# Patient Record
Sex: FEMALE | Race: WHITE | HISPANIC OR LATINO | Employment: STUDENT | URBAN - METROPOLITAN AREA
[De-identification: names, ages, dates, MRNs, and addresses within clinical notes are randomized per-mention and may not be internally consistent; named-entity substitution may affect disease eponyms.]

---

## 2017-01-03 ENCOUNTER — OFFICE VISIT (OUTPATIENT)
Dept: OBSTETRICS AND GYNECOLOGY | Facility: CLINIC | Age: 27
End: 2017-01-03
Payer: MEDICAID

## 2017-01-03 VITALS
BODY MASS INDEX: 20.98 KG/M2 | SYSTOLIC BLOOD PRESSURE: 118 MMHG | HEIGHT: 62 IN | DIASTOLIC BLOOD PRESSURE: 80 MMHG | WEIGHT: 114 LBS

## 2017-01-03 DIAGNOSIS — Z01.419 ENCOUNTER FOR ANNUAL ROUTINE GYNECOLOGICAL EXAMINATION: Primary | ICD-10-CM

## 2017-01-03 DIAGNOSIS — Z30.44 ENCOUNTER FOR SURVEILLANCE OF VAGINAL RING HORMONAL CONTRACEPTIVE DEVICE: ICD-10-CM

## 2017-01-03 PROCEDURE — 99202 OFFICE O/P NEW SF 15 MIN: CPT | Mod: PBBFAC | Performed by: OBSTETRICS & GYNECOLOGY

## 2017-01-03 PROCEDURE — 99999 PR PBB SHADOW E&M-NEW PATIENT-LVL II: CPT | Mod: PBBFAC,,, | Performed by: OBSTETRICS & GYNECOLOGY

## 2017-01-03 PROCEDURE — 99385 PREV VISIT NEW AGE 18-39: CPT | Mod: S$PBB,,, | Performed by: OBSTETRICS & GYNECOLOGY

## 2017-01-03 RX ORDER — LISINOPRIL AND HYDROCHLOROTHIAZIDE 20; 25 MG/1; MG/1
1 TABLET ORAL DAILY
COMMUNITY
End: 2021-12-02

## 2017-01-03 RX ORDER — ETONOGESTREL AND ETHINYL ESTRADIOL VAGINAL RING .015; .12 MG/D; MG/D
1 RING VAGINAL
Qty: 1 EACH | Refills: 11 | Status: SHIPPED | OUTPATIENT
Start: 2017-01-03 | End: 2018-01-25 | Stop reason: SDUPTHER

## 2017-01-03 NOTE — MR AVS SNAPSHOT
"    [facility] - OB/ GYN  [address], [address]  [place] [address]  Phone: [phone]                  Cherrie Mayeritalia   1/3/2017 3:40 PM   Office Visit    Description:  Female : 1990   Provider:  Sabrina Engle MD   Department:  [facility] - OB/ GYN           Reason for Visit     Annual Exam     Urinary Frequency                To Do List           Goals (5 Years of Data)     None      [facility] On Call     [facility] On Call Nurse Care Line -  Assistance  Registered nurses in the [facility] On Call Center provide clinical advisement, health education, appointment booking, and other advisory services.  Call for this free service at [phone].             Medications           Message regarding Medications     Verify the changes and/or additions to your medication regime listed below are the same as discussed with your clinician today.  If any of these changes or additions are incorrect, please notify your healthcare provider.             Verify that the below list of medications is an accurate representation of the medications you are currently taking.  If none reported, the list may be blank. If incorrect, please contact your healthcare provider. Carry this list with you in case of emergency.           Current Medications     buprenorphine-naloxone 2-0.5 mg (SUBOXONE) 2-0.5 mg Subl Place 2 mg under the tongue 2 (two) times daily.    ETONOGESTREL/ETHINYL ESTRADIOL (NUVARING VAGL) use as directed    ALPRAZOLAM (XANAX XR ORAL) Take by mouth.    atomoxetine (STRATTERA) 60 MG capsule Take 60 mg by mouth once daily.    docusate sodium (COLACE) 100 MG capsule Take 1 capsule (100 mg total) by mouth 2 (two) times daily.    lisinopril-hydrochlorothiazide (PRINZIDE,ZESTORETIC) 20-25 mg Tab Take 1 tablet by mouth once daily.           Clinical Reference Information           Vital Signs - Last Recorded  Most recent update: 1/3/2017  4:08 PM by Andra Ivey MA    BP Ht Wt LMP BMI    118/80 5' 2" (1.575 m) " 51.7 kg (114 lb) 12/14/2016 (Approximate) 20.85 kg/m2      Blood Pressure          Most Recent Value    BP  118/80      Allergies as of 1/3/2017     No Known Allergies      Immunizations Administered on Date of Encounter - 1/3/2017     None      Smoking Cessation     If you would like to quit smoking:   You may be eligible for free services if you are a [place] resident and started smoking cigarettes before September 1, 1988.  Call the [employer]ess[employer] ([employer]) toll free at ([phone] or [phone].   Call 6-762-QUIT-NOW if you do not meet the above criteria.

## 2017-01-03 NOTE — PROGRESS NOTES
Subjective:       Patient ID: Cherrie Wells is a 26 y.o. female.    Chief Complaint:  Annual Exam and Urinary Frequency      History of Present Illness  HPI  Cherrie Wells is a 26 y.o. female here for annual exam.    She describes her periods as regular, normal flow, lasting 3 days.   denies break through bleeding.   denies vaginal itching or irritation.  denies vaginal discharge.  She is sexually active. She has 1 partner.  She uses NuvaRing vaginal inserts for contraception.   History of abnormal pap: No  Last Pap: approximate date ~1-1.5 years ago and was normal  Last MMG: not indicated   Last Colonoscopy:  not indicated   Pt had gardisil shot.   Pt also has recurrent UTI. Taking macrobid after intercourse and is helping significantly.     GYN & OB History  Patient's last menstrual period was 12/14/2016 (approximate).   Date of Last Pap: No result found    OB History   No data available       Review of Systems  Review of Systems   Constitutional: Negative for chills, fatigue and fever.   Respiratory: Negative for shortness of breath.    Cardiovascular: Negative for chest pain.   Gastrointestinal: Negative for abdominal pain, constipation, diarrhea, nausea and vomiting.   Genitourinary: Negative for dyspareunia, dysuria, frequency, menorrhagia, menstrual problem, pelvic pain, vaginal discharge, vaginal pain, postcoital bleeding and vaginal odor.   Breast: Negative for breast mass, breast pain, nipple discharge and skin changes          Objective:    Physical Exam:   Constitutional: She is oriented to person, place, and time. She appears well-developed and well-nourished. No distress.    HENT:   Head: Normocephalic and atraumatic.     Neck: Normal range of motion. No thyromegaly present.    Cardiovascular: Normal rate and normal heart sounds.  Exam reveals no gallop and no friction rub.    No murmur heard.   Pulmonary/Chest: Effort normal and breath sounds normal. No respiratory distress. Right breast  exhibits no inverted nipple, no mass, no nipple discharge, no skin change, no tenderness, presence, no bleeding and no swelling. Left breast exhibits no inverted nipple, no mass, no nipple discharge, no skin change, no tenderness, presence, no bleeding and no swelling. Breasts are symmetrical.        Abdominal: Soft. Normal appearance and bowel sounds are normal. She exhibits no distension. There is no hepatosplenomegaly. There is no tenderness. There is no rigidity, no rebound and no guarding.     Genitourinary: There is no rash, tenderness, lesion or injury on the right labia. There is no rash, tenderness, lesion or injury on the left labia. Uterus is not deviated, not enlarged, not fixed, not tender, not hosting fibroids and not experiencing uterine prolapse. Cervix is normal. No absent adnexa (present). Right adnexum displays no mass, no tenderness and no fullness. Left adnexum displays no mass, no tenderness and no fullness. No erythema, tenderness or bleeding in the vagina. No signs of injury around the vagina. No vaginal discharge found. Cervix exhibits no motion tenderness, no discharge and no friability.   Genitourinary Comments: Urethral meatus normal        Uterus Size: 8 cm      Lymphadenopathy:     She has no cervical adenopathy.     She has no axillary adenopathy.    Neurological: She is alert and oriented to person, place, and time.     Psychiatric: She has a normal mood and affect.          Assessment:        1. Encounter for annual routine gynecological examination    2. Encounter for surveillance of vaginal ring hormonal contraceptive device              Plan:      1. Encounter for annual routine gynecological examination  - Pap up to date. Discussed ASCCP guidelines for screening every 3 years unless abnormal screening.   - MMG not indicated   - Colonoscopy not indicated      2. Encounter for surveillance of vaginal ring hormonal contraceptive device  - Patient was counseled today on contraceptive  options--Pills, Depo-Provera, IUD, Nexplanon, & Nuva Ring. We discussed the advantages and disadvantages of each. We discussed the continued use of condoms to prevent STDs. The patient elected to use NuvaRing. The session lasted approximately 25 minutes, greater than 50% was counseling. All her questions were answered.   - etonogestrel-ethinyl estradiol (NUVARING) 0.12-0.015 mg/24 hr vaginal ring; Place 1 each vaginally every 28 days.  Dispense: 1 each; Refill: 11       Return in about 1 year (around 1/3/2018).

## 2017-12-06 ENCOUNTER — HOSPITAL ENCOUNTER (EMERGENCY)
Facility: OTHER | Age: 27
Discharge: HOME | End: 2017-12-06
Attending: EMERGENCY MEDICINE
Payer: MEDICAID

## 2017-12-06 VITALS
DIASTOLIC BLOOD PRESSURE: 86 MMHG | TEMPERATURE: 99 F | WEIGHT: 115 LBS | RESPIRATION RATE: 18 BRPM | SYSTOLIC BLOOD PRESSURE: 121 MMHG | BODY MASS INDEX: 21.16 KG/M2 | HEIGHT: 62 IN | OXYGEN SATURATION: 100 % | HEART RATE: 80 BPM

## 2017-12-06 DIAGNOSIS — R00.0 TACHYCARDIA: ICD-10-CM

## 2017-12-06 DIAGNOSIS — R55 NEAR SYNCOPE: Primary | ICD-10-CM

## 2017-12-06 LAB
ALBUMIN SERPL BCP-MCNC: 3.5 G/DL
ALP SERPL-CCNC: 57 U/L
ALT SERPL W/O P-5'-P-CCNC: 11 U/L
AMPHET+METHAMPHET UR QL: NORMAL
ANION GAP SERPL CALC-SCNC: 10 MMOL/L
AST SERPL-CCNC: 13 U/L
BACTERIA #/AREA URNS HPF: ABNORMAL /HPF
BARBITURATES UR QL SCN>200 NG/ML: NEGATIVE
BASOPHILS # BLD AUTO: 0.01 K/UL
BASOPHILS NFR BLD: 0.2 %
BENZODIAZ UR QL SCN>200 NG/ML: NEGATIVE
BILIRUB SERPL-MCNC: 0.5 MG/DL
BILIRUB UR QL STRIP: NEGATIVE
BUN SERPL-MCNC: 8 MG/DL
BZE UR QL SCN: NEGATIVE
CALCIUM SERPL-MCNC: 8.8 MG/DL
CANNABINOIDS UR QL SCN: NEGATIVE
CHLORIDE SERPL-SCNC: 104 MMOL/L
CLARITY UR: CLEAR
CO2 SERPL-SCNC: 23 MMOL/L
COLOR UR: YELLOW
CREAT SERPL-MCNC: 0.8 MG/DL
CREAT UR-MCNC: 286.9 MG/DL
DIFFERENTIAL METHOD: ABNORMAL
EOSINOPHIL # BLD AUTO: 0 K/UL
EOSINOPHIL NFR BLD: 0.8 %
ERYTHROCYTE [DISTWIDTH] IN BLOOD BY AUTOMATED COUNT: 12.9 %
EST. GFR  (AFRICAN AMERICAN): >60 ML/MIN/1.73 M^2
EST. GFR  (NON AFRICAN AMERICAN): >60 ML/MIN/1.73 M^2
GLUCOSE SERPL-MCNC: 158 MG/DL
GLUCOSE UR QL STRIP: NEGATIVE
HCT VFR BLD AUTO: 36.1 %
HGB BLD-MCNC: 12.2 G/DL
HGB UR QL STRIP: ABNORMAL
KETONES UR QL STRIP: NEGATIVE
LEUKOCYTE ESTERASE UR QL STRIP: NEGATIVE
LYMPHOCYTES # BLD AUTO: 1.8 K/UL
LYMPHOCYTES NFR BLD: 34.4 %
MAGNESIUM SERPL-MCNC: 1.9 MG/DL
MCH RBC QN AUTO: 29 PG
MCHC RBC AUTO-ENTMCNC: 33.8 G/DL
MCV RBC AUTO: 86 FL
METHADONE UR QL SCN>300 NG/ML: NEGATIVE
MICROSCOPIC COMMENT: ABNORMAL
MONOCYTES # BLD AUTO: 0.2 K/UL
MONOCYTES NFR BLD: 3.1 %
NEUTROPHILS # BLD AUTO: 3.1 K/UL
NEUTROPHILS NFR BLD: 61.3 %
NITRITE UR QL STRIP: NEGATIVE
OPIATES UR QL SCN: NEGATIVE
PCP UR QL SCN>25 NG/ML: NEGATIVE
PH UR STRIP: 6 [PH] (ref 5–8)
PLATELET # BLD AUTO: 305 K/UL
PMV BLD AUTO: 9.1 FL
POTASSIUM SERPL-SCNC: 3.4 MMOL/L
PROT SERPL-MCNC: 6.8 G/DL
PROT UR QL STRIP: NEGATIVE
RBC # BLD AUTO: 4.21 M/UL
RBC #/AREA URNS HPF: 5 /HPF (ref 0–4)
SODIUM SERPL-SCNC: 137 MMOL/L
SP GR UR STRIP: >=1.03 (ref 1–1.03)
SQUAMOUS #/AREA URNS HPF: 9 /HPF
TOXICOLOGY INFORMATION: NORMAL
TSH SERPL DL<=0.005 MIU/L-ACNC: 1.01 UIU/ML
URN SPEC COLLECT METH UR: ABNORMAL
UROBILINOGEN UR STRIP-ACNC: NEGATIVE EU/DL
WBC # BLD AUTO: 5.09 K/UL
WBC #/AREA URNS HPF: 3 /HPF (ref 0–5)

## 2017-12-06 PROCEDURE — 81000 URINALYSIS NONAUTO W/SCOPE: CPT

## 2017-12-06 PROCEDURE — 25000003 PHARM REV CODE 250: Performed by: EMERGENCY MEDICINE

## 2017-12-06 PROCEDURE — 93010 ELECTROCARDIOGRAM REPORT: CPT | Mod: ,,, | Performed by: INTERNAL MEDICINE

## 2017-12-06 PROCEDURE — 99284 EMERGENCY DEPT VISIT MOD MDM: CPT | Mod: 25

## 2017-12-06 PROCEDURE — 80053 COMPREHEN METABOLIC PANEL: CPT

## 2017-12-06 PROCEDURE — 96360 HYDRATION IV INFUSION INIT: CPT

## 2017-12-06 PROCEDURE — 85025 COMPLETE CBC W/AUTO DIFF WBC: CPT

## 2017-12-06 PROCEDURE — 93005 ELECTROCARDIOGRAM TRACING: CPT

## 2017-12-06 PROCEDURE — 83735 ASSAY OF MAGNESIUM: CPT

## 2017-12-06 PROCEDURE — 80307 DRUG TEST PRSMV CHEM ANLYZR: CPT

## 2017-12-06 PROCEDURE — 84443 ASSAY THYROID STIM HORMONE: CPT

## 2017-12-06 RX ORDER — ACETAMINOPHEN 500 MG
1000 TABLET ORAL
Status: COMPLETED | OUTPATIENT
Start: 2017-12-06 | End: 2017-12-06

## 2017-12-06 RX ADMIN — ACETAMINOPHEN 1000 MG: 500 TABLET ORAL at 05:12

## 2017-12-06 RX ADMIN — SODIUM CHLORIDE 1000 ML: 0.9 INJECTION, SOLUTION INTRAVENOUS at 04:12

## 2017-12-06 NOTE — ED PROVIDER NOTES
"Encounter Date: 12/6/2017    SCRIBE #1 NOTE: I, Jenny Andrade, am scribing for, and in the presence of, Dr. Zhang.       History     Chief Complaint   Patient presents with    Fall     near syncope w/ fall today x 1 hour ago. reports darkening of vision that has resolved but currently has "spots in vision" and trouble focusing while reading     Time seen by provider: 3:44 PM    This is a 27 y.o. female with history of HTN, anxiety, and substance abuse who presents to the ED s/p fall that occurred approximately 1 hour ago.  Soon after she awoke this afternoon, the patient states that she began experiencing diaphoresis, abdominal cramping, near-syncope, and dizziness.  She became concerned for her BP, and while walking to the kitchen to get her HTN medication, she states that her vision began to fade to black.  She claims that this episode lasted for approximately 15 seconds and caused her to fall to the ground with no head trauma.  As she stood up, the patient states that her vision began to clear, but it still remained "fuzzy" peripherally.  On arrival to the ED, she states that her vision has improved somewhat, but she does not yet feel at baseline.  She also endorses palpitations, but she denies appetite changes, fever, chills, nasal congestion, rhinorrhea, sore throat, cough, dysuria, and changes in urinary frequency or urgency.  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  She also admits that she took an unspecified dosage of Adderall, which she is not normally prescribed, at approximately 6:00 PM last night to aid in her studying.  However, she claims that she has taken Adderall under these circumstances before and has had no prior reactions like this.  The patient mentions no other identifying, alleviating, or exacerbating factors.  She admits that she is non-compliant with her HTN medication regimen " and takes it p.r.n. rather than q.d.        The history is provided by the patient.     Review of patient's allergies indicates:  No Known Allergies  Past Medical History:   Diagnosis Date    Hypertension     Substance abuse      History reviewed. No pertinent surgical history.  Family History   Problem Relation Age of Onset    Breast cancer Paternal Grandmother     Diabetes Father     Hypertension Father      Social History   Substance Use Topics    Smoking status: Current Every Day Smoker     Packs/day: 1.00     Types: Cigarettes    Smokeless tobacco: Not on file    Alcohol use Yes      Comment: social     Review of Systems   Constitutional: Positive for diaphoresis. Negative for chills and fever.   HENT: Negative for congestion, facial swelling, rhinorrhea and sore throat.    Eyes: Positive for visual disturbance.   Respiratory: Negative for shortness of breath.    Cardiovascular: Positive for palpitations. Negative for chest pain.   Gastrointestinal: Positive for abdominal pain (cramping). Negative for nausea and vomiting.   Endocrine: Negative for polyuria.   Genitourinary: Negative for difficulty urinating, dysuria, frequency and urgency.   Musculoskeletal: Negative for myalgias.   Skin: Negative for rash.   Neurological: Positive for dizziness. Negative for headaches.        Positive for near-syncope.         Physical Exam     Initial Vitals [12/06/17 1518]   BP Pulse Resp Temp SpO2   (!) 174/98 (!) 145 16 98.5 °F (36.9 °C) 100 %      MAP       123.33         Physical Exam    Nursing note and vitals reviewed.  Constitutional: She appears well-developed and well-nourished. She is not diaphoretic. No distress.   HENT:   Head: Normocephalic and atraumatic.   Right Ear: External ear normal.   Left Ear: External ear normal.   Mouth/Throat: Mucous membranes are dry.   Eyes: EOM are normal. Right eye exhibits no discharge. Left eye exhibits no discharge.   Neck: Normal range of motion.   Cardiovascular:  Regular rhythm and normal heart sounds. Tachycardia present.  Exam reveals no gallop and no friction rub.    No murmur heard.  Pulmonary/Chest: Breath sounds normal. No respiratory distress. She has no wheezes. She has no rhonchi. She has no rales.   Abdominal: Soft. There is no tenderness. There is no rebound and no guarding.   Musculoskeletal: Normal range of motion. She exhibits no edema or tenderness.   Neurological: She is alert and oriented to person, place, and time.   Skin: Skin is warm and dry. No rash and no abscess noted. No erythema. No pallor.   Psychiatric: She has a normal mood and affect. Her behavior is normal. Judgment and thought content normal.         ED Course   Procedures  Labs Reviewed   URINALYSIS - Abnormal; Notable for the following:        Result Value    Specific Gravity, UA >=1.030 (*)     Occult Blood UA 1+ (*)     All other components within normal limits   URINALYSIS MICROSCOPIC - Abnormal; Notable for the following:     RBC, UA 5 (*)     Bacteria, UA Moderate (*)     All other components within normal limits   CBC W/ AUTO DIFFERENTIAL - Abnormal; Notable for the following:     Hematocrit 36.1 (*)     MPV 9.1 (*)     Mono # 0.2 (*)     Mono% 3.1 (*)     All other components within normal limits   COMPREHENSIVE METABOLIC PANEL - Abnormal; Notable for the following:     Potassium 3.4 (*)     Glucose 158 (*)     All other components within normal limits   DRUG SCREEN PANEL, URINE EMERGENCY   TSH   MAGNESIUM   POCT URINE PREGNANCY     Imaging Results    None         EKG Readings: (Independently Interpreted)   Initial Reading: No STEMI.   Sinus tachycardia. Rate of 135. No acute ischemia or arrhythmia.            Medical Decision Making:   History:   Old Medical Records: I decided to obtain old medical records.  Clinical Tests:   Lab Tests: Ordered and Reviewed  Medical Tests: Ordered and Reviewed  ED Management:      27 y.o. female presents after near syncopal episode.  Episode occured 1  "hour PTA with 15 seconds of vision "going dark" and no head trauma or LOC. No current vision complaints or unilateral sx. She is significantly tachycardic on arrival, but admits to taking Adderall last night to help study for stressful exam today.  Also likely dehydrated and component of anxiety as well.  No sign of arrhythmia on EKG and no sign of infectious cause.  Labs with no acute findings, including normal TSH.  After fluids and reassurance, repeat heart rate in the 80's and patient asymptomatic.  Orthostatics now negative, and she feels comfortable with discharge.  No concerning cardiac or neuro-etiology fo near-syncope.  Likely vasovagal cause related to dehydration and stress/ Adderall.                  Scribe Attestation:   Scribe #1: I performed the above scribed service and the documentation accurately describes the services I performed. I attest to the accuracy of the note.    Attending Attestation:           Physician Attestation for Scribe:  Physician Attestation Statement for Scribe #1: I, Dr. Zhang, reviewed documentation, as scribed by Jenny Andrade in my presence, and it is both accurate and complete.                 ED Course      Clinical Impression:     1. Near syncope    2. Tachycardia                                 Kameron Zhang MD  12/06/17 1479    "

## 2017-12-06 NOTE — ED TRIAGE NOTES
"Pt reports to ED c/o syncopal episode with diaphoresis, "darkened" vision this morning that lasted a few minutes. Pt reports currently exam week and admits taking adderal last night approx 1800. PMH of HTN. Pt reports vision remains "fuzzy" throughout day. Friend at bedside was with pt after episode and denies aphasia, slurred speech, extremity weakness, chest pain, SOB.   "

## 2017-12-07 NOTE — ED NOTES
"Rounding completed on pt. Pt reporting "feeling much better", denies any heart palpitations or "racing heart". Bed in lowest, locked position, side rails up x 2.   "

## 2018-01-25 DIAGNOSIS — Z30.44 ENCOUNTER FOR SURVEILLANCE OF VAGINAL RING HORMONAL CONTRACEPTIVE DEVICE: ICD-10-CM

## 2018-01-26 RX ORDER — ETONOGESTREL AND ETHINYL ESTRADIOL .12; .015 MG/D; MG/D
INSERT, EXTENDED RELEASE VAGINAL
Qty: 1 EACH | Refills: 0 | Status: SHIPPED | OUTPATIENT
Start: 2018-01-26 | End: 2020-06-04

## 2018-04-04 DIAGNOSIS — Z30.44 ENCOUNTER FOR SURVEILLANCE OF VAGINAL RING HORMONAL CONTRACEPTIVE DEVICE: ICD-10-CM

## 2018-04-04 RX ORDER — ETONOGESTREL AND ETHINYL ESTRADIOL .12; .015 MG/D; MG/D
INSERT, EXTENDED RELEASE VAGINAL
Refills: 0 | OUTPATIENT
Start: 2018-04-04

## 2020-06-04 ENCOUNTER — OFFICE VISIT (OUTPATIENT)
Dept: OBSTETRICS AND GYNECOLOGY | Facility: CLINIC | Age: 30
End: 2020-06-04
Attending: STUDENT IN AN ORGANIZED HEALTH CARE EDUCATION/TRAINING PROGRAM
Payer: COMMERCIAL

## 2020-06-04 VITALS
SYSTOLIC BLOOD PRESSURE: 148 MMHG | HEIGHT: 62 IN | DIASTOLIC BLOOD PRESSURE: 106 MMHG | WEIGHT: 126.63 LBS | BODY MASS INDEX: 23.3 KG/M2

## 2020-06-04 DIAGNOSIS — Z12.4 SCREENING FOR CERVICAL CANCER: ICD-10-CM

## 2020-06-04 DIAGNOSIS — Z30.011 ENCOUNTER FOR BCP INITIAL PRESCRIPTION: ICD-10-CM

## 2020-06-04 DIAGNOSIS — I10 HYPERTENSION, UNSPECIFIED TYPE: ICD-10-CM

## 2020-06-04 DIAGNOSIS — Z01.419 WELL WOMAN EXAM: ICD-10-CM

## 2020-06-04 DIAGNOSIS — Z11.51 SCREENING FOR HPV (HUMAN PAPILLOMAVIRUS): Primary | ICD-10-CM

## 2020-06-04 DIAGNOSIS — F11.11 HISTORY OF OPIOID ABUSE: ICD-10-CM

## 2020-06-04 PROCEDURE — 3080F DIAST BP >= 90 MM HG: CPT | Mod: CPTII,S$GLB,, | Performed by: STUDENT IN AN ORGANIZED HEALTH CARE EDUCATION/TRAINING PROGRAM

## 2020-06-04 PROCEDURE — 3080F PR MOST RECENT DIASTOLIC BLOOD PRESSURE >= 90 MM HG: ICD-10-PCS | Mod: CPTII,S$GLB,, | Performed by: STUDENT IN AN ORGANIZED HEALTH CARE EDUCATION/TRAINING PROGRAM

## 2020-06-04 PROCEDURE — 3077F SYST BP >= 140 MM HG: CPT | Mod: CPTII,S$GLB,, | Performed by: STUDENT IN AN ORGANIZED HEALTH CARE EDUCATION/TRAINING PROGRAM

## 2020-06-04 PROCEDURE — 99999 PR PBB SHADOW E&M-EST. PATIENT-LVL III: CPT | Mod: PBBFAC,,, | Performed by: STUDENT IN AN ORGANIZED HEALTH CARE EDUCATION/TRAINING PROGRAM

## 2020-06-04 PROCEDURE — 87624 HPV HI-RISK TYP POOLED RSLT: CPT

## 2020-06-04 PROCEDURE — 88175 CYTOPATH C/V AUTO FLUID REDO: CPT

## 2020-06-04 PROCEDURE — 99385 PREV VISIT NEW AGE 18-39: CPT | Mod: S$GLB,,, | Performed by: STUDENT IN AN ORGANIZED HEALTH CARE EDUCATION/TRAINING PROGRAM

## 2020-06-04 PROCEDURE — 99999 PR PBB SHADOW E&M-EST. PATIENT-LVL III: ICD-10-PCS | Mod: PBBFAC,,, | Performed by: STUDENT IN AN ORGANIZED HEALTH CARE EDUCATION/TRAINING PROGRAM

## 2020-06-04 PROCEDURE — 99385 PR PREVENTIVE VISIT,NEW,18-39: ICD-10-PCS | Mod: S$GLB,,, | Performed by: STUDENT IN AN ORGANIZED HEALTH CARE EDUCATION/TRAINING PROGRAM

## 2020-06-04 PROCEDURE — 3077F PR MOST RECENT SYSTOLIC BLOOD PRESSURE >= 140 MM HG: ICD-10-PCS | Mod: CPTII,S$GLB,, | Performed by: STUDENT IN AN ORGANIZED HEALTH CARE EDUCATION/TRAINING PROGRAM

## 2020-06-04 RX ORDER — HYDROXYZINE HYDROCHLORIDE 25 MG/1
TABLET, FILM COATED ORAL
COMMUNITY
Start: 2020-05-19 | End: 2021-12-02

## 2020-06-04 RX ORDER — ASPIRIN 325 MG
TABLET, DELAYED RELEASE (ENTERIC COATED) ORAL
COMMUNITY
Start: 2020-05-19 | End: 2021-12-02

## 2020-06-04 NOTE — PROGRESS NOTES
Chief Complaint: Well Woman Exam     HPI:      Cherrie Wells is a 30 y.o. G0who presents today for well woman exam.  LMP: Patient's last menstrual period was 05/12/2020 (exact date).  No issues, problems, or complaints. Specifically, patient denies abnormal vaginal bleeding, discharge, pelvic pain, urinary problems, or changes in appetite. Ms. Wells is currently sexually active with a single male partner. She is currently using condoms for contraception. She declines STD screening today.    Previous Pap: No result found    GYN Hx:  Menarche 11.  Reports cycles occur every 28 days with menses lasting 5-7 days.  Denies history of abnormal pap smears or STIs.  Gardasil:  No.    Has a history of oxycodone abuse several years ago - has been on suboxone since that time.  Doing well with it.      Also with HTN on lisinopril hctz.  Has a PCP.    Is interested in pregnancy in the next year or 2.     OB History    None      Obstetric Comments   Menarche age [age]           Past Medical History:   Diagnosis Date    Hypertension     Substance abuse      Past Surgical History:   Procedure Laterality Date    ANKLE SURGERY Left 2003    TONSILLECTOMY       Social History     Socioeconomic History    Marital status: Single     Spouse name: Not on file    Number of children: Not on file    Years of education: Not on file    Highest education level: Not on file   Occupational History    Not on file   Social Needs    Financial resource strain: Not on file    Food insecurity:     Worry: Not on file     Inability: Not on file    Transportation needs:     Medical: Not on file     Non-medical: Not on file   Tobacco Use    Smoking status: Current Every Day Smoker     Types: Vaping with nicotine, Vaping w/o nicotine    Smokeless tobacco: Never Used   Substance and Sexual Activity    Alcohol use: Yes     Comment: social    Drug use: No    Sexual activity: Yes     Partners: Male   Lifestyle    Physical activity:     Days  "per week: Not on file     Minutes per session: Not on file    Stress: Not on file   Relationships    Social connections:     Talks on phone: Not on file     Gets together: Not on file     Attends Nondenominational service: Not on file     Active member of club or organization: Not on file     Attends meetings of clubs or organizations: Not on file     Relationship status: Not on file   Other Topics Concern    Not on file   Social History Narrative    Not on file     Family History   Problem Relation Age of Onset    Breast cancer Paternal Grandmother     Diabetes Father     Hypertension Father     Colon cancer Neg Hx     Ovarian cancer Neg Hx        Current Outpatient Medications:     buprenorphine-naloxone 2-0.5 mg (SUBOXONE) 2-0.5 mg Subl, Place 2 mg under the tongue 2 (two) times daily., Disp: , Rfl:     cholecalciferol, vitamin D3, 1,250 mcg (50,000 unit) capsule, TK ONE C PO  WEEKLY, Disp: , Rfl:     hydrOXYzine HCL (ATARAX) 25 MG tablet, , Disp: , Rfl:     lisinopril-hydrochlorothiazide (PRINZIDE,ZESTORETIC) 20-25 mg Tab, Take 1 tablet by mouth once daily., Disp: , Rfl:     ROS:     GENERAL: Denies unintentional weight gain or weight loss. Feeling well overall.   SKIN: Denies rash or lesions.   HEENT: Denies headaches, or vision changes.   CARDIOVASCULAR: Denies palpitations or chest pain.   RESPIRATORY: Denies shortness of breath or dyspnea on exertion.  BREASTS: Denies pain, lumps, or nipple discharge.   ABDOMEN: Denies abdominal pain, constipation, diarrhea, nausea, vomiting, change in appetite.  URINARY: Denies frequency, dysuria, hematuria.  NEUROLOGIC: Denies syncope or weakness.   PSYCHIATRIC: Denies depression, anxiety or mood swings.    Physical Exam:      PHYSICAL EXAM:  BP (!) 148/106   Ht 5' 2" (1.575 m)   Wt 57.5 kg (126 lb 10.5 oz)   LMP 05/12/2020 (Exact Date)   BMI 23.17 kg/m²   Body mass index is 23.17 kg/m².     APPEARANCE: Well nourished, well developed, in no acute distress.  PSYCH: " Appropriate mood and affect.  SKIN: No acne or hirsutism.  NECK: Neck symmetric without masses or thyromegaly.  NODES: No inguinal, axillary, or supraclavicular lymph node enlargement.  CHEST: Normal respiratory effort.    CARDIOVASCULAR:  Regular rate and rhythm.  BREASTS: Symmetrical, no skin changes or visible lesions.  No palpable masses or nipple discharge bilaterally.  ABDOMEN: Soft.  No tenderness or masses.    PELVIC: Normal external genitalia without lesions.  Normal hair distribution.  Adequate perineal body, normal urethral meatus.  Vagina moist and well rugated without lesions or discharge.  Cervix pink, without lesions, discharge or tenderness.  No significant cystocele or rectocele.  Bimanual exam shows uterus to be normal size, regular, mobile and nontender.  Adnexa without masses or tenderness.    EXTREMITIES: No edema.  No tenderness to palpation.    Labs:  upt neg    Assessment/Plan:     30 y.o. No obstetric history on file.    Screening for HPV (human papillomavirus)  -     HPV High Risk Genotypes, PCR    Screening for cervical cancer  -     Liquid-Based Pap Smear, Screening    Encounter for BCP initial prescription  -     POCT urine pregnancy    Well woman exam          Counselin.  Annual exam performed today without difficulty.  Patient was counseled today on current ASCCP pap guidelines, the recommendation for yearly pelvic exams, healthy diet and exercise routines, breast self awareness.  Pap smear collected.  All questions answered.    2.  Contraception:  Declines.  3.  Preconception:   Patient was counseled today on proper weight gain during pregnancy based on the [place] [employer] [facility]'s recommendations. Healthy diet emphasized.   Recommended prenatal vitamins with folic acid starting at least 3 months prior to conception.  Safety of exercise discussed with patient, and continued active lifestyle encouraged.   Zika virus precautions for both patient and her spouse reviewed, and  patient was advised to let us know if she has any flu like sx in the 6 months prior to conception.  Avoidance of tobacco and alcohol when trying to conceive were discussed.   Optimal timing of intercourse reviewed.  Discussed carrier screening and patient will call if interested.    Reviewed medical history and immunization history - has had chicken pox and vaccinations.    Discussed that she should come off of ACEI or ARB prior to conception - she will discuss with PCP.  Reviewed risks of medication in pregnancy.  Also discussed risks of uncontrolled HTN.  She will call me after talking with PCP.    Also discussed preconception counseling with MFM regarding suboxone use.  Will place order.    4.  Follow up with PCP for other health maintenance.  5.  RTC in 1 year or sooner if needed.    Use of the [facility] Patient Portal discussed and encouraged during today's visit.

## 2020-06-10 LAB
FINAL PATHOLOGIC DIAGNOSIS: NORMAL
Lab: NORMAL

## 2020-06-12 ENCOUNTER — PATIENT MESSAGE (OUTPATIENT)
Dept: OBSTETRICS AND GYNECOLOGY | Facility: CLINIC | Age: 30
End: 2020-06-12

## 2020-06-12 LAB
HPV HR 12 DNA SPEC QL NAA+PROBE: NEGATIVE
HPV16 AG SPEC QL: NEGATIVE
HPV18 DNA SPEC QL NAA+PROBE: NEGATIVE

## 2020-06-16 NOTE — TELEPHONE ENCOUNTER
Called patient to inform of normal pap and recommendations. No answer. Left a message informing patient of results.

## 2020-11-06 ENCOUNTER — OFFICE VISIT (OUTPATIENT)
Dept: URGENT CARE | Facility: CLINIC | Age: 30
End: 2020-11-06
Payer: COMMERCIAL

## 2020-11-06 VITALS
DIASTOLIC BLOOD PRESSURE: 118 MMHG | BODY MASS INDEX: 23.19 KG/M2 | HEIGHT: 62 IN | TEMPERATURE: 98 F | OXYGEN SATURATION: 98 % | SYSTOLIC BLOOD PRESSURE: 168 MMHG | RESPIRATION RATE: 16 BRPM | WEIGHT: 126 LBS | HEART RATE: 99 BPM

## 2020-11-06 DIAGNOSIS — Z20.822 EXPOSURE TO COVID-19 VIRUS: Primary | ICD-10-CM

## 2020-11-06 LAB
CTP QC/QA: YES
SARS-COV-2 RDRP RESP QL NAA+PROBE: NEGATIVE

## 2020-11-06 PROCEDURE — 99203 OFFICE O/P NEW LOW 30 MIN: CPT | Mod: S$GLB,,, | Performed by: FAMILY MEDICINE

## 2020-11-06 PROCEDURE — U0002: ICD-10-PCS | Mod: QW,S$GLB,, | Performed by: FAMILY MEDICINE

## 2020-11-06 PROCEDURE — 99203 PR OFFICE/OUTPT VISIT, NEW, LEVL III, 30-44 MIN: ICD-10-PCS | Mod: S$GLB,,, | Performed by: FAMILY MEDICINE

## 2020-11-06 PROCEDURE — U0002 COVID-19 LAB TEST NON-CDC: HCPCS | Mod: QW,S$GLB,, | Performed by: FAMILY MEDICINE

## 2020-11-06 NOTE — LETTER
[address] ? [place], [address] ? Phone [phone] ? Fax [phone]           Return to Work/School    Patient: Cherrie Wells  YOB: 1990   Date: 11/06/2020      To Whom It May Concern:     Cherrie Wells was in contact with/seen in my office on 11/06/2020. COVID-19 is present in our communities across the state. Not all patients are eligible or appropriate to be tested. In this situation, your employee meets the following criteria:     Cherrie Wells has met the criteria for COVID-19 testing and has a NEGATIVE result. The employee can return to work once they have quarantined two weeks from last contact with covid positive person and no new symptoms have started.     If you have any questions or concerns, or if I can be of further assistance, please do not hesitate to contact me.     Sincerely,      Jose Barrientos NP

## 2020-11-06 NOTE — PROGRESS NOTES
"Subjective:       Patient ID: Cherrie Wells is a 30 y.o. female.    Vitals:  height is 5' 2" (1.575 m) and weight is 57.2 kg (126 lb). Her tympanic temperature is 97.5 °F (36.4 °C). Her blood pressure is 168/118 (abnormal) and her pulse is 99. Her respiration is 16 and oxygen saturation is 98%.     Chief Complaint: COVID-19 Concerns and Sinus Problem    Patient reports exposure to covid closely less than 6 feet over 15 minutes. Patient reports runny nose that started today. No fever, anosmia, cp, sob, cough.    Sinus Problem  This is a new problem. The current episode started today. The problem is unchanged. There has been no fever. She is experiencing no pain. Associated symptoms include headaches (yesterday). Pertinent negatives include no chills, congestion, coughing, diaphoresis, ear pain, shortness of breath, sinus pressure or sore throat. Past treatments include nothing.       Constitution: Negative for chills, sweating, fatigue and fever.   HENT: Negative for ear pain, congestion, sinus pain, sinus pressure, sore throat and voice change.         Runny nose   Neck: Negative for painful lymph nodes.   Eyes: Negative for eye redness.   Respiratory: Negative for chest tightness, cough, sputum production, bloody sputum, COPD, shortness of breath, stridor, wheezing and asthma.    Gastrointestinal: Negative for nausea and vomiting.   Musculoskeletal: Negative for muscle ache.   Skin: Negative for rash.   Allergic/Immunologic: Negative for seasonal allergies and asthma.   Neurological: Positive for headaches (yesterday).   Hematologic/Lymphatic: Negative for swollen lymph nodes.       Objective:      Physical Exam   Constitutional: She is oriented to person, place, and time. She appears well-developed. She is cooperative.  Non-toxic appearance. She does not appear ill. No distress.   HENT:   Head: Normocephalic and atraumatic.   Ears:   Right Ear: Hearing, tympanic membrane, external ear and ear canal normal. "   Left Ear: Hearing, tympanic membrane, external ear and ear canal normal.   Nose: Right sinus exhibits no maxillary sinus tenderness and no frontal sinus tenderness. Left sinus exhibits no maxillary sinus tenderness and no frontal sinus tenderness.   Pts Mask was not removed to decrease any transmission of viruses, pt  does not have a muffled voice or difficulty talking/swallowing        Comments: Pts Mask was not removed to decrease any transmission of viruses, pt  does not have a muffled voice or difficulty talking/swallowing    Eyes: Conjunctivae and lids are normal. No scleral icterus.   Neck: Trachea normal, full passive range of motion without pain and phonation normal. Neck supple. No neck rigidity. No edema and no erythema present.   Cardiovascular: Normal rate, regular rhythm, normal heart sounds and normal pulses.   Pulmonary/Chest: Effort normal and breath sounds normal. No accessory muscle usage. No tachypnea. No respiratory distress. She has no decreased breath sounds. She has no wheezes. She has no rhonchi. She has no rales.   NAD able to speak in clear complete sentences without difficulty      Comments: NAD able to speak in clear complete sentences without difficulty      Abdominal: Normal appearance.   Musculoskeletal: Normal range of motion.         General: No deformity.   Neurological: She is alert and oriented to person, place, and time. She exhibits normal muscle tone. Coordination normal.   Skin: Skin is warm, dry, intact, not diaphoretic and not pale. Psychiatric: Her speech is normal and behavior is normal. Judgment and thought content normal.   Nursing note and vitals reviewed.          Results for orders placed or performed in visit on 11/06/20   POCT COVID-19 Rapid Screening   Result Value Ref Range    POC Rapid COVID Negative Negative     Acceptable Yes        Assessment:       1. Exposure to COVID-19 virus        Plan:         Exposure to COVID-19 virus  -     POCT  COVID-19 Rapid Screening    2 week quarantine  Discussed sx treatment and f/u precautions  Did not take bp meds today, monitor bp after meds    Patient Instructions   PLEASE READ YOUR DISCHARGE INSTRUCTIONS ENTIRELY AS IT CONTAINS IMPORTANT INFORMATION.    CDC Testing and Quarantine Guidelines for Exposure:    A close exposure is defined as anyone who had a masked or an unmasked exposure to a known COVID -19 positive person, at less than 6 ft for more than 15 minutes. If your exposure meets this definition, then you are required to quarantine for 14 days per the CDC. They now recommend that a test can be performed if you are asymptomatic (someone who does not have any symptoms), and a test should be done if you develop symptoms after an exposure as described above.       If you meet the definition of a close exposure, it does not matter whether or not you are asymptomatic or symptomatic - A NEGATIVE TEST DOES NOT GET YOU OUT OF 14 DAYS OF QUARANTINE!      Please note that if you are asymptomatic and wait more than 4 days to test after an exposure, you risk lengthening your quarantine. This is because if you test positive as an asymptomatic, your isolation is 10 days from the date of the positive test, not the date of exposure. So for example, if you test positive as an asymptomatic on day 7 from exposure, you have now extended your 14 day quarantine to a 17 day isolation.       If your exposure does not meet the above definition, you may return to your normal activities including social distancing, wearing masks, and frequent handwashing.        Please return or see your primary care doctor if you develop new or worsening symptoms.     You must understand that you have received an Urgent Care treatment only and that you may be released before all of your medical problems are known or treated.      Guidelines for General Prevention of COVID-19    o Take steps to protect yourself from COVID-19. Perform hand hygiene  frequently. Wash your hands often with soap and water for at least 20 seconds of use and alcohol-based hand , covering all surfaces of your hands and rubbing them together until they feel dry.  o Avoid touching your eyes, nose, and mouth with unwashed hands.  o Avoid close contact with people and stay home if youre sick, except to get medical care.   o Cover coughs and sneezes with a tissue, or use the inside of your elbow. Immediately wash your hands or use hand .     For more information, see CDC link below:    https://www.cdc.gov/coronavirus/2019-ncov/hcp/guidance-prevent-spread.html#precautions

## 2021-04-26 ENCOUNTER — PATIENT MESSAGE (OUTPATIENT)
Dept: RESEARCH | Facility: HOSPITAL | Age: 31
End: 2021-04-26

## 2021-12-02 ENCOUNTER — OFFICE VISIT (OUTPATIENT)
Dept: OBSTETRICS AND GYNECOLOGY | Facility: CLINIC | Age: 31
End: 2021-12-02

## 2021-12-02 ENCOUNTER — TELEPHONE (OUTPATIENT)
Dept: OBSTETRICS AND GYNECOLOGY | Facility: CLINIC | Age: 31
End: 2021-12-02

## 2021-12-02 ENCOUNTER — PROCEDURE VISIT (OUTPATIENT)
Dept: OBSTETRICS AND GYNECOLOGY | Facility: CLINIC | Age: 31
End: 2021-12-02
Attending: STUDENT IN AN ORGANIZED HEALTH CARE EDUCATION/TRAINING PROGRAM

## 2021-12-02 VITALS
DIASTOLIC BLOOD PRESSURE: 68 MMHG | WEIGHT: 127.13 LBS | SYSTOLIC BLOOD PRESSURE: 130 MMHG | HEIGHT: 62 IN | BODY MASS INDEX: 23.4 KG/M2

## 2021-12-02 DIAGNOSIS — Z34.90 PREGNANCY, UNSPECIFIED GESTATIONAL AGE: Primary | ICD-10-CM

## 2021-12-02 DIAGNOSIS — Z34.90 PREGNANCY, UNSPECIFIED GESTATIONAL AGE: ICD-10-CM

## 2021-12-02 DIAGNOSIS — Z86.79 HISTORY OF ESSENTIAL HYPERTENSION: ICD-10-CM

## 2021-12-02 DIAGNOSIS — Z32.01 POSITIVE PREGNANCY TEST: ICD-10-CM

## 2021-12-02 DIAGNOSIS — F11.11 HISTORY OF OPIOID ABUSE: ICD-10-CM

## 2021-12-02 LAB
B-HCG UR QL: POSITIVE
CTP QC/QA: YES

## 2021-12-02 PROCEDURE — 81025 POCT URINE PREGNANCY: ICD-10-PCS | Mod: ,,, | Performed by: NURSE PRACTITIONER

## 2021-12-02 PROCEDURE — 76801 US OB/GYN EXTENDED PROCEDURE (VIEWPOINT): ICD-10-PCS | Mod: 26,S$PBB,, | Performed by: OBSTETRICS & GYNECOLOGY

## 2021-12-02 PROCEDURE — 99213 PR OFFICE/OUTPT VISIT, EST, LEVL III, 20-29 MIN: ICD-10-PCS | Mod: ,,, | Performed by: NURSE PRACTITIONER

## 2021-12-02 PROCEDURE — 81025 URINE PREGNANCY TEST: CPT | Mod: ,,, | Performed by: NURSE PRACTITIONER

## 2021-12-02 PROCEDURE — 87491 CHLMYD TRACH DNA AMP PROBE: CPT | Performed by: NURSE PRACTITIONER

## 2021-12-02 PROCEDURE — 76801 OB US < 14 WKS SINGLE FETUS: CPT | Mod: PBBFAC,PN | Performed by: OBSTETRICS & GYNECOLOGY

## 2021-12-02 PROCEDURE — 76817 US OB/GYN EXTENDED PROCEDURE (VIEWPOINT): ICD-10-PCS | Mod: 26,S$PBB,, | Performed by: OBSTETRICS & GYNECOLOGY

## 2021-12-02 PROCEDURE — 99213 OFFICE O/P EST LOW 20 MIN: CPT | Mod: ,,, | Performed by: NURSE PRACTITIONER

## 2021-12-02 PROCEDURE — 87591 N.GONORRHOEAE DNA AMP PROB: CPT | Performed by: NURSE PRACTITIONER

## 2021-12-02 PROCEDURE — 87086 URINE CULTURE/COLONY COUNT: CPT | Performed by: NURSE PRACTITIONER

## 2021-12-02 PROCEDURE — 76817 TRANSVAGINAL US OBSTETRIC: CPT | Mod: 26,S$PBB,, | Performed by: OBSTETRICS & GYNECOLOGY

## 2021-12-02 RX ORDER — ESCITALOPRAM OXALATE 20 MG/1
20 TABLET ORAL DAILY
COMMUNITY

## 2021-12-03 ENCOUNTER — TELEPHONE (OUTPATIENT)
Dept: OBSTETRICS AND GYNECOLOGY | Facility: CLINIC | Age: 31
End: 2021-12-03

## 2021-12-04 LAB — BACTERIA UR CULT: NO GROWTH

## 2021-12-06 LAB
C TRACH DNA SPEC QL NAA+PROBE: NOT DETECTED
N GONORRHOEA DNA SPEC QL NAA+PROBE: NOT DETECTED

## 2021-12-15 ENCOUNTER — LAB VISIT (OUTPATIENT)
Dept: LAB | Facility: HOSPITAL | Age: 31
End: 2021-12-15
Attending: STUDENT IN AN ORGANIZED HEALTH CARE EDUCATION/TRAINING PROGRAM

## 2021-12-15 DIAGNOSIS — Z34.90 PREGNANCY, UNSPECIFIED GESTATIONAL AGE: ICD-10-CM

## 2021-12-15 LAB
ABO + RH BLD: NORMAL
BASOPHILS # BLD AUTO: 0.03 K/UL (ref 0–0.2)
BASOPHILS NFR BLD: 0.4 % (ref 0–1.9)
BLD GP AB SCN CELLS X3 SERPL QL: NORMAL
DIFFERENTIAL METHOD: ABNORMAL
EOSINOPHIL # BLD AUTO: 0.1 K/UL (ref 0–0.5)
EOSINOPHIL NFR BLD: 1.5 % (ref 0–8)
ERYTHROCYTE [DISTWIDTH] IN BLOOD BY AUTOMATED COUNT: 11.9 % (ref 11.5–14.5)
GLUCOSE SERPL-MCNC: 94 MG/DL (ref 70–110)
HCT VFR BLD AUTO: 36.7 % (ref 37–48.5)
HGB BLD-MCNC: 12.1 G/DL (ref 12–16)
IMM GRANULOCYTES # BLD AUTO: 0.02 K/UL (ref 0–0.04)
IMM GRANULOCYTES NFR BLD AUTO: 0.3 % (ref 0–0.5)
LYMPHOCYTES # BLD AUTO: 1.9 K/UL (ref 1–4.8)
LYMPHOCYTES NFR BLD: 27.8 % (ref 18–48)
MCH RBC QN AUTO: 29.9 PG (ref 27–31)
MCHC RBC AUTO-ENTMCNC: 33 G/DL (ref 32–36)
MCV RBC AUTO: 91 FL (ref 82–98)
MONOCYTES # BLD AUTO: 0.3 K/UL (ref 0.3–1)
MONOCYTES NFR BLD: 4.4 % (ref 4–15)
NEUTROPHILS # BLD AUTO: 4.5 K/UL (ref 1.8–7.7)
NEUTROPHILS NFR BLD: 65.6 % (ref 38–73)
NRBC BLD-RTO: 0 /100 WBC
PLATELET # BLD AUTO: 384 K/UL (ref 150–450)
PMV BLD AUTO: 10.5 FL (ref 9.2–12.9)
RBC # BLD AUTO: 4.05 M/UL (ref 4–5.4)
TSH SERPL DL<=0.005 MIU/L-ACNC: 0.59 UIU/ML (ref 0.4–4)
WBC # BLD AUTO: 6.87 K/UL (ref 3.9–12.7)

## 2021-12-15 PROCEDURE — 82947 ASSAY GLUCOSE BLOOD QUANT: CPT | Performed by: STUDENT IN AN ORGANIZED HEALTH CARE EDUCATION/TRAINING PROGRAM

## 2021-12-15 PROCEDURE — 87340 HEPATITIS B SURFACE AG IA: CPT | Performed by: STUDENT IN AN ORGANIZED HEALTH CARE EDUCATION/TRAINING PROGRAM

## 2021-12-15 PROCEDURE — 87389 HIV-1 AG W/HIV-1&-2 AB AG IA: CPT | Performed by: STUDENT IN AN ORGANIZED HEALTH CARE EDUCATION/TRAINING PROGRAM

## 2021-12-15 PROCEDURE — 86762 RUBELLA ANTIBODY: CPT | Performed by: STUDENT IN AN ORGANIZED HEALTH CARE EDUCATION/TRAINING PROGRAM

## 2021-12-15 PROCEDURE — 84443 ASSAY THYROID STIM HORMONE: CPT | Performed by: STUDENT IN AN ORGANIZED HEALTH CARE EDUCATION/TRAINING PROGRAM

## 2021-12-15 PROCEDURE — 85025 COMPLETE CBC W/AUTO DIFF WBC: CPT | Performed by: STUDENT IN AN ORGANIZED HEALTH CARE EDUCATION/TRAINING PROGRAM

## 2021-12-15 PROCEDURE — 86900 BLOOD TYPING SEROLOGIC ABO: CPT | Performed by: STUDENT IN AN ORGANIZED HEALTH CARE EDUCATION/TRAINING PROGRAM

## 2021-12-15 PROCEDURE — 86592 SYPHILIS TEST NON-TREP QUAL: CPT | Performed by: STUDENT IN AN ORGANIZED HEALTH CARE EDUCATION/TRAINING PROGRAM

## 2021-12-16 ENCOUNTER — TELEPHONE (OUTPATIENT)
Dept: OBSTETRICS AND GYNECOLOGY | Facility: CLINIC | Age: 31
End: 2021-12-16

## 2021-12-16 LAB
HBV SURFACE AG SERPL QL IA: NEGATIVE
HIV 1+2 AB+HIV1 P24 AG SERPL QL IA: NEGATIVE
RPR SER QL: NORMAL
RUBV IGG SER-ACNC: 45.6 IU/ML
RUBV IGG SER-IMP: REACTIVE

## 2021-12-21 ENCOUNTER — TELEPHONE (OUTPATIENT)
Dept: OBSTETRICS AND GYNECOLOGY | Facility: CLINIC | Age: 31
End: 2021-12-21

## 2022-01-04 ENCOUNTER — LAB VISIT (OUTPATIENT)
Dept: LAB | Facility: OTHER | Age: 32
End: 2022-01-04
Attending: STUDENT IN AN ORGANIZED HEALTH CARE EDUCATION/TRAINING PROGRAM
Payer: COMMERCIAL

## 2022-01-04 ENCOUNTER — INITIAL PRENATAL (OUTPATIENT)
Dept: OBSTETRICS AND GYNECOLOGY | Facility: CLINIC | Age: 32
End: 2022-01-04
Attending: STUDENT IN AN ORGANIZED HEALTH CARE EDUCATION/TRAINING PROGRAM
Payer: COMMERCIAL

## 2022-01-04 VITALS
WEIGHT: 144.94 LBS | BODY MASS INDEX: 26.51 KG/M2 | SYSTOLIC BLOOD PRESSURE: 148 MMHG | DIASTOLIC BLOOD PRESSURE: 108 MMHG

## 2022-01-04 DIAGNOSIS — R31.9 HEMATURIA, UNSPECIFIED TYPE: ICD-10-CM

## 2022-01-04 DIAGNOSIS — Z3A.13 13 WEEKS GESTATION OF PREGNANCY: ICD-10-CM

## 2022-01-04 DIAGNOSIS — Z3A.13 13 WEEKS GESTATION OF PREGNANCY: Primary | ICD-10-CM

## 2022-01-04 LAB
ABO + RH BLD: NORMAL
ALBUMIN SERPL BCP-MCNC: 3.6 G/DL (ref 3.5–5.2)
ALP SERPL-CCNC: 68 U/L (ref 55–135)
ALT SERPL W/O P-5'-P-CCNC: 11 U/L (ref 10–44)
ANION GAP SERPL CALC-SCNC: 12 MMOL/L (ref 8–16)
AST SERPL-CCNC: 11 U/L (ref 10–40)
BASOPHILS # BLD AUTO: 0.03 K/UL (ref 0–0.2)
BASOPHILS NFR BLD: 0.3 % (ref 0–1.9)
BILIRUB SERPL-MCNC: 0.3 MG/DL (ref 0.1–1)
BLD GP AB SCN CELLS X3 SERPL QL: NORMAL
BUN SERPL-MCNC: 8 MG/DL (ref 6–20)
CALCIUM SERPL-MCNC: 9 MG/DL (ref 8.7–10.5)
CHLORIDE SERPL-SCNC: 102 MMOL/L (ref 95–110)
CO2 SERPL-SCNC: 25 MMOL/L (ref 23–29)
CREAT SERPL-MCNC: 0.6 MG/DL (ref 0.5–1.4)
DIFFERENTIAL METHOD: ABNORMAL
EOSINOPHIL # BLD AUTO: 0.1 K/UL (ref 0–0.5)
EOSINOPHIL NFR BLD: 0.7 % (ref 0–8)
ERYTHROCYTE [DISTWIDTH] IN BLOOD BY AUTOMATED COUNT: 12.3 % (ref 11.5–14.5)
EST. GFR  (AFRICAN AMERICAN): >60 ML/MIN/1.73 M^2
EST. GFR  (NON AFRICAN AMERICAN): >60 ML/MIN/1.73 M^2
GLUCOSE SERPL-MCNC: 102 MG/DL (ref 70–110)
HCT VFR BLD AUTO: 35 % (ref 37–48.5)
HGB BLD-MCNC: 11.8 G/DL (ref 12–16)
IMM GRANULOCYTES # BLD AUTO: 0.04 K/UL (ref 0–0.04)
IMM GRANULOCYTES NFR BLD AUTO: 0.4 % (ref 0–0.5)
LYMPHOCYTES # BLD AUTO: 1.9 K/UL (ref 1–4.8)
LYMPHOCYTES NFR BLD: 20.9 % (ref 18–48)
MCH RBC QN AUTO: 30.3 PG (ref 27–31)
MCHC RBC AUTO-ENTMCNC: 33.7 G/DL (ref 32–36)
MCV RBC AUTO: 90 FL (ref 82–98)
MONOCYTES # BLD AUTO: 0.5 K/UL (ref 0.3–1)
MONOCYTES NFR BLD: 5.1 % (ref 4–15)
NEUTROPHILS # BLD AUTO: 6.6 K/UL (ref 1.8–7.7)
NEUTROPHILS NFR BLD: 72.6 % (ref 38–73)
NRBC BLD-RTO: 0 /100 WBC
PLATELET # BLD AUTO: 340 K/UL (ref 150–450)
PMV BLD AUTO: 9.5 FL (ref 9.2–12.9)
POTASSIUM SERPL-SCNC: 3.7 MMOL/L (ref 3.5–5.1)
PROT SERPL-MCNC: 6.5 G/DL (ref 6–8.4)
RBC # BLD AUTO: 3.89 M/UL (ref 4–5.4)
SODIUM SERPL-SCNC: 139 MMOL/L (ref 136–145)
WBC # BLD AUTO: 9.13 K/UL (ref 3.9–12.7)

## 2022-01-04 PROCEDURE — 80053 COMPREHEN METABOLIC PANEL: CPT | Performed by: STUDENT IN AN ORGANIZED HEALTH CARE EDUCATION/TRAINING PROGRAM

## 2022-01-04 PROCEDURE — 87389 HIV-1 AG W/HIV-1&-2 AB AG IA: CPT | Performed by: STUDENT IN AN ORGANIZED HEALTH CARE EDUCATION/TRAINING PROGRAM

## 2022-01-04 PROCEDURE — 0500F INITIAL PRENATAL CARE VISIT: CPT | Mod: CPTII,S$GLB,, | Performed by: STUDENT IN AN ORGANIZED HEALTH CARE EDUCATION/TRAINING PROGRAM

## 2022-01-04 PROCEDURE — 87086 URINE CULTURE/COLONY COUNT: CPT | Performed by: STUDENT IN AN ORGANIZED HEALTH CARE EDUCATION/TRAINING PROGRAM

## 2022-01-04 PROCEDURE — 87088 URINE BACTERIA CULTURE: CPT | Performed by: STUDENT IN AN ORGANIZED HEALTH CARE EDUCATION/TRAINING PROGRAM

## 2022-01-04 PROCEDURE — 87077 CULTURE AEROBIC IDENTIFY: CPT | Mod: 59 | Performed by: STUDENT IN AN ORGANIZED HEALTH CARE EDUCATION/TRAINING PROGRAM

## 2022-01-04 PROCEDURE — 99999 PR PBB SHADOW E&M-EST. PATIENT-LVL II: ICD-10-PCS | Mod: PBBFAC,,, | Performed by: STUDENT IN AN ORGANIZED HEALTH CARE EDUCATION/TRAINING PROGRAM

## 2022-01-04 PROCEDURE — 86900 BLOOD TYPING SEROLOGIC ABO: CPT | Performed by: STUDENT IN AN ORGANIZED HEALTH CARE EDUCATION/TRAINING PROGRAM

## 2022-01-04 PROCEDURE — 86850 RBC ANTIBODY SCREEN: CPT | Performed by: STUDENT IN AN ORGANIZED HEALTH CARE EDUCATION/TRAINING PROGRAM

## 2022-01-04 PROCEDURE — 87340 HEPATITIS B SURFACE AG IA: CPT | Performed by: STUDENT IN AN ORGANIZED HEALTH CARE EDUCATION/TRAINING PROGRAM

## 2022-01-04 PROCEDURE — 86592 SYPHILIS TEST NON-TREP QUAL: CPT | Performed by: STUDENT IN AN ORGANIZED HEALTH CARE EDUCATION/TRAINING PROGRAM

## 2022-01-04 PROCEDURE — 86762 RUBELLA ANTIBODY: CPT | Performed by: STUDENT IN AN ORGANIZED HEALTH CARE EDUCATION/TRAINING PROGRAM

## 2022-01-04 PROCEDURE — 87186 SC STD MICRODIL/AGAR DIL: CPT | Performed by: STUDENT IN AN ORGANIZED HEALTH CARE EDUCATION/TRAINING PROGRAM

## 2022-01-04 PROCEDURE — 85025 COMPLETE CBC W/AUTO DIFF WBC: CPT | Performed by: STUDENT IN AN ORGANIZED HEALTH CARE EDUCATION/TRAINING PROGRAM

## 2022-01-04 PROCEDURE — 99999 PR PBB SHADOW E&M-EST. PATIENT-LVL II: CPT | Mod: PBBFAC,,, | Performed by: STUDENT IN AN ORGANIZED HEALTH CARE EDUCATION/TRAINING PROGRAM

## 2022-01-04 PROCEDURE — 0500F PR INITIAL PRENATAL CARE VISIT: ICD-10-PCS | Mod: CPTII,S$GLB,, | Performed by: STUDENT IN AN ORGANIZED HEALTH CARE EDUCATION/TRAINING PROGRAM

## 2022-01-05 LAB
HBV SURFACE AG SERPL QL IA: NEGATIVE
HIV 1+2 AB+HIV1 P24 AG SERPL QL IA: NEGATIVE
RPR SER QL: NORMAL
RUBV IGG SER-ACNC: 44.7 IU/ML
RUBV IGG SER-IMP: REACTIVE

## 2022-01-08 ENCOUNTER — PATIENT MESSAGE (OUTPATIENT)
Dept: OBSTETRICS AND GYNECOLOGY | Facility: CLINIC | Age: 32
End: 2022-01-08
Payer: COMMERCIAL

## 2022-01-08 LAB
BACTERIA UR CULT: ABNORMAL
BACTERIA UR CULT: ABNORMAL

## 2022-01-10 ENCOUNTER — PATIENT MESSAGE (OUTPATIENT)
Dept: OBSTETRICS AND GYNECOLOGY | Facility: CLINIC | Age: 32
End: 2022-01-10
Payer: COMMERCIAL

## 2022-01-10 RX ORDER — AMOXICILLIN AND CLAVULANATE POTASSIUM 875; 125 MG/1; MG/1
1 TABLET, FILM COATED ORAL EVERY 12 HOURS
Qty: 14 TABLET | Refills: 0 | Status: SHIPPED | OUTPATIENT
Start: 2022-01-10 | End: 2022-01-17

## 2022-01-11 NOTE — PROGRESS NOTES
Doing well.  Weaned off of suboxone and feeling well.  R/B/A reviewed and all questions answered.  Also has history of CHTN - currently diet controlled.  Interested in genetics.  Wants to do Mat 21 - will call about pricing first.  All questions answered.  Will do BP check at home.  RTC in 4 weeks or sooner if needed.

## 2022-01-24 ENCOUNTER — LAB VISIT (OUTPATIENT)
Dept: LAB | Facility: HOSPITAL | Age: 32
End: 2022-01-24
Attending: STUDENT IN AN ORGANIZED HEALTH CARE EDUCATION/TRAINING PROGRAM
Payer: COMMERCIAL

## 2022-01-24 ENCOUNTER — TELEPHONE (OUTPATIENT)
Dept: OBSTETRICS AND GYNECOLOGY | Facility: CLINIC | Age: 32
End: 2022-01-24
Payer: COMMERCIAL

## 2022-01-24 DIAGNOSIS — R35.0 URINARY FREQUENCY: Primary | ICD-10-CM

## 2022-01-24 DIAGNOSIS — R35.0 URINARY FREQUENCY: ICD-10-CM

## 2022-01-24 LAB
BACTERIA #/AREA URNS AUTO: ABNORMAL /HPF
BILIRUB UR QL STRIP: NEGATIVE
CLARITY UR REFRACT.AUTO: ABNORMAL
COLOR UR AUTO: YELLOW
GLUCOSE UR QL STRIP: NEGATIVE
HGB UR QL STRIP: NEGATIVE
KETONES UR QL STRIP: NEGATIVE
LEUKOCYTE ESTERASE UR QL STRIP: NEGATIVE
MICROSCOPIC COMMENT: ABNORMAL
NITRITE UR QL STRIP: NEGATIVE
PH UR STRIP: 7 [PH] (ref 5–8)
PROT UR QL STRIP: NEGATIVE
SP GR UR STRIP: 1.01 (ref 1–1.03)
URN SPEC COLLECT METH UR: ABNORMAL
WBC #/AREA URNS AUTO: 4 /HPF (ref 0–5)
WBC CLUMPS UR QL AUTO: ABNORMAL
YEAST UR QL AUTO: ABNORMAL

## 2022-01-24 PROCEDURE — 87086 URINE CULTURE/COLONY COUNT: CPT | Performed by: STUDENT IN AN ORGANIZED HEALTH CARE EDUCATION/TRAINING PROGRAM

## 2022-01-24 PROCEDURE — 81001 URINALYSIS AUTO W/SCOPE: CPT | Performed by: STUDENT IN AN ORGANIZED HEALTH CARE EDUCATION/TRAINING PROGRAM

## 2022-01-24 NOTE — TELEPHONE ENCOUNTER
Let's see what the urine culture shows.  Thank you!   Abormal VS: Temp > 100F or < 96.8F; SBP < 90 mmHG; HR > 120bpm; Resp > 24/min

## 2022-01-24 NOTE — TELEPHONE ENCOUNTER
"16 4/7 week OB was treated for an asymptomic UTI, finished the rx but worried it hasn't cleared however not sure if its just that she is anxious now that she knows she had a UTI.  Before she was dx she said she was urinating "out of control" but thought it was pregnancy related.  Still having frequency and maybe even a little pelvic pain.  Dropping off UA and culture this morning.     Do you want her to take another round of abx or wait to see what urine shows?   "

## 2022-01-25 ENCOUNTER — PATIENT MESSAGE (OUTPATIENT)
Dept: ADMINISTRATIVE | Facility: OTHER | Age: 32
End: 2022-01-25
Payer: COMMERCIAL

## 2022-01-25 ENCOUNTER — TELEPHONE (OUTPATIENT)
Dept: OBSTETRICS AND GYNECOLOGY | Facility: CLINIC | Age: 32
End: 2022-01-25
Payer: COMMERCIAL

## 2022-01-25 LAB — BACTERIA UR CULT: NO GROWTH

## 2022-01-25 NOTE — TELEPHONE ENCOUNTER
I would wait for the culture. The colony count on last culture was less than 100K. No reason to over treat especially since she's pregnant.

## 2022-01-25 NOTE — TELEPHONE ENCOUNTER
Pt states she is having intermittent pain right above pubic area/bone and urinary frequency.  Denies vaginal complaints.  Advised per Dr. Murillo.  Informed her the baby could be pressing on her bladder causing the feeling of frequency. Discussed diet and hydration while waiting for the urine culture to result.  She will call tomorrow for prelim results of culture.

## 2022-01-25 NOTE — TELEPHONE ENCOUNTER
"16 5/7 week OB Pt saw UA results on portal and wants abx.  The UA just showed "cloudy" appearance but the microscopic showed "many" WBC clumps, bacteria and yeast.  The culture won't result until tomorrow, Dr. Amaro wanted to wait on results before sending over abx.      What are your thoughts?  Does this look like an infection since the UA didn't show leukocytes or nitrates?  Should I tell the pt we should wait until the culture comes back?    "

## 2022-01-26 ENCOUNTER — PATIENT MESSAGE (OUTPATIENT)
Dept: OBSTETRICS AND GYNECOLOGY | Facility: CLINIC | Age: 32
End: 2022-01-26
Payer: COMMERCIAL

## 2022-01-26 RX ORDER — TERCONAZOLE 8 MG/G
1 CREAM VAGINAL NIGHTLY
Qty: 20 G | Refills: 1 | Status: SHIPPED | OUTPATIENT
Start: 2022-01-26 | End: 2022-02-07

## 2022-02-07 ENCOUNTER — ROUTINE PRENATAL (OUTPATIENT)
Dept: OBSTETRICS AND GYNECOLOGY | Facility: CLINIC | Age: 32
End: 2022-02-07
Attending: STUDENT IN AN ORGANIZED HEALTH CARE EDUCATION/TRAINING PROGRAM
Payer: COMMERCIAL

## 2022-02-07 VITALS — DIASTOLIC BLOOD PRESSURE: 98 MMHG | SYSTOLIC BLOOD PRESSURE: 144 MMHG | BODY MASS INDEX: 27.62 KG/M2 | WEIGHT: 151 LBS

## 2022-02-07 DIAGNOSIS — Z3A.13 13 WEEKS GESTATION OF PREGNANCY: ICD-10-CM

## 2022-02-07 DIAGNOSIS — Z86.79 HISTORY OF ESSENTIAL HYPERTENSION: Primary | ICD-10-CM

## 2022-02-07 PROCEDURE — 99999 PR PBB SHADOW E&M-EST. PATIENT-LVL III: CPT | Mod: PBBFAC,,, | Performed by: STUDENT IN AN ORGANIZED HEALTH CARE EDUCATION/TRAINING PROGRAM

## 2022-02-07 PROCEDURE — 99999 PR PBB SHADOW E&M-EST. PATIENT-LVL III: ICD-10-PCS | Mod: PBBFAC,,, | Performed by: STUDENT IN AN ORGANIZED HEALTH CARE EDUCATION/TRAINING PROGRAM

## 2022-02-07 PROCEDURE — 0502F SUBSEQUENT PRENATAL CARE: CPT | Mod: CPTII,S$GLB,, | Performed by: STUDENT IN AN ORGANIZED HEALTH CARE EDUCATION/TRAINING PROGRAM

## 2022-02-07 PROCEDURE — 0502F PR SUBSEQUENT PRENATAL CARE: ICD-10-PCS | Mod: CPTII,S$GLB,, | Performed by: STUDENT IN AN ORGANIZED HEALTH CARE EDUCATION/TRAINING PROGRAM

## 2022-02-11 ENCOUNTER — PATIENT MESSAGE (OUTPATIENT)
Dept: MATERNAL FETAL MEDICINE | Facility: CLINIC | Age: 32
End: 2022-02-11
Payer: COMMERCIAL

## 2022-02-14 ENCOUNTER — PROCEDURE VISIT (OUTPATIENT)
Dept: MATERNAL FETAL MEDICINE | Facility: CLINIC | Age: 32
End: 2022-02-14
Attending: STUDENT IN AN ORGANIZED HEALTH CARE EDUCATION/TRAINING PROGRAM
Payer: COMMERCIAL

## 2022-02-14 DIAGNOSIS — Z3A.13 13 WEEKS GESTATION OF PREGNANCY: ICD-10-CM

## 2022-02-14 DIAGNOSIS — Z36.89 ENCOUNTER FOR ULTRASOUND TO ASSESS FETAL GROWTH: Primary | ICD-10-CM

## 2022-02-14 PROCEDURE — 76805 OB US >/= 14 WKS SNGL FETUS: CPT | Mod: Q6,S$GLB,, | Performed by: OBSTETRICS & GYNECOLOGY

## 2022-02-14 PROCEDURE — 76805 US MFM PROCEDURE (VIEWPOINT): ICD-10-PCS | Mod: Q6,S$GLB,, | Performed by: OBSTETRICS & GYNECOLOGY

## 2022-03-04 ENCOUNTER — PATIENT MESSAGE (OUTPATIENT)
Dept: MATERNAL FETAL MEDICINE | Facility: CLINIC | Age: 32
End: 2022-03-04
Payer: COMMERCIAL

## 2022-03-09 ENCOUNTER — ROUTINE PRENATAL (OUTPATIENT)
Dept: OBSTETRICS AND GYNECOLOGY | Facility: CLINIC | Age: 32
End: 2022-03-09
Attending: STUDENT IN AN ORGANIZED HEALTH CARE EDUCATION/TRAINING PROGRAM
Payer: COMMERCIAL

## 2022-03-09 ENCOUNTER — LAB VISIT (OUTPATIENT)
Dept: LAB | Facility: OTHER | Age: 32
End: 2022-03-09
Attending: STUDENT IN AN ORGANIZED HEALTH CARE EDUCATION/TRAINING PROGRAM
Payer: COMMERCIAL

## 2022-03-09 DIAGNOSIS — F11.11 HISTORY OF OPIOID ABUSE: ICD-10-CM

## 2022-03-09 DIAGNOSIS — Z3A.22 22 WEEKS GESTATION OF PREGNANCY: ICD-10-CM

## 2022-03-09 DIAGNOSIS — I10 CHRONIC HYPERTENSION: ICD-10-CM

## 2022-03-09 DIAGNOSIS — Z3A.22 22 WEEKS GESTATION OF PREGNANCY: Primary | ICD-10-CM

## 2022-03-09 LAB
ALBUMIN SERPL BCP-MCNC: 3.1 G/DL (ref 3.5–5.2)
ALP SERPL-CCNC: 72 U/L (ref 55–135)
ALT SERPL W/O P-5'-P-CCNC: 13 U/L (ref 10–44)
ANION GAP SERPL CALC-SCNC: 10 MMOL/L (ref 8–16)
AST SERPL-CCNC: 14 U/L (ref 10–40)
BASOPHILS # BLD AUTO: 0.03 K/UL (ref 0–0.2)
BASOPHILS NFR BLD: 0.3 % (ref 0–1.9)
BILIRUB SERPL-MCNC: 0.2 MG/DL (ref 0.1–1)
BUN SERPL-MCNC: 7 MG/DL (ref 6–20)
CALCIUM SERPL-MCNC: 9.6 MG/DL (ref 8.7–10.5)
CHLORIDE SERPL-SCNC: 104 MMOL/L (ref 95–110)
CO2 SERPL-SCNC: 23 MMOL/L (ref 23–29)
CREAT SERPL-MCNC: 0.6 MG/DL (ref 0.5–1.4)
DIFFERENTIAL METHOD: ABNORMAL
EOSINOPHIL # BLD AUTO: 0.1 K/UL (ref 0–0.5)
EOSINOPHIL NFR BLD: 0.8 % (ref 0–8)
ERYTHROCYTE [DISTWIDTH] IN BLOOD BY AUTOMATED COUNT: 12.6 % (ref 11.5–14.5)
EST. GFR  (AFRICAN AMERICAN): >60 ML/MIN/1.73 M^2
EST. GFR  (NON AFRICAN AMERICAN): >60 ML/MIN/1.73 M^2
GLUCOSE SERPL-MCNC: 103 MG/DL (ref 70–110)
HCT VFR BLD AUTO: 33.4 % (ref 37–48.5)
HGB BLD-MCNC: 11.3 G/DL (ref 12–16)
IMM GRANULOCYTES # BLD AUTO: 0.07 K/UL (ref 0–0.04)
IMM GRANULOCYTES NFR BLD AUTO: 0.6 % (ref 0–0.5)
LYMPHOCYTES # BLD AUTO: 2.3 K/UL (ref 1–4.8)
LYMPHOCYTES NFR BLD: 19.9 % (ref 18–48)
MCH RBC QN AUTO: 30.8 PG (ref 27–31)
MCHC RBC AUTO-ENTMCNC: 33.8 G/DL (ref 32–36)
MCV RBC AUTO: 91 FL (ref 82–98)
MONOCYTES # BLD AUTO: 0.7 K/UL (ref 0.3–1)
MONOCYTES NFR BLD: 5.7 % (ref 4–15)
NEUTROPHILS # BLD AUTO: 8.3 K/UL (ref 1.8–7.7)
NEUTROPHILS NFR BLD: 72.7 % (ref 38–73)
NRBC BLD-RTO: 0 /100 WBC
PLATELET # BLD AUTO: 319 K/UL (ref 150–450)
PMV BLD AUTO: 9.9 FL (ref 9.2–12.9)
POTASSIUM SERPL-SCNC: 3.7 MMOL/L (ref 3.5–5.1)
PROT 24H UR-MRATE: NORMAL MG/SPEC (ref 0–100)
PROT SERPL-MCNC: 6.5 G/DL (ref 6–8.4)
PROT UR-MCNC: <7 MG/DL (ref 0–15)
RBC # BLD AUTO: 3.67 M/UL (ref 4–5.4)
SODIUM SERPL-SCNC: 137 MMOL/L (ref 136–145)
URINE COLLECTION DURATION: 24 HR
URINE VOLUME: 1750 ML
WBC # BLD AUTO: 11.41 K/UL (ref 3.9–12.7)

## 2022-03-09 PROCEDURE — 84156 ASSAY OF PROTEIN URINE: CPT | Performed by: STUDENT IN AN ORGANIZED HEALTH CARE EDUCATION/TRAINING PROGRAM

## 2022-03-09 PROCEDURE — 80053 COMPREHEN METABOLIC PANEL: CPT | Performed by: STUDENT IN AN ORGANIZED HEALTH CARE EDUCATION/TRAINING PROGRAM

## 2022-03-09 PROCEDURE — 99999 PR PBB SHADOW E&M-EST. PATIENT-LVL III: CPT | Mod: PBBFAC,,, | Performed by: STUDENT IN AN ORGANIZED HEALTH CARE EDUCATION/TRAINING PROGRAM

## 2022-03-09 PROCEDURE — 85025 COMPLETE CBC W/AUTO DIFF WBC: CPT | Performed by: STUDENT IN AN ORGANIZED HEALTH CARE EDUCATION/TRAINING PROGRAM

## 2022-03-09 PROCEDURE — 99999 PR PBB SHADOW E&M-EST. PATIENT-LVL III: ICD-10-PCS | Mod: PBBFAC,,, | Performed by: STUDENT IN AN ORGANIZED HEALTH CARE EDUCATION/TRAINING PROGRAM

## 2022-03-09 PROCEDURE — 0502F SUBSEQUENT PRENATAL CARE: CPT | Mod: CPTII,S$GLB,, | Performed by: STUDENT IN AN ORGANIZED HEALTH CARE EDUCATION/TRAINING PROGRAM

## 2022-03-09 PROCEDURE — 0502F PR SUBSEQUENT PRENATAL CARE: ICD-10-PCS | Mod: CPTII,S$GLB,, | Performed by: STUDENT IN AN ORGANIZED HEALTH CARE EDUCATION/TRAINING PROGRAM

## 2022-03-09 PROCEDURE — 36415 COLL VENOUS BLD VENIPUNCTURE: CPT | Performed by: STUDENT IN AN ORGANIZED HEALTH CARE EDUCATION/TRAINING PROGRAM

## 2022-03-09 RX ORDER — NAPROXEN SODIUM 220 MG/1
81 TABLET, FILM COATED ORAL DAILY
COMMUNITY

## 2022-03-09 NOTE — PROGRESS NOTES
Doing well.  No complaints.  Reports fetal movement.  Denies contractions, leakage of fluid, vaginal bleeding.  Labs and urine.  Will check BP at home this week and send levels.  Discussed medications if persistently elevated.  R/B/A reviewed and all questions answered.  MFM consultation.  ED precautions reviewed.  All questions answered.  RTC in 2 weeks or sooner if needed.

## 2022-03-10 ENCOUNTER — TELEPHONE (OUTPATIENT)
Dept: MATERNAL FETAL MEDICINE | Facility: CLINIC | Age: 32
End: 2022-03-10
Payer: COMMERCIAL

## 2022-03-10 ENCOUNTER — PATIENT MESSAGE (OUTPATIENT)
Dept: OBSTETRICS AND GYNECOLOGY | Facility: CLINIC | Age: 32
End: 2022-03-10
Payer: COMMERCIAL

## 2022-03-10 DIAGNOSIS — Z36.89 ENCOUNTER FOR ULTRASOUND TO CHECK FETAL GROWTH: Primary | ICD-10-CM

## 2022-03-10 DIAGNOSIS — O16.9 ELEVATED BLOOD PRESSURE AFFECTING PREGNANCY, ANTEPARTUM: ICD-10-CM

## 2022-03-10 NOTE — TELEPHONE ENCOUNTER
Phone call to patient to schedule MFM consult. Offered patient an appointment on 3/18/22 but patient will be out of town. Patient requested to be seen on 4/4/22.     Appointment scheduled for 1pm.    Pt verbalized understanding of information.

## 2022-03-14 VITALS
BODY MASS INDEX: 30.04 KG/M2 | DIASTOLIC BLOOD PRESSURE: 92 MMHG | WEIGHT: 164.25 LBS | SYSTOLIC BLOOD PRESSURE: 150 MMHG

## 2022-03-20 ENCOUNTER — PATIENT MESSAGE (OUTPATIENT)
Dept: OBSTETRICS AND GYNECOLOGY | Facility: CLINIC | Age: 32
End: 2022-03-20
Payer: COMMERCIAL

## 2022-03-21 RX ORDER — NIFEDIPINE 30 MG/1
30 TABLET, EXTENDED RELEASE ORAL DAILY
Qty: 30 TABLET | Refills: 2 | Status: SHIPPED | OUTPATIENT
Start: 2022-03-21 | End: 2022-05-10

## 2022-03-21 NOTE — TELEPHONE ENCOUNTER
Spoke with patient and all questions answered.  R/B/A reviewed.  Will start procardia.  M consult.

## 2022-04-01 ENCOUNTER — PATIENT MESSAGE (OUTPATIENT)
Dept: MATERNAL FETAL MEDICINE | Facility: CLINIC | Age: 32
End: 2022-04-01
Payer: COMMERCIAL

## 2022-04-04 ENCOUNTER — PROCEDURE VISIT (OUTPATIENT)
Dept: MATERNAL FETAL MEDICINE | Facility: CLINIC | Age: 32
End: 2022-04-04
Attending: OBSTETRICS & GYNECOLOGY
Payer: COMMERCIAL

## 2022-04-04 ENCOUNTER — ROUTINE PRENATAL (OUTPATIENT)
Dept: OBSTETRICS AND GYNECOLOGY | Facility: CLINIC | Age: 32
End: 2022-04-04
Attending: STUDENT IN AN ORGANIZED HEALTH CARE EDUCATION/TRAINING PROGRAM
Payer: COMMERCIAL

## 2022-04-04 VITALS
HEIGHT: 62 IN | WEIGHT: 166.88 LBS | DIASTOLIC BLOOD PRESSURE: 86 MMHG | BODY MASS INDEX: 30.71 KG/M2 | SYSTOLIC BLOOD PRESSURE: 132 MMHG

## 2022-04-04 VITALS
SYSTOLIC BLOOD PRESSURE: 128 MMHG | DIASTOLIC BLOOD PRESSURE: 90 MMHG | WEIGHT: 166.69 LBS | BODY MASS INDEX: 30.48 KG/M2

## 2022-04-04 DIAGNOSIS — O16.9 ELEVATED BLOOD PRESSURE AFFECTING PREGNANCY, ANTEPARTUM: ICD-10-CM

## 2022-04-04 DIAGNOSIS — Z36.89 ENCOUNTER FOR ULTRASOUND TO CHECK FETAL GROWTH: ICD-10-CM

## 2022-04-04 DIAGNOSIS — O16.2 HYPERTENSION AFFECTING PREGNANCY IN SECOND TRIMESTER: ICD-10-CM

## 2022-04-04 DIAGNOSIS — O35.EXX0 PYELECTASIS OF FETUS ON PRENATAL ULTRASOUND: ICD-10-CM

## 2022-04-04 DIAGNOSIS — Z3A.26 26 WEEKS GESTATION OF PREGNANCY: Primary | ICD-10-CM

## 2022-04-04 PROCEDURE — 1159F PR MEDICATION LIST DOCUMENTED IN MEDICAL RECORD: ICD-10-PCS | Mod: CPTII,S$GLB,, | Performed by: OBSTETRICS & GYNECOLOGY

## 2022-04-04 PROCEDURE — 3079F DIAST BP 80-89 MM HG: CPT | Mod: CPTII,S$GLB,, | Performed by: OBSTETRICS & GYNECOLOGY

## 2022-04-04 PROCEDURE — 3075F SYST BP GE 130 - 139MM HG: CPT | Mod: CPTII,S$GLB,, | Performed by: OBSTETRICS & GYNECOLOGY

## 2022-04-04 PROCEDURE — 3008F BODY MASS INDEX DOCD: CPT | Mod: CPTII,S$GLB,, | Performed by: OBSTETRICS & GYNECOLOGY

## 2022-04-04 PROCEDURE — 3075F PR MOST RECENT SYSTOLIC BLOOD PRESS GE 130-139MM HG: ICD-10-PCS | Mod: CPTII,S$GLB,, | Performed by: OBSTETRICS & GYNECOLOGY

## 2022-04-04 PROCEDURE — 99999 PR PBB SHADOW E&M-EST. PATIENT-LVL III: CPT | Mod: PBBFAC,,, | Performed by: STUDENT IN AN ORGANIZED HEALTH CARE EDUCATION/TRAINING PROGRAM

## 2022-04-04 PROCEDURE — 99214 OFFICE O/P EST MOD 30 MIN: CPT | Mod: 25,S$GLB,, | Performed by: OBSTETRICS & GYNECOLOGY

## 2022-04-04 PROCEDURE — 76816 PR  US,PREGNANT UTERUS,F/U,TRANSABD APP: ICD-10-PCS | Mod: S$GLB,,, | Performed by: OBSTETRICS & GYNECOLOGY

## 2022-04-04 PROCEDURE — 76816 OB US FOLLOW-UP PER FETUS: CPT | Mod: S$GLB,,, | Performed by: OBSTETRICS & GYNECOLOGY

## 2022-04-04 PROCEDURE — 3008F PR BODY MASS INDEX (BMI) DOCUMENTED: ICD-10-PCS | Mod: CPTII,S$GLB,, | Performed by: OBSTETRICS & GYNECOLOGY

## 2022-04-04 PROCEDURE — 99999 PR PBB SHADOW E&M-EST. PATIENT-LVL III: ICD-10-PCS | Mod: PBBFAC,,, | Performed by: STUDENT IN AN ORGANIZED HEALTH CARE EDUCATION/TRAINING PROGRAM

## 2022-04-04 PROCEDURE — 0502F PR SUBSEQUENT PRENATAL CARE: ICD-10-PCS | Mod: CPTII,S$GLB,, | Performed by: STUDENT IN AN ORGANIZED HEALTH CARE EDUCATION/TRAINING PROGRAM

## 2022-04-04 PROCEDURE — 99999 PR PBB SHADOW E&M-EST. PATIENT-LVL III: ICD-10-PCS | Mod: PBBFAC,,, | Performed by: OBSTETRICS & GYNECOLOGY

## 2022-04-04 PROCEDURE — 99999 PR PBB SHADOW E&M-EST. PATIENT-LVL III: CPT | Mod: PBBFAC,,, | Performed by: OBSTETRICS & GYNECOLOGY

## 2022-04-04 PROCEDURE — 3079F PR MOST RECENT DIASTOLIC BLOOD PRESSURE 80-89 MM HG: ICD-10-PCS | Mod: CPTII,S$GLB,, | Performed by: OBSTETRICS & GYNECOLOGY

## 2022-04-04 PROCEDURE — 1159F MED LIST DOCD IN RCRD: CPT | Mod: CPTII,S$GLB,, | Performed by: OBSTETRICS & GYNECOLOGY

## 2022-04-04 PROCEDURE — 0502F SUBSEQUENT PRENATAL CARE: CPT | Mod: CPTII,S$GLB,, | Performed by: STUDENT IN AN ORGANIZED HEALTH CARE EDUCATION/TRAINING PROGRAM

## 2022-04-04 PROCEDURE — 99214 PR OFFICE/OUTPT VISIT, EST, LEVL IV, 30-39 MIN: ICD-10-PCS | Mod: 25,S$GLB,, | Performed by: OBSTETRICS & GYNECOLOGY

## 2022-04-04 NOTE — PROGRESS NOTES
Chief complaint: CHTN    Provider requesting consultation: Dr. Estrella    31 y.o. at 26w4d EGA.    PMH:  Past Medical History:   Diagnosis Date    Hypertension     Substance abuse        PObHx:  OB History    Para Term  AB Living   1             SAB IAB Ectopic Multiple Live Births                  # Outcome Date GA Lbr Live/2nd Weight Sex Delivery Anes PTL Lv   1 Current               Obstetric Comments   Menarche age 11/13       PSH:  Past Surgical History:   Procedure Laterality Date    ANKLE SURGERY Left     TONSILLECTOMY         Family history:family history includes Breast cancer in her paternal grandmother; Diabetes in her father; Hypertension in her father.    Social history: reports that she has quit smoking. She has never used smokeless tobacco. She reports previous alcohol use. She reports that she does not use drugs.    A detailed fetal anatomical ultrasound was completed today.  See details in imaging section of EPIC.    Chronic Hypertension  The patient is referred for a 5 year history of CHTN treated prior to pregnancy with Lisinopril.   I counseled the patient on maternal/fetal risks associated with CHTN during pregnancy. Risks include but not limited to fetal growth restriction, miscarriage, abruption, maternal end organ disease (renal failure, MI, and stroke),  delivery, development of superimposed preeclampsia, and eclampsia. She was counseled on the recommendations for blood pressure control, serial ultrasound for fetal growth assessment and  testing, and timing of delivery. I also counseled her on the recommendation for aspirin 81 mg daily which may decrease her risk of developing superimposed preeclampsia.     Recommendations (Please refer to [facility] guidelines):  -Initiate aspirin 81 mg daily at 12-16 weeks gestation for preeclampsia risk reduction  -Continue current medications: Procardia XL 30 mg daily  -Baseline evaluation with primary OB:     24-hour urine protein or baseline P/C ratio, CMP, and CBC.   Maternal EKG   Maternal ophthalmic evaluation   Maternal echocardiogram if HTN has been long-standing or EKG is abnormal  -Serial fetal growth ultrasounds every 4-6 weeks, beginning at 26-28 weeks.   -Continued close observation of patient's blood pressures. Avoid hypotension as this has been associated with uteroplacental insufficiency.  -If BP < 160/105 and no evidence of end organ damage, no medication treatment needed   -If BP is persistently ?160/105, antihypertensive medication is recommended with goal BP of 120-160/.     -Weekly antepartum testing at 32 weeks (NST+AFV); twice weekly testing if control is poor, multiple comorbidities are present, or requires several medications for control   -Delivery timing:  No medications, no comorbid conditions: 39 0/7 - 39 6/7 weeks gestation  No medications, comorbid conditions: 38 0/7 - 38 6/7 weeks gestation  Controlled on single agent, no comorbid conditions: 38 0/7 - 38 6/7 weeks gestation  Controlled on single agent, comorbid conditions: 37 0/7 - 38 6/7 weeks gestation  Uncontrolled or requiring ? 2 medications: 36 0/7 - 37 6/7 weeks gestation    Comorbid conditions include BMI >= 40, diabetes, and complex medical condition associated with placental dysfunction (ie lupus or other vascular disease)  Delivery may be recommended earlier pending results of fetal growth ultrasounds, AFV assessment, or antepartum testing results.      In addition, on today's ultrasound bilateral pyelectasis was identified.  I discussed with the patient today that historically 3% of normal fetuses have the finding of renal pelviectasis.  I also counseled the patient rarely renal pelviectasis is associated with chromosomal abnormalities such as Down syndrome.  However since no other findings of fetal aneuploidy were seen on ultrasound, and her NIPD was negative, no further genetic testing is indicated.  I also  discussed with the patient that rarely renal pelviectasis could progress to renal obstruction that would necessitate  evaluation and possible intervention.    After today's assessment I would recommend repeat ultrasound assessment at 32-34 gestation for interval fetal growth and reinspection of fetal kidneys.  With your permission we have taken the liberty today to schedule this follow up appointment.    The patient was given an opportunity to ask questions about management and the diease process.  She expressed an understanding of and agreement to the above impression and plan. All questions were answered to her satisfaction.  She was given contact information to the [facility] clinic to address further concerns.      I spent a total of 30 minutes on the day of the visit. This includes face to face time and non-face to face time preparing to see the patient (eg, review of tests), Obtaining and/or reviewing separately obtained history, Documenting clinical information in the electronic or other health record, Independently interpreting results and communicating results to the patient/family/caregiver, or Care coordination.

## 2022-04-05 ENCOUNTER — TELEPHONE (OUTPATIENT)
Dept: OBSTETRICS AND GYNECOLOGY | Facility: CLINIC | Age: 32
End: 2022-04-05
Payer: COMMERCIAL

## 2022-04-05 DIAGNOSIS — Z3A.26 26 WEEKS GESTATION OF PREGNANCY: Primary | ICD-10-CM

## 2022-04-05 DIAGNOSIS — Z34.90 PREGNANCY, UNSPECIFIED GESTATIONAL AGE: ICD-10-CM

## 2022-04-05 NOTE — TELEPHONE ENCOUNTER
----- Message from Adair Alexander MA sent at 4/4/2022  4:41 PM CDT -----  Please set this patient up for twice weekly testing (BPP and NST) in Prenatal Testing starting at 32 weeks. She has CHTN on meds.

## 2022-04-05 NOTE — PROGRESS NOTES
Doing well.  No complaints.  Reports fetal movement.  Denies contractions, leakage of fluid, vaginal bleeding.  Doing well on procardia 30 XL daily.  Has MFM consult today.  Needs glucose and CBC.  All questions answered.  RTC in 2 weeks or sooner if needed.

## 2022-04-14 ENCOUNTER — PATIENT MESSAGE (OUTPATIENT)
Dept: ADMINISTRATIVE | Facility: OTHER | Age: 32
End: 2022-04-14
Payer: COMMERCIAL

## 2022-04-20 ENCOUNTER — PATIENT MESSAGE (OUTPATIENT)
Dept: OBSTETRICS AND GYNECOLOGY | Facility: CLINIC | Age: 32
End: 2022-04-20
Payer: COMMERCIAL

## 2022-04-21 ENCOUNTER — ANESTHESIA EVENT (OUTPATIENT)
Dept: OBSTETRICS AND GYNECOLOGY | Facility: OTHER | Age: 32
End: 2022-04-21

## 2022-04-21 ENCOUNTER — ANESTHESIA (OUTPATIENT)
Dept: OBSTETRICS AND GYNECOLOGY | Facility: OTHER | Age: 32
End: 2022-04-21

## 2022-04-21 ENCOUNTER — ROUTINE PRENATAL (OUTPATIENT)
Dept: OBSTETRICS AND GYNECOLOGY | Facility: CLINIC | Age: 32
End: 2022-04-21
Attending: STUDENT IN AN ORGANIZED HEALTH CARE EDUCATION/TRAINING PROGRAM
Payer: COMMERCIAL

## 2022-04-21 ENCOUNTER — HOSPITAL ENCOUNTER (OUTPATIENT)
Facility: OTHER | Age: 32
Discharge: HOME | End: 2022-04-22
Attending: STUDENT IN AN ORGANIZED HEALTH CARE EDUCATION/TRAINING PROGRAM | Admitting: OBSTETRICS & GYNECOLOGY
Payer: COMMERCIAL

## 2022-04-21 ENCOUNTER — TELEPHONE (OUTPATIENT)
Dept: OBSTETRICS AND GYNECOLOGY | Facility: CLINIC | Age: 32
End: 2022-04-21
Payer: COMMERCIAL

## 2022-04-21 VITALS — WEIGHT: 174.5 LBS | SYSTOLIC BLOOD PRESSURE: 150 MMHG | BODY MASS INDEX: 31.92 KG/M2 | DIASTOLIC BLOOD PRESSURE: 86 MMHG

## 2022-04-21 DIAGNOSIS — R82.90 ABNORMAL URINE FINDINGS: Primary | ICD-10-CM

## 2022-04-21 DIAGNOSIS — I10 CHRONIC HYPERTENSION: Primary | ICD-10-CM

## 2022-04-21 DIAGNOSIS — Z3A.29 29 WEEKS GESTATION OF PREGNANCY: ICD-10-CM

## 2022-04-21 DIAGNOSIS — O10.919 CHRONIC HYPERTENSION AFFECTING PREGNANCY: ICD-10-CM

## 2022-04-21 LAB
ALBUMIN SERPL BCP-MCNC: 3 G/DL (ref 3.5–5.2)
ALP SERPL-CCNC: 111 U/L (ref 55–135)
ALT SERPL W/O P-5'-P-CCNC: 14 U/L (ref 10–44)
ANION GAP SERPL CALC-SCNC: 14 MMOL/L (ref 8–16)
AST SERPL-CCNC: 19 U/L (ref 10–40)
BACTERIA #/AREA URNS HPF: ABNORMAL /HPF
BASOPHILS # BLD AUTO: 0.03 K/UL (ref 0–0.2)
BASOPHILS NFR BLD: 0.3 % (ref 0–1.9)
BILIRUB SERPL-MCNC: 0.3 MG/DL (ref 0.1–1)
BILIRUB SERPL-MCNC: ABNORMAL MG/DL
BILIRUB UR QL STRIP: NEGATIVE
BLOOD URINE, POC: 1
BUN SERPL-MCNC: 9 MG/DL (ref 6–20)
CALCIUM SERPL-MCNC: 9.3 MG/DL (ref 8.7–10.5)
CHLORIDE SERPL-SCNC: 105 MMOL/L (ref 95–110)
CLARITY UR: ABNORMAL
CLARITY, POC UA: CLEAR
CO2 SERPL-SCNC: 18 MMOL/L (ref 23–29)
COLOR UR: YELLOW
COLOR, POC UA: YELLOW
CREAT SERPL-MCNC: 0.5 MG/DL (ref 0.5–1.4)
CREAT UR-MCNC: 139.5 MG/DL (ref 15–325)
CREAT UR-MCNC: 152 MG/DL (ref 15–325)
DIFFERENTIAL METHOD: ABNORMAL
EOSINOPHIL # BLD AUTO: 0.1 K/UL (ref 0–0.5)
EOSINOPHIL NFR BLD: 0.7 % (ref 0–8)
ERYTHROCYTE [DISTWIDTH] IN BLOOD BY AUTOMATED COUNT: 13.1 % (ref 11.5–14.5)
EST. GFR  (AFRICAN AMERICAN): >60 ML/MIN/1.73 M^2
EST. GFR  (NON AFRICAN AMERICAN): >60 ML/MIN/1.73 M^2
GLUCOSE SERPL-MCNC: 120 MG/DL (ref 70–110)
GLUCOSE UR QL STRIP: ABNORMAL
GLUCOSE UR QL STRIP: NEGATIVE
HCT VFR BLD AUTO: 35.5 % (ref 37–48.5)
HGB BLD-MCNC: 11.9 G/DL (ref 12–16)
HGB UR QL STRIP: ABNORMAL
HIV 1+2 AB+HIV1 P24 AG SERPL QL IA: NEGATIVE
IMM GRANULOCYTES # BLD AUTO: 0.12 K/UL (ref 0–0.04)
IMM GRANULOCYTES NFR BLD AUTO: 1 % (ref 0–0.5)
KETONES UR QL STRIP: ABNORMAL
KETONES UR QL STRIP: ABNORMAL
LEUKOCYTE ESTERASE UR QL STRIP: ABNORMAL
LEUKOCYTE ESTERASE URINE, POC: ABNORMAL
LYMPHOCYTES # BLD AUTO: 1.8 K/UL (ref 1–4.8)
LYMPHOCYTES NFR BLD: 15.6 % (ref 18–48)
MCH RBC QN AUTO: 31.2 PG (ref 27–31)
MCHC RBC AUTO-ENTMCNC: 33.5 G/DL (ref 32–36)
MCV RBC AUTO: 93 FL (ref 82–98)
MICROSCOPIC COMMENT: ABNORMAL
MONOCYTES # BLD AUTO: 0.6 K/UL (ref 0.3–1)
MONOCYTES NFR BLD: 5 % (ref 4–15)
NEUTROPHILS # BLD AUTO: 9.1 K/UL (ref 1.8–7.7)
NEUTROPHILS NFR BLD: 77.4 % (ref 38–73)
NITRITE UR QL STRIP: NEGATIVE
NITRITE, POC UA: ABNORMAL
NRBC BLD-RTO: 0 /100 WBC
PH UR STRIP: 6 [PH] (ref 5–8)
PH, POC UA: 5
PLATELET # BLD AUTO: 273 K/UL (ref 150–450)
PMV BLD AUTO: 10.2 FL (ref 9.2–12.9)
POTASSIUM SERPL-SCNC: 4 MMOL/L (ref 3.5–5.1)
PROT SERPL-MCNC: 6.6 G/DL (ref 6–8.4)
PROT UR QL STRIP: NEGATIVE
PROT UR-MCNC: 20 MG/DL (ref 0–15)
PROT UR-MCNC: 26 MG/DL (ref 0–15)
PROT/CREAT UR: 0.14 MG/G{CREAT} (ref 0–0.2)
PROT/CREAT UR: 0.17 MG/G{CREAT} (ref 0–0.2)
PROTEIN, POC: ABNORMAL
RBC # BLD AUTO: 3.81 M/UL (ref 4–5.4)
RBC #/AREA URNS HPF: 3 /HPF (ref 0–4)
SODIUM SERPL-SCNC: 137 MMOL/L (ref 136–145)
SP GR UR STRIP: <=1.005 (ref 1–1.03)
SPECIFIC GRAVITY, POC UA: 1015
SQUAMOUS #/AREA URNS HPF: 3 /HPF
URN SPEC COLLECT METH UR: ABNORMAL
UROBILINOGEN UR STRIP-ACNC: NEGATIVE EU/DL
UROBILINOGEN, POC UA: ABNORMAL
WBC # BLD AUTO: 11.7 K/UL (ref 3.9–12.7)
WBC #/AREA URNS HPF: 7 /HPF (ref 0–5)

## 2022-04-21 PROCEDURE — 0502F SUBSEQUENT PRENATAL CARE: CPT | Mod: CPTII,S$GLB,, | Performed by: STUDENT IN AN ORGANIZED HEALTH CARE EDUCATION/TRAINING PROGRAM

## 2022-04-21 PROCEDURE — 84156 ASSAY OF PROTEIN URINE: CPT | Performed by: STUDENT IN AN ORGANIZED HEALTH CARE EDUCATION/TRAINING PROGRAM

## 2022-04-21 PROCEDURE — 80053 COMPREHEN METABOLIC PANEL: CPT | Performed by: STUDENT IN AN ORGANIZED HEALTH CARE EDUCATION/TRAINING PROGRAM

## 2022-04-21 PROCEDURE — G0378 HOSPITAL OBSERVATION PER HR: HCPCS

## 2022-04-21 PROCEDURE — 11000001 HC ACUTE MED/SURG PRIVATE ROOM

## 2022-04-21 PROCEDURE — 99999 PR PBB SHADOW E&M-EST. PATIENT-LVL I: CPT | Mod: PBBFAC,,, | Performed by: STUDENT IN AN ORGANIZED HEALTH CARE EDUCATION/TRAINING PROGRAM

## 2022-04-21 PROCEDURE — 87389 HIV-1 AG W/HIV-1&-2 AB AG IA: CPT | Performed by: STUDENT IN AN ORGANIZED HEALTH CARE EDUCATION/TRAINING PROGRAM

## 2022-04-21 PROCEDURE — 81000 URINALYSIS NONAUTO W/SCOPE: CPT | Performed by: OBSTETRICS & GYNECOLOGY

## 2022-04-21 PROCEDURE — 87086 URINE CULTURE/COLONY COUNT: CPT | Mod: 59 | Performed by: OBSTETRICS & GYNECOLOGY

## 2022-04-21 PROCEDURE — 99999 PR PBB SHADOW E&M-EST. PATIENT-LVL I: ICD-10-PCS | Mod: PBBFAC,,, | Performed by: STUDENT IN AN ORGANIZED HEALTH CARE EDUCATION/TRAINING PROGRAM

## 2022-04-21 PROCEDURE — 87086 URINE CULTURE/COLONY COUNT: CPT | Performed by: STUDENT IN AN ORGANIZED HEALTH CARE EDUCATION/TRAINING PROGRAM

## 2022-04-21 PROCEDURE — 82570 ASSAY OF URINE CREATININE: CPT | Mod: 91 | Performed by: STUDENT IN AN ORGANIZED HEALTH CARE EDUCATION/TRAINING PROGRAM

## 2022-04-21 PROCEDURE — 86592 SYPHILIS TEST NON-TREP QUAL: CPT | Performed by: STUDENT IN AN ORGANIZED HEALTH CARE EDUCATION/TRAINING PROGRAM

## 2022-04-21 PROCEDURE — 59025 FETAL NON-STRESS TEST: CPT

## 2022-04-21 PROCEDURE — 0502F PR SUBSEQUENT PRENATAL CARE: ICD-10-PCS | Mod: CPTII,S$GLB,, | Performed by: STUDENT IN AN ORGANIZED HEALTH CARE EDUCATION/TRAINING PROGRAM

## 2022-04-21 PROCEDURE — 81002 URINALYSIS NONAUTO W/O SCOPE: CPT | Mod: S$GLB,,, | Performed by: STUDENT IN AN ORGANIZED HEALTH CARE EDUCATION/TRAINING PROGRAM

## 2022-04-21 PROCEDURE — 85025 COMPLETE CBC W/AUTO DIFF WBC: CPT | Performed by: STUDENT IN AN ORGANIZED HEALTH CARE EDUCATION/TRAINING PROGRAM

## 2022-04-21 PROCEDURE — 25000003 PHARM REV CODE 250: Performed by: STUDENT IN AN ORGANIZED HEALTH CARE EDUCATION/TRAINING PROGRAM

## 2022-04-21 PROCEDURE — 99285 EMERGENCY DEPT VISIT HI MDM: CPT | Mod: 25

## 2022-04-21 PROCEDURE — 81002 POCT URINE DIPSTICK WITHOUT MICROSCOPE: ICD-10-PCS | Mod: S$GLB,,, | Performed by: STUDENT IN AN ORGANIZED HEALTH CARE EDUCATION/TRAINING PROGRAM

## 2022-04-21 RX ORDER — PRENATAL WITH FERROUS FUM AND FOLIC ACID 3080; 920; 120; 400; 22; 1.84; 3; 20; 10; 1; 12; 200; 27; 25; 2 [IU]/1; [IU]/1; MG/1; [IU]/1; MG/1; MG/1; MG/1; MG/1; MG/1; MG/1; UG/1; MG/1; MG/1; MG/1; MG/1
1 TABLET ORAL DAILY
Status: DISCONTINUED | OUTPATIENT
Start: 2022-04-22 | End: 2022-04-22 | Stop reason: HOSPADM

## 2022-04-21 RX ORDER — NIFEDIPINE 30 MG/1
60 TABLET, EXTENDED RELEASE ORAL ONCE
Status: COMPLETED | OUTPATIENT
Start: 2022-04-21 | End: 2022-04-21

## 2022-04-21 RX ORDER — ACETAMINOPHEN 325 MG/1
650 TABLET ORAL EVERY 6 HOURS PRN
Status: DISCONTINUED | OUTPATIENT
Start: 2022-04-21 | End: 2022-04-22 | Stop reason: HOSPADM

## 2022-04-21 RX ORDER — SODIUM CHLORIDE 0.9 % (FLUSH) 0.9 %
10 SYRINGE (ML) INJECTION
Status: DISCONTINUED | OUTPATIENT
Start: 2022-04-21 | End: 2022-04-22 | Stop reason: HOSPADM

## 2022-04-21 RX ORDER — NIFEDIPINE 10 MG/1
10 CAPSULE ORAL ONCE
Status: COMPLETED | OUTPATIENT
Start: 2022-04-21 | End: 2022-04-21

## 2022-04-21 RX ADMIN — NIFEDIPINE 10 MG: 10 CAPSULE ORAL at 02:04

## 2022-04-21 RX ADMIN — NIFEDIPINE 60 MG: 30 TABLET, FILM COATED, EXTENDED RELEASE ORAL at 11:04

## 2022-04-21 NOTE — ANESTHESIA PREPROCEDURE EVALUATION
[facility]  Anesthesia Pre-Operative Evaluation         Patient Name: Cherrie Wells  YOB: 1990  MRN: [id number]    2022      Cherrie Wells is a 31 y.o. female  @ 29w0d who presents for monitoring of blood pressures. Pertinent medical history for this pregnancy include chronic hypertension    Patient denies bleeding disorders, spine surgery, and asthma     OB History    Para Term  AB Living   1             SAB IAB Ectopic Multiple Live Births                  # Outcome Date GA Lbr Live/2nd Weight Sex Delivery Anes PTL Lv   1 Current               Obstetric Comments   Menarche age [age]       Review of patient's allergies indicates:  No Known Allergies    Wt Readings from Last 1 Encounters:   22 1522 79.2 kg (174 lb 9.7 oz)       BP Readings from Last 3 Encounters:   22 133/74   22 (!) 150/86   22 132/86       Patient Active Problem List   Diagnosis    Well woman exam    Hypertension affecting pregnancy in second trimester    Pyelectasis of fetus on prenatal ultrasound       Past Surgical History:   Procedure Laterality Date    ANKLE SURGERY Left 2003    TONSILLECTOMY         Social History     Socioeconomic History    Marital status: Single   Tobacco Use    Smoking status: Former Smoker    Smokeless tobacco: Never Used   Substance and Sexual Activity    Alcohol use: Not Currently     Comment: social    Drug use: No    Sexual activity: Yes     Partners: Male     Birth control/protection: None         Chemistry        Component Value Date/Time     2022 1145    K 4.0 2022 1145     2022 1145    CO2 18 (L) 2022 1145    BUN 9 2022 1145    CREATININE 0.5 2022 1145     (H) 2022 1145        Component Value Date/Time    CALCIUM 9.3 2022 1145    ALKPHOS 111 2022 1145    AST 19 2022 1145    ALT 14 2022  1145    BILITOT 0.3 04/21/2022 1145    ESTGFRAFRICA >60 04/21/2022 1145    EGFRNONAA >60 04/21/2022 1145            Lab Results   Component Value Date    WBC 11.70 04/21/2022    HGB 11.9 (L) 04/21/2022    HCT 35.5 (L) 04/21/2022    MCV 93 04/21/2022     04/21/2022       No results for input(s): PT, INR, PROTIME, APTT in the last 72 hours.            Pre-op Assessment    I have reviewed the Patient Summary Reports.     I have reviewed the Nursing Notes.    I have reviewed the Medications.     Review of Systems  Anesthesia Hx:  No problems with previous Anesthesia  History of prior surgery of interest to airway management or planning: Denies Family Hx of Anesthesia complications.   Denies Personal Hx of Anesthesia complications.   EENT/Dental:EENT/Dental Normal   Cardiovascular:   Hypertension    Pulmonary:  Pulmonary Normal    Renal/:  Renal/ Normal     Hepatic/GI:  Hepatic/GI Normal    Musculoskeletal:  Musculoskeletal Normal    Neurological:  Neurology Normal    Endocrine:  Endocrine Normal        Physical Exam  General: Well nourished, Cooperative, Alert and Oriented    Airway:  Mallampati: II   Mouth Opening: Normal  TM Distance: Normal  Tongue: Normal  Neck ROM: Normal ROM    Dental:  Intact        Anesthesia Plan  Type of Anesthesia, risks & benefits discussed:    Anesthesia Type: Epidural  Intra-op Monitoring Plan: Standard ASA Monitors  Post Op Pain Control Plan: multimodal analgesia and IV/PO Opioids PRN  Informed Consent: Informed consent signed with the Patient and all parties understand the risks and agree with anesthesia plan.  All questions answered.   ASA Score: 2  Day of Surgery Review of History & Physical: H&P Update referred to the surgeon/provider.    Ready For Surgery From Anesthesia Perspective.     .

## 2022-04-21 NOTE — H&P
HISTORY AND PHYSICAL                                                OBSTETRICS          Subjective:       Cherrie Wells is a 31 y.o.  female with IUP at 29w0d weeks gestation who presents to L&D for monitoring of blood pressures. Pertinent medical history for this pregnancy include chronic hypertension.  Patient denies contractions, denies vaginal bleeding, denies LOF.   Fetal Movement: normal.     PMHx:   Past Medical History:   Diagnosis Date    Hypertension     Substance abuse        PSHx:   Past Surgical History:   Procedure Laterality Date    ANKLE SURGERY Left 2003    TONSILLECTOMY         All: Review of patient's allergies indicates:  No Known Allergies    Meds: (Not in a hospital admission)      SH:   Social History     Socioeconomic History    Marital status: Single   Tobacco Use    Smoking status: Former Smoker    Smokeless tobacco: Never Used   Substance and Sexual Activity    Alcohol use: Not Currently     Comment: social    Drug use: No    Sexual activity: Yes     Partners: Male     Birth control/protection: None       FH:   Family History   Problem Relation Age of Onset    Breast cancer Paternal Grandmother     Diabetes Father     Hypertension Father     Colon cancer Neg Hx     Ovarian cancer Neg Hx        OBHx:   OB History    Para Term  AB Living   1 0 0 0 0 0   SAB IAB Ectopic Multiple Live Births   0 0 0 0 0      # Outcome Date GA Lbr Live/2nd Weight Sex Delivery Anes PTL Lv   1 Current               Obstetric Comments   Menarche age [age]       Objective:       /74   Pulse (!) 116   Temp 98.7 °F (37.1 °C)   Resp 18   LMP 09/15/2021   SpO2 96%     Vitals:    22 1445 22 1500 22 1512 22 1522   BP: (!) 147/98 (!) 147/100 132/78 133/74   Pulse: 105 106 (!) 116    Resp:       Temp:       SpO2: 96%          General:   alert and cooperative   Lungs:   clear to auscultation bilaterally   Heart:   regular rate and rhythm, S1, S2 normal,  no murmur, click, rub or gallop   Abdomen:  soft, non-tender; bowel sounds normal; no masses,  no organomegaly   Extremities negative edema, negative erythema   FHT: 130's Cat 1 (reassuring)                 TOCO: Irregular contractions       Cervix:     Dilation: Closed    Effacement: Long    Station:  -3    Consistency: soft    Position: anterior        Assessment:       29w0d weeks gestation.  Chronic hypertension            Plan:      Risks, benefits, alternatives and possible complications have been discussed in detail with the patient.   - Consents signed and to chart  - observation for blood pressures  Increased procardia in triage and was given IR procardia   Discussed optimizing blood pressure in house and follow up with Bennie jon

## 2022-04-21 NOTE — ED PROVIDER NOTES
Encounter Date: 2022       History     Chief Complaint   Patient presents with    Hypertension     HPI     Cherrie Wells is a 31 y.o. F at 29w0d sent to [facility] today from clinic by primary OB due to having elevated BP of 150/86, and complaints of HA, lightheadedness and swelling over the past few days. She also reports feeling hot but no fever or chills. She reported home measured pressures of:   : 138-160/110-112  : 142/103  : 160/110  She is currently not having HA or swelling.    This IUP is complicated by cHTN, diagnosed for the past 5 years. She was previously on lisinopril but transitioned to procardia 30mg XL for pregnancy. She also takes aspirin 81.  Primary OB discussed increasing procardia from 30 to 60 with patient.  Patient also received 10 mg IR in triage blood no 133/74  Patient denies contractions, denies vaginal bleeding, denies LOF.   Fetal Movement: normal.       Review of patient's allergies indicates:  No Known Allergies  Past Medical History:   Diagnosis Date    Hypertension     Substance abuse      Past Surgical History:   Procedure Laterality Date    ANKLE SURGERY Left 2003    TONSILLECTOMY       Family History   Problem Relation Age of Onset    Breast cancer Paternal Grandmother     Diabetes Father     Hypertension Father     Colon cancer Neg Hx     Ovarian cancer Neg Hx      Social History     Tobacco Use    Smoking status: Former Smoker    Smokeless tobacco: Never Used   Substance Use Topics    Alcohol use: Not Currently     Comment: social    Drug use: No     Review of Systems   Constitutional: Negative for fever.        Feeling hot   HENT: Negative for sore throat.    Eyes: Negative for visual disturbance.   Respiratory: Negative for shortness of breath.    Cardiovascular: Negative for chest pain.   Gastrointestinal: Negative for nausea.   Genitourinary: Negative for dysuria and vaginal bleeding.   Musculoskeletal: Negative for back pain.   Skin:  Negative for rash.   Neurological: Negative for dizziness, weakness, light-headedness and headaches.   Hematological: Does not bruise/bleed easily.       Physical Exam     Initial Vitals [04/21/22 1125]   BP Pulse Resp Temp SpO2   (!) 163/108 107 18 98.7 °F (37.1 °C) 99 %      MAP       --         Physical Exam    Nursing note and vitals reviewed.  Constitutional: Vital signs are normal. She appears well-developed and well-nourished. Breathing: Normal.   HENT:   Head: Normocephalic and atraumatic.   Eyes: EOM are normal.   Neck:   Normal range of motion.  Cardiovascular: Normal rate, intact distal pulses and normal pulses.   Pulmonary/Chest: No respiratory distress.   Normal work of breathing   Abdominal: Abdomen is soft. She exhibits no distension. There is no abdominal tenderness.   Musculoskeletal:         General: No edema. Normal range of motion.      Cervical back: Normal range of motion.     Neurological: She is alert and oriented to person, place, and time. She has normal strength.   Skin: Skin is warm and dry.   Psychiatric: Her behavior is normal.         ED Course   Obtain Fetal nonstress test (NST)    Date/Time: 4/21/2022 11:32 AM  Performed by: Zully Cantrell MD  Authorized by: Zully Cantrell MD     Nonstress Test:     Variability:  6-25 BPM    Decelerations:  None    Accelerations:  15 bpm    Baseline:  150    Contractions:  Not present  Biophysical Profile:     Nonstress Test Interpretation: appropriate for gestational age      Overall Impression:  Reassuring  Post-procedure:     Patient tolerance:  Patient tolerated the procedure well with no immediate complications      Labs Reviewed   COMPREHENSIVE METABOLIC PANEL - Abnormal; Notable for the following components:       Result Value    CO2 18 (*)     Glucose 120 (*)     Albumin 3.0 (*)     All other components within normal limits    Narrative:     Release to patient->Immediate   CBC W/ AUTO DIFFERENTIAL - Abnormal; Notable for the  following components:    RBC 3.81 (*)     Hemoglobin 11.9 (*)     Hematocrit 35.5 (*)     MCH 31.2 (*)     Immature Granulocytes 1.0 (*)     Gran # (ANC) 9.1 (*)     Immature Grans (Abs) 0.12 (*)     Gran % 77.4 (*)     Lymph % 15.6 (*)     All other components within normal limits    Narrative:     Release to patient->Immediate   PROTEIN / CREATININE RATIO, URINE - Abnormal; Notable for the following components:    Protein, Urine Random 20 (*)     All other components within normal limits    Narrative:     Specimen Source->Urine   HIV 1 / 2 ANTIBODY    Narrative:     Release to patient->Immediate   RPR          Imaging Results    None          Medications   NIFEdipine 24 hr tablet 60 mg (60 mg Oral Given 4/21/22 1144)                       Clinical Impression:   Final diagnoses:  [Z3A.29] 29 weeks gestation of pregnancy  [I10] Chronic hypertension (Primary)          ED Disposition Condition    Discharge Stable        ED Prescriptions     None        Follow-up Information    None     31 year old chronic hypertension with exacerbation  Mild to severe blood pressure ranges at home procardia increased to 60 mg in triage   Continued with elevated blood pressures additional 10 mg IR given  No clinical symptoms or labs changes  Will monitor blood pressure overnight  Cervix closed -not feeling contractions will send urine culture   Consents signed   Bid monitoring       Shara Rivera MD  04/21/22 [id number]

## 2022-04-21 NOTE — PROGRESS NOTES
Elevated BP in the evenings - several severe ranges.  Dizziness and headache yesterday.  Swelling today.  Reports fetal movement.  Denies contractions, leakage of fluid, vaginal bleeding.  Will send over to [facility] for further evaluation and management.  All questions answered.

## 2022-04-22 VITALS
OXYGEN SATURATION: 100 % | BODY MASS INDEX: 32.14 KG/M2 | HEART RATE: 98 BPM | SYSTOLIC BLOOD PRESSURE: 140 MMHG | DIASTOLIC BLOOD PRESSURE: 94 MMHG | TEMPERATURE: 99 F | WEIGHT: 174.63 LBS | RESPIRATION RATE: 18 BRPM | HEIGHT: 62 IN

## 2022-04-22 PROBLEM — I10 CHRONIC HYPERTENSION: Status: ACTIVE | Noted: 2022-04-22

## 2022-04-22 LAB
BACTERIA UR CULT: NO GROWTH
BACTERIA UR CULT: NORMAL
RPR SER QL: NORMAL

## 2022-04-22 PROCEDURE — 99217 PR OBSERVATION CARE DISCHARGE: CPT | Mod: ,,, | Performed by: OBSTETRICS & GYNECOLOGY

## 2022-04-22 PROCEDURE — 99217 PR OBSERVATION CARE DISCHARGE: ICD-10-PCS | Mod: ,,, | Performed by: OBSTETRICS & GYNECOLOGY

## 2022-04-22 PROCEDURE — G0378 HOSPITAL OBSERVATION PER HR: HCPCS

## 2022-04-22 PROCEDURE — 25000003 PHARM REV CODE 250: Performed by: OBSTETRICS & GYNECOLOGY

## 2022-04-22 RX ORDER — NIFEDIPINE 30 MG/1
90 TABLET, EXTENDED RELEASE ORAL DAILY
Status: DISCONTINUED | OUTPATIENT
Start: 2022-04-22 | End: 2022-04-22 | Stop reason: HOSPADM

## 2022-04-22 RX ORDER — NIFEDIPINE 90 MG/1
90 TABLET, EXTENDED RELEASE ORAL DAILY
Qty: 30 TABLET | Refills: 0 | Status: SHIPPED | OUTPATIENT
Start: 2022-04-23 | End: 2022-05-23 | Stop reason: SDUPTHER

## 2022-04-22 RX ADMIN — NIFEDIPINE 90 MG: 30 TABLET, FILM COATED, EXTENDED RELEASE ORAL at 08:04

## 2022-04-22 RX ADMIN — PRENATAL VIT W/ FE FUMARATE-FA TAB 27-0.8 MG 1 TABLET: 27-0.8 TAB at 08:04

## 2022-04-22 NOTE — PROGRESS NOTES
"Subjective:       Cherrie Wells is a 31 y.o.  at 29w1d with chronic hypertension exacerbation.  Procardia 30 XL started during the pregnancy.  Increased to 90 XL today secondary to increased Bps.  Patient is feeling better.  No HA, vision changes, CP, SOB, palpitations.  No dizziness.  Reports FM.   No contractions, LOF, VB.    The patient's allergies, and past medical and surgical histories were reviewed.    Review of Systems  Pertinent items are noted in HPI.      Objective:      /78 (BP Location: Left arm, Patient Position: Lying)   Pulse 102   Temp 98 °F (36.7 °C) (Oral)   Resp 18   Ht 5' 2" (1.575 m)   Wt 79.2 kg (174 lb 9.7 oz)   LMP 09/15/2021   SpO2 97%   BMI 31.94 kg/m²     APPEARANCE: Well nourished, well developed, in no acute distress.  PSYCH: Appropriate mood and affect.  NECK:  Supple, no thyromegaly.  ABD:  Gravid, nontender      Assessment:   32 yo  at 29w1d with CHTN       Plan:     Medication increased overnight.  PreE labs stable.  Now on procardia 90 XL daily.  Will continue to monitor closely.  Plan for DC this AM if Bps better controlled.  BP check in 1 week with me.  ED precautions reviewed.    "

## 2022-04-22 NOTE — PLAN OF CARE
VSS. No acute changes overnight. Pt denies headache, visual changes, epigastric pain, and SOB. Reports + fetal movement. Denies ctxn, LOF, and vaginal bleeding. Medication regimen updated to 90 mg Procardia daily, to begin at 0900. Plan of care reviewed with pt, questions answered. Call light In reach, safety maintained.

## 2022-04-22 NOTE — DISCHARGE SUMMARY
[facility]  Obstetrics  Discharge Summary      Patient Name: Cherrie Wells  MRN: [id number]  Admission Date: 2022  Hospital Length of Stay: 1 days  Discharge Date and Time:  2022 8:48 AM  Attending Physician: Shara Rivera MD   Discharging Provider: Saloni Voss MD   Primary Care Provider: Son MALLIKA MD [place]      Hospital Course:   32yo  presented to OB ED at 29w1d for BP monitoring.  She has a history of cHTN, currently on Procardia 60mg XL.  She was admitted to observation and BP monitored overnight.  Procardia increased to 90mg XL.  She remained asymptomatics, and preeclampsia labs stable.  By HD 2, her BP was better controlled on Procardia 90mg XL, and she was in stable condition for discharge home.  Strict return precautions given.  Close f/u with primary OB for BP check.        Final Active Diagnoses:    Diagnosis Date Noted POA    PRINCIPAL PROBLEM:  Chronic hypertension [I10] 2022 Yes      Problems Resolved During this Admission:        Significant Diagnostic Studies: Labs:   CMP   Recent Labs   Lab 22  1145      K 4.0      CO2 18*   *   BUN 9   CREATININE 0.5   CALCIUM 9.3   PROT 6.6   ALBUMIN 3.0*   BILITOT 0.3   ALKPHOS 111   AST 19   ALT 14   ANIONGAP 14   ESTGFRAFRICA >60   EGFRNONAA >60    and CBC   Recent Labs   Lab 22  1145   WBC 11.70   HGB 11.9*   HCT 35.5*            Immunizations     None          This patient has no babies on file.  Pending Diagnostic Studies:     Procedure Component Value Units Date/Time    RPR [[id number]] Collected: 22 1145    Order Status: Sent Lab Status: In process Updated: 22 1152    Specimen: Blood           Discharged Condition: stable    Disposition: Home or Self Care    Follow Up:   Follow-up Information     Orquidea Amaro MD Follow up in 1 week(s).    Specialty: Obstetrics and Gynecology  Why: BP check  Contact information:  [address]  [address]  [place]  [place] [address]  [phone]                       Patient Instructions:      Diet Adult Regular     Medications:  Current Discharge Medication List      START taking these medications    Details   !! NIFEdipine (PROCARDIA-XL) 90 MG (OSM) 24 hr tablet Take 1 tablet (90 mg total) by mouth once daily.  Qty: 30 tablet, Refills: 0    Comments: .       !! - Potential duplicate medications found. Please discuss with provider.      CONTINUE these medications which have NOT CHANGED    Details   aspirin 81 MG Chew Take 81 mg by mouth once daily.      EScitalopram oxalate (LEXAPRO) 20 MG tablet Take 20 mg by mouth once daily.      !! NIFEdipine (PROCARDIA-XL) 30 MG (OSM) 24 hr tablet Take 1 tablet (30 mg total) by mouth once daily.  Qty: 30 tablet, Refills: 2    Comments: .      prenatal 25/iron fum/folic/dha (PRENATAL-1 ORAL) Take by mouth.       !! - Potential duplicate medications found. Please discuss with provider.          Saloni Voss MD  Obstetrics  [facility])

## 2022-05-02 ENCOUNTER — ROUTINE PRENATAL (OUTPATIENT)
Dept: OBSTETRICS AND GYNECOLOGY | Facility: CLINIC | Age: 32
End: 2022-05-02
Attending: STUDENT IN AN ORGANIZED HEALTH CARE EDUCATION/TRAINING PROGRAM
Payer: COMMERCIAL

## 2022-05-02 VITALS
BODY MASS INDEX: 33.06 KG/M2 | SYSTOLIC BLOOD PRESSURE: 156 MMHG | DIASTOLIC BLOOD PRESSURE: 98 MMHG | WEIGHT: 180.75 LBS

## 2022-05-02 DIAGNOSIS — O10.919 CHRONIC HYPERTENSION AFFECTING PREGNANCY: Primary | ICD-10-CM

## 2022-05-02 PROCEDURE — 99999 PR PBB SHADOW E&M-EST. PATIENT-LVL III: CPT | Mod: PBBFAC,,, | Performed by: STUDENT IN AN ORGANIZED HEALTH CARE EDUCATION/TRAINING PROGRAM

## 2022-05-02 PROCEDURE — 0502F PR SUBSEQUENT PRENATAL CARE: ICD-10-PCS | Mod: CPTII,S$GLB,, | Performed by: STUDENT IN AN ORGANIZED HEALTH CARE EDUCATION/TRAINING PROGRAM

## 2022-05-02 PROCEDURE — 0502F SUBSEQUENT PRENATAL CARE: CPT | Mod: CPTII,S$GLB,, | Performed by: STUDENT IN AN ORGANIZED HEALTH CARE EDUCATION/TRAINING PROGRAM

## 2022-05-02 PROCEDURE — 84156 ASSAY OF PROTEIN URINE: CPT | Performed by: STUDENT IN AN ORGANIZED HEALTH CARE EDUCATION/TRAINING PROGRAM

## 2022-05-02 PROCEDURE — 99999 PR PBB SHADOW E&M-EST. PATIENT-LVL III: ICD-10-PCS | Mod: PBBFAC,,, | Performed by: STUDENT IN AN ORGANIZED HEALTH CARE EDUCATION/TRAINING PROGRAM

## 2022-05-02 RX ORDER — LABETALOL 200 MG/1
200 TABLET, FILM COATED ORAL 2 TIMES DAILY
Qty: 60 TABLET | Refills: 11 | Status: ON HOLD | OUTPATIENT
Start: 2022-05-02 | End: 2022-06-08 | Stop reason: HOSPADM

## 2022-05-02 NOTE — PROGRESS NOTES
Doing well.  No complaints.  Reports fetal movement.  Denies contractions, leakage of fluid, vaginal bleeding.  Home Bps reviewed ranging from mild to severe range.  Taking procardia 90 daily.  Asymptomatic today.  Discussed with MFM and appreciate recommendations.  Will add Labetalol 200 mg BID.  Labs and 24 hour urine protein ordered.  Will follow up results.  ED precautions reviewed.  RTC this week for BP check or sooner if needed.

## 2022-05-03 ENCOUNTER — LAB VISIT (OUTPATIENT)
Dept: LAB | Facility: HOSPITAL | Age: 32
End: 2022-05-03
Attending: STUDENT IN AN ORGANIZED HEALTH CARE EDUCATION/TRAINING PROGRAM
Payer: COMMERCIAL

## 2022-05-03 ENCOUNTER — PATIENT MESSAGE (OUTPATIENT)
Dept: OBSTETRICS AND GYNECOLOGY | Facility: CLINIC | Age: 32
End: 2022-05-03
Payer: COMMERCIAL

## 2022-05-03 DIAGNOSIS — Z3A.26 26 WEEKS GESTATION OF PREGNANCY: ICD-10-CM

## 2022-05-03 LAB
ALBUMIN SERPL BCP-MCNC: 2.7 G/DL (ref 3.5–5.2)
ALP SERPL-CCNC: 123 U/L (ref 55–135)
ALT SERPL W/O P-5'-P-CCNC: 7 U/L (ref 10–44)
ANION GAP SERPL CALC-SCNC: 11 MMOL/L (ref 8–16)
AST SERPL-CCNC: 12 U/L (ref 10–40)
BASOPHILS # BLD AUTO: 0.02 K/UL (ref 0–0.2)
BASOPHILS NFR BLD: 0.2 % (ref 0–1.9)
BILIRUB SERPL-MCNC: 0.3 MG/DL (ref 0.1–1)
BUN SERPL-MCNC: 6 MG/DL (ref 6–20)
CALCIUM SERPL-MCNC: 9 MG/DL (ref 8.7–10.5)
CHLORIDE SERPL-SCNC: 106 MMOL/L (ref 95–110)
CO2 SERPL-SCNC: 20 MMOL/L (ref 23–29)
CREAT SERPL-MCNC: 0.6 MG/DL (ref 0.5–1.4)
CREAT UR-MCNC: 169 MG/DL (ref 15–325)
DIFFERENTIAL METHOD: ABNORMAL
EOSINOPHIL # BLD AUTO: 0.1 K/UL (ref 0–0.5)
EOSINOPHIL NFR BLD: 0.9 % (ref 0–8)
ERYTHROCYTE [DISTWIDTH] IN BLOOD BY AUTOMATED COUNT: 13 % (ref 11.5–14.5)
EST. GFR  (AFRICAN AMERICAN): >60 ML/MIN/1.73 M^2
EST. GFR  (NON AFRICAN AMERICAN): >60 ML/MIN/1.73 M^2
GLUCOSE SERPL-MCNC: 203 MG/DL (ref 70–140)
GLUCOSE SERPL-MCNC: 204 MG/DL (ref 70–110)
HCT VFR BLD AUTO: 34.5 % (ref 37–48.5)
HGB BLD-MCNC: 11.2 G/DL (ref 12–16)
IMM GRANULOCYTES # BLD AUTO: 0.1 K/UL (ref 0–0.04)
IMM GRANULOCYTES NFR BLD AUTO: 1 % (ref 0–0.5)
LYMPHOCYTES # BLD AUTO: 1.9 K/UL (ref 1–4.8)
LYMPHOCYTES NFR BLD: 18.1 % (ref 18–48)
MCH RBC QN AUTO: 30.4 PG (ref 27–31)
MCHC RBC AUTO-ENTMCNC: 32.5 G/DL (ref 32–36)
MCV RBC AUTO: 94 FL (ref 82–98)
MONOCYTES # BLD AUTO: 0.4 K/UL (ref 0.3–1)
MONOCYTES NFR BLD: 4.3 % (ref 4–15)
NEUTROPHILS # BLD AUTO: 7.8 K/UL (ref 1.8–7.7)
NEUTROPHILS NFR BLD: 75.5 % (ref 38–73)
NRBC BLD-RTO: 0 /100 WBC
PLATELET # BLD AUTO: 232 K/UL (ref 150–450)
PMV BLD AUTO: 10.6 FL (ref 9.2–12.9)
POTASSIUM SERPL-SCNC: 3.5 MMOL/L (ref 3.5–5.1)
PROT SERPL-MCNC: 6.2 G/DL (ref 6–8.4)
PROT UR-MCNC: 36 MG/DL (ref 0–15)
PROT/CREAT UR: 0.21 MG/G{CREAT} (ref 0–0.2)
RBC # BLD AUTO: 3.68 M/UL (ref 4–5.4)
SODIUM SERPL-SCNC: 137 MMOL/L (ref 136–145)
WBC # BLD AUTO: 10.31 K/UL (ref 3.9–12.7)

## 2022-05-03 PROCEDURE — 80053 COMPREHEN METABOLIC PANEL: CPT | Performed by: STUDENT IN AN ORGANIZED HEALTH CARE EDUCATION/TRAINING PROGRAM

## 2022-05-03 PROCEDURE — 82950 GLUCOSE TEST: CPT | Performed by: STUDENT IN AN ORGANIZED HEALTH CARE EDUCATION/TRAINING PROGRAM

## 2022-05-03 PROCEDURE — 85025 COMPLETE CBC W/AUTO DIFF WBC: CPT | Performed by: STUDENT IN AN ORGANIZED HEALTH CARE EDUCATION/TRAINING PROGRAM

## 2022-05-04 ENCOUNTER — PATIENT MESSAGE (OUTPATIENT)
Dept: OBSTETRICS AND GYNECOLOGY | Facility: CLINIC | Age: 32
End: 2022-05-04
Payer: COMMERCIAL

## 2022-05-05 ENCOUNTER — CLINICAL SUPPORT (OUTPATIENT)
Dept: OBSTETRICS AND GYNECOLOGY | Facility: CLINIC | Age: 32
End: 2022-05-05
Attending: STUDENT IN AN ORGANIZED HEALTH CARE EDUCATION/TRAINING PROGRAM
Payer: COMMERCIAL

## 2022-05-05 ENCOUNTER — TELEPHONE (OUTPATIENT)
Dept: OBSTETRICS AND GYNECOLOGY | Facility: CLINIC | Age: 32
End: 2022-05-05
Payer: COMMERCIAL

## 2022-05-05 VITALS
BODY MASS INDEX: 32.98 KG/M2 | DIASTOLIC BLOOD PRESSURE: 100 MMHG | WEIGHT: 180.31 LBS | SYSTOLIC BLOOD PRESSURE: 138 MMHG

## 2022-05-05 DIAGNOSIS — O24.419 GESTATIONAL DIABETES MELLITUS (GDM) IN THIRD TRIMESTER, GESTATIONAL DIABETES METHOD OF CONTROL UNSPECIFIED: Primary | ICD-10-CM

## 2022-05-05 DIAGNOSIS — O24.419 GESTATIONAL DIABETES MELLITUS (GDM) IN THIRD TRIMESTER, GESTATIONAL DIABETES METHOD OF CONTROL UNSPECIFIED: ICD-10-CM

## 2022-05-05 DIAGNOSIS — Z23 NEED FOR DIPHTHERIA-TETANUS-PERTUSSIS (TDAP) VACCINE: Primary | ICD-10-CM

## 2022-05-05 DIAGNOSIS — O10.919 CHRONIC HYPERTENSION AFFECTING PREGNANCY: Primary | ICD-10-CM

## 2022-05-05 PROCEDURE — 99999 PR PBB SHADOW E&M-EST. PATIENT-LVL III: CPT | Mod: PBBFAC,,, | Performed by: STUDENT IN AN ORGANIZED HEALTH CARE EDUCATION/TRAINING PROGRAM

## 2022-05-05 PROCEDURE — 99999 PR PBB SHADOW E&M-EST. PATIENT-LVL II: CPT | Mod: PBBFAC,,,

## 2022-05-05 PROCEDURE — 99999 PR PBB SHADOW E&M-EST. PATIENT-LVL III: ICD-10-PCS | Mod: PBBFAC,,, | Performed by: STUDENT IN AN ORGANIZED HEALTH CARE EDUCATION/TRAINING PROGRAM

## 2022-05-05 PROCEDURE — 90715 TDAP VACCINE 7 YRS/> IM: CPT | Mod: S$GLB,,, | Performed by: STUDENT IN AN ORGANIZED HEALTH CARE EDUCATION/TRAINING PROGRAM

## 2022-05-05 PROCEDURE — 90471 TDAP VACCINE GREATER THAN OR EQUAL TO 7YO IM: ICD-10-PCS | Mod: S$GLB,,, | Performed by: STUDENT IN AN ORGANIZED HEALTH CARE EDUCATION/TRAINING PROGRAM

## 2022-05-05 PROCEDURE — 99999 PR PBB SHADOW E&M-EST. PATIENT-LVL II: ICD-10-PCS | Mod: PBBFAC,,,

## 2022-05-05 PROCEDURE — 0502F PR SUBSEQUENT PRENATAL CARE: ICD-10-PCS | Mod: CPTII,S$GLB,, | Performed by: STUDENT IN AN ORGANIZED HEALTH CARE EDUCATION/TRAINING PROGRAM

## 2022-05-05 PROCEDURE — 90715 TDAP VACCINE GREATER THAN OR EQUAL TO 7YO IM: ICD-10-PCS | Mod: S$GLB,,, | Performed by: STUDENT IN AN ORGANIZED HEALTH CARE EDUCATION/TRAINING PROGRAM

## 2022-05-05 PROCEDURE — 0502F SUBSEQUENT PRENATAL CARE: CPT | Mod: CPTII,S$GLB,, | Performed by: STUDENT IN AN ORGANIZED HEALTH CARE EDUCATION/TRAINING PROGRAM

## 2022-05-05 PROCEDURE — 90471 IMMUNIZATION ADMIN: CPT | Mod: S$GLB,,, | Performed by: STUDENT IN AN ORGANIZED HEALTH CARE EDUCATION/TRAINING PROGRAM

## 2022-05-05 NOTE — PROGRESS NOTES
.5/5/22 Pt provided Tdap VIS,  Pt administered Tdap to the left  Deltoid. Pt tolerated well. Pt instructed to wait 15 minutes in the waiting room following injection in case of reaction. Pt verbalizes understanding.     Ordering Provider:Dr Amaro    Pain before: None    Pain after: None     Patient complaints:none

## 2022-05-06 RX ORDER — LANCETS
1 EACH MISCELLANEOUS EVERY 6 HOURS
Qty: 120 EACH | Refills: 3 | Status: SHIPPED | OUTPATIENT
Start: 2022-05-06

## 2022-05-06 RX ORDER — INSULIN PUMP SYRINGE, 3 ML
EACH MISCELLANEOUS
Qty: 1 EACH | Refills: 0 | Status: SHIPPED | OUTPATIENT
Start: 2022-05-06 | End: 2023-05-06

## 2022-05-06 NOTE — TELEPHONE ENCOUNTER
Pt has gestation DM, needs to check CBG 4 times a day.  The machine has not been sent in yet.    Dr. Amaro, I am not sure how to pend that to you    Queenie, can you fix her appt on 5/10?  She is having an US at 3:30 but is shceduled at 11:30 for the appt with Dr. Amaro.  Can you move her visit to after US?

## 2022-05-10 ENCOUNTER — ROUTINE PRENATAL (OUTPATIENT)
Dept: OBSTETRICS AND GYNECOLOGY | Facility: CLINIC | Age: 32
End: 2022-05-10
Attending: STUDENT IN AN ORGANIZED HEALTH CARE EDUCATION/TRAINING PROGRAM
Payer: COMMERCIAL

## 2022-05-10 ENCOUNTER — LAB VISIT (OUTPATIENT)
Dept: LAB | Facility: OTHER | Age: 32
End: 2022-05-10
Attending: STUDENT IN AN ORGANIZED HEALTH CARE EDUCATION/TRAINING PROGRAM
Payer: COMMERCIAL

## 2022-05-10 VITALS
WEIGHT: 183.63 LBS | DIASTOLIC BLOOD PRESSURE: 102 MMHG | SYSTOLIC BLOOD PRESSURE: 142 MMHG | BODY MASS INDEX: 33.59 KG/M2

## 2022-05-10 DIAGNOSIS — O16.3 HYPERTENSION AFFECTING PREGNANCY IN THIRD TRIMESTER: Primary | ICD-10-CM

## 2022-05-10 DIAGNOSIS — O24.419 GESTATIONAL DIABETES MELLITUS (GDM) IN THIRD TRIMESTER, GESTATIONAL DIABETES METHOD OF CONTROL UNSPECIFIED: ICD-10-CM

## 2022-05-10 DIAGNOSIS — O16.3 HYPERTENSION AFFECTING PREGNANCY IN THIRD TRIMESTER: ICD-10-CM

## 2022-05-10 DIAGNOSIS — O10.919 CHRONIC HYPERTENSION AFFECTING PREGNANCY: ICD-10-CM

## 2022-05-10 LAB
ALBUMIN SERPL BCP-MCNC: 2.8 G/DL (ref 3.5–5.2)
ALP SERPL-CCNC: 142 U/L (ref 55–135)
ALT SERPL W/O P-5'-P-CCNC: 10 U/L (ref 10–44)
ANION GAP SERPL CALC-SCNC: 13 MMOL/L (ref 8–16)
AST SERPL-CCNC: 16 U/L (ref 10–40)
BASOPHILS # BLD AUTO: 0.02 K/UL (ref 0–0.2)
BASOPHILS NFR BLD: 0.2 % (ref 0–1.9)
BILIRUB SERPL-MCNC: 0.4 MG/DL (ref 0.1–1)
BUN SERPL-MCNC: 13 MG/DL (ref 6–20)
CALCIUM SERPL-MCNC: 9.7 MG/DL (ref 8.7–10.5)
CHLORIDE SERPL-SCNC: 106 MMOL/L (ref 95–110)
CO2 SERPL-SCNC: 19 MMOL/L (ref 23–29)
CREAT SERPL-MCNC: 0.6 MG/DL (ref 0.5–1.4)
DIFFERENTIAL METHOD: ABNORMAL
EOSINOPHIL # BLD AUTO: 0.2 K/UL (ref 0–0.5)
EOSINOPHIL NFR BLD: 1.6 % (ref 0–8)
ERYTHROCYTE [DISTWIDTH] IN BLOOD BY AUTOMATED COUNT: 12.8 % (ref 11.5–14.5)
EST. GFR  (AFRICAN AMERICAN): >60 ML/MIN/1.73 M^2
EST. GFR  (NON AFRICAN AMERICAN): >60 ML/MIN/1.73 M^2
GLUCOSE SERPL-MCNC: 119 MG/DL (ref 70–110)
HCT VFR BLD AUTO: 33.5 % (ref 37–48.5)
HGB BLD-MCNC: 11.5 G/DL (ref 12–16)
IMM GRANULOCYTES # BLD AUTO: 0.1 K/UL (ref 0–0.04)
IMM GRANULOCYTES NFR BLD AUTO: 1 % (ref 0–0.5)
LYMPHOCYTES # BLD AUTO: 1.8 K/UL (ref 1–4.8)
LYMPHOCYTES NFR BLD: 17.4 % (ref 18–48)
MCH RBC QN AUTO: 31.9 PG (ref 27–31)
MCHC RBC AUTO-ENTMCNC: 34.3 G/DL (ref 32–36)
MCV RBC AUTO: 93 FL (ref 82–98)
MONOCYTES # BLD AUTO: 0.6 K/UL (ref 0.3–1)
MONOCYTES NFR BLD: 5.9 % (ref 4–15)
NEUTROPHILS # BLD AUTO: 7.6 K/UL (ref 1.8–7.7)
NEUTROPHILS NFR BLD: 73.9 % (ref 38–73)
NRBC BLD-RTO: 0 /100 WBC
PLATELET # BLD AUTO: 254 K/UL (ref 150–450)
PMV BLD AUTO: 11 FL (ref 9.2–12.9)
POTASSIUM SERPL-SCNC: 4.2 MMOL/L (ref 3.5–5.1)
PROT SERPL-MCNC: 5.9 G/DL (ref 6–8.4)
RBC # BLD AUTO: 3.61 M/UL (ref 4–5.4)
SODIUM SERPL-SCNC: 138 MMOL/L (ref 136–145)
WBC # BLD AUTO: 10.2 K/UL (ref 3.9–12.7)

## 2022-05-10 PROCEDURE — 76817 TRANSVAGINAL US OBSTETRIC: CPT | Mod: S$GLB,,, | Performed by: OBSTETRICS & GYNECOLOGY

## 2022-05-10 PROCEDURE — 76816 OB US FOLLOW-UP PER FETUS: CPT | Mod: S$GLB,,, | Performed by: OBSTETRICS & GYNECOLOGY

## 2022-05-10 PROCEDURE — 36415 COLL VENOUS BLD VENIPUNCTURE: CPT | Performed by: STUDENT IN AN ORGANIZED HEALTH CARE EDUCATION/TRAINING PROGRAM

## 2022-05-10 PROCEDURE — 76816 US OB/GYN EXTENDED PROCEDURE (VIEWPOINT): ICD-10-PCS | Mod: S$GLB,,, | Performed by: OBSTETRICS & GYNECOLOGY

## 2022-05-10 PROCEDURE — 99999 PR PBB SHADOW E&M-EST. PATIENT-LVL II: ICD-10-PCS | Mod: PBBFAC,,, | Performed by: STUDENT IN AN ORGANIZED HEALTH CARE EDUCATION/TRAINING PROGRAM

## 2022-05-10 PROCEDURE — 0502F PR SUBSEQUENT PRENATAL CARE: ICD-10-PCS | Mod: CPTII,S$GLB,, | Performed by: STUDENT IN AN ORGANIZED HEALTH CARE EDUCATION/TRAINING PROGRAM

## 2022-05-10 PROCEDURE — 76817 US OB/GYN EXTENDED PROCEDURE (VIEWPOINT): ICD-10-PCS | Mod: S$GLB,,, | Performed by: OBSTETRICS & GYNECOLOGY

## 2022-05-10 PROCEDURE — 80053 COMPREHEN METABOLIC PANEL: CPT | Performed by: STUDENT IN AN ORGANIZED HEALTH CARE EDUCATION/TRAINING PROGRAM

## 2022-05-10 PROCEDURE — 0502F SUBSEQUENT PRENATAL CARE: CPT | Mod: CPTII,S$GLB,, | Performed by: STUDENT IN AN ORGANIZED HEALTH CARE EDUCATION/TRAINING PROGRAM

## 2022-05-10 PROCEDURE — 85025 COMPLETE CBC W/AUTO DIFF WBC: CPT | Performed by: STUDENT IN AN ORGANIZED HEALTH CARE EDUCATION/TRAINING PROGRAM

## 2022-05-10 PROCEDURE — 99999 PR PBB SHADOW E&M-EST. PATIENT-LVL II: CPT | Mod: PBBFAC,,, | Performed by: STUDENT IN AN ORGANIZED HEALTH CARE EDUCATION/TRAINING PROGRAM

## 2022-05-11 ENCOUNTER — PATIENT MESSAGE (OUTPATIENT)
Dept: MATERNAL FETAL MEDICINE | Facility: CLINIC | Age: 32
End: 2022-05-11
Payer: COMMERCIAL

## 2022-05-11 ENCOUNTER — TELEPHONE (OUTPATIENT)
Dept: OBSTETRICS AND GYNECOLOGY | Facility: CLINIC | Age: 32
End: 2022-05-11
Payer: COMMERCIAL

## 2022-05-11 NOTE — TELEPHONE ENCOUNTER
----- Message from Orquidea Amaro MD sent at 5/11/2022  1:52 AM CDT -----  Regarding: Additional testing  Can we please set her up for a NST on Friday with perinatology?  Thank you!!!      Spoke with patient regarding getting patient schedule for Friday. Patient states she can't do this Friday because of moving. Patient would like to do it on the 18th since she has a visit with Dr Hale already. Inform patient I will check with Dr Hale on tomorrow and give patient a call back tomorrow. Patient verbalized and understand

## 2022-05-11 NOTE — PROGRESS NOTES
Doing well.  No complaints.  Reports fetal movement.  Denies contractions, leakage of fluid, vaginal bleeding.  BP on procardia and labetalol - will start twice weekly testing.  Has glucometer and will start glucose testing this week.  Will also set up with MFM follow up.  Asymptomatic.  Labs today.  All questions answered.  RTC for NST or sooner if needed.

## 2022-05-12 ENCOUNTER — HOSPITAL ENCOUNTER (OUTPATIENT)
Dept: PERINATAL CARE | Facility: OTHER | Age: 32
Discharge: HOME | End: 2022-05-12
Attending: STUDENT IN AN ORGANIZED HEALTH CARE EDUCATION/TRAINING PROGRAM
Payer: COMMERCIAL

## 2022-05-12 ENCOUNTER — PATIENT MESSAGE (OUTPATIENT)
Dept: OBSTETRICS AND GYNECOLOGY | Facility: CLINIC | Age: 32
End: 2022-05-12
Payer: COMMERCIAL

## 2022-05-12 ENCOUNTER — TELEPHONE (OUTPATIENT)
Dept: OBSTETRICS AND GYNECOLOGY | Facility: CLINIC | Age: 32
End: 2022-05-12
Payer: COMMERCIAL

## 2022-05-12 DIAGNOSIS — O10.919 CHRONIC HYPERTENSION AFFECTING PREGNANCY: Primary | ICD-10-CM

## 2022-05-12 DIAGNOSIS — O10.919 CHRONIC HYPERTENSION AFFECTING PREGNANCY: ICD-10-CM

## 2022-05-12 PROCEDURE — 76818 FETAL BIOPHYS PROFILE W/NST: CPT | Mod: 26,,, | Performed by: OBSTETRICS & GYNECOLOGY

## 2022-05-12 PROCEDURE — 76818 PRENATAL TESTING - NST/AFI: ICD-10-PCS | Mod: 26,,, | Performed by: OBSTETRICS & GYNECOLOGY

## 2022-05-12 PROCEDURE — 76818 FETAL BIOPHYS PROFILE W/NST: CPT

## 2022-05-12 NOTE — TELEPHONE ENCOUNTER
----- Message from Orquidea Amaro MD sent at 5/12/2022  1:58 PM CDT -----  Regarding: Friday NST  Can we please set her up for a NST tomorrow?  It can be with us in [place] or with perinatology.  Thank you!!

## 2022-05-16 ENCOUNTER — TELEPHONE (OUTPATIENT)
Dept: OBSTETRICS AND GYNECOLOGY | Facility: CLINIC | Age: 32
End: 2022-05-16
Payer: COMMERCIAL

## 2022-05-16 ENCOUNTER — HOSPITAL ENCOUNTER (OUTPATIENT)
Dept: PERINATAL CARE | Facility: OTHER | Age: 32
Discharge: HOME | End: 2022-05-16
Attending: STUDENT IN AN ORGANIZED HEALTH CARE EDUCATION/TRAINING PROGRAM
Payer: COMMERCIAL

## 2022-05-16 ENCOUNTER — HOSPITAL ENCOUNTER (EMERGENCY)
Facility: OTHER | Age: 32
Discharge: HOME | End: 2022-05-16
Attending: OBSTETRICS & GYNECOLOGY
Payer: COMMERCIAL

## 2022-05-16 VITALS
WEIGHT: 183.63 LBS | DIASTOLIC BLOOD PRESSURE: 88 MMHG | HEART RATE: 86 BPM | SYSTOLIC BLOOD PRESSURE: 139 MMHG | TEMPERATURE: 98 F | BODY MASS INDEX: 33.79 KG/M2 | OXYGEN SATURATION: 96 % | RESPIRATION RATE: 16 BRPM | HEIGHT: 62 IN

## 2022-05-16 DIAGNOSIS — I10 CHRONIC HYPERTENSION: Primary | ICD-10-CM

## 2022-05-16 DIAGNOSIS — O10.919 CHRONIC HYPERTENSION AFFECTING PREGNANCY: ICD-10-CM

## 2022-05-16 DIAGNOSIS — O47.9 IRREGULAR UTERINE CONTRACTIONS: ICD-10-CM

## 2022-05-16 DIAGNOSIS — Z3A.32 32 WEEKS GESTATION OF PREGNANCY: ICD-10-CM

## 2022-05-16 LAB
ALBUMIN SERPL BCP-MCNC: 2.6 G/DL (ref 3.5–5.2)
ALP SERPL-CCNC: 151 U/L (ref 55–135)
ALT SERPL W/O P-5'-P-CCNC: 14 U/L (ref 10–44)
ANION GAP SERPL CALC-SCNC: 13 MMOL/L (ref 8–16)
AST SERPL-CCNC: 17 U/L (ref 10–40)
BASOPHILS # BLD AUTO: 0.01 K/UL (ref 0–0.2)
BASOPHILS NFR BLD: 0.1 % (ref 0–1.9)
BILIRUB SERPL-MCNC: 0.2 MG/DL (ref 0.1–1)
BUN SERPL-MCNC: 9 MG/DL (ref 6–20)
CALCIUM SERPL-MCNC: 9.2 MG/DL (ref 8.7–10.5)
CHLORIDE SERPL-SCNC: 106 MMOL/L (ref 95–110)
CO2 SERPL-SCNC: 19 MMOL/L (ref 23–29)
CREAT SERPL-MCNC: 0.6 MG/DL (ref 0.5–1.4)
CREAT UR-MCNC: 30.1 MG/DL (ref 15–325)
DIFFERENTIAL METHOD: ABNORMAL
EOSINOPHIL # BLD AUTO: 0.2 K/UL (ref 0–0.5)
EOSINOPHIL NFR BLD: 1.6 % (ref 0–8)
ERYTHROCYTE [DISTWIDTH] IN BLOOD BY AUTOMATED COUNT: 12.8 % (ref 11.5–14.5)
EST. GFR  (AFRICAN AMERICAN): >60 ML/MIN/1.73 M^2
EST. GFR  (NON AFRICAN AMERICAN): >60 ML/MIN/1.73 M^2
GLUCOSE SERPL-MCNC: 124 MG/DL (ref 70–110)
HCT VFR BLD AUTO: 33.7 % (ref 37–48.5)
HGB BLD-MCNC: 11.4 G/DL (ref 12–16)
IMM GRANULOCYTES # BLD AUTO: 0.1 K/UL (ref 0–0.04)
IMM GRANULOCYTES NFR BLD AUTO: 1 % (ref 0–0.5)
LYMPHOCYTES # BLD AUTO: 2.2 K/UL (ref 1–4.8)
LYMPHOCYTES NFR BLD: 22.6 % (ref 18–48)
MCH RBC QN AUTO: 31.8 PG (ref 27–31)
MCHC RBC AUTO-ENTMCNC: 33.8 G/DL (ref 32–36)
MCV RBC AUTO: 94 FL (ref 82–98)
MONOCYTES # BLD AUTO: 0.6 K/UL (ref 0.3–1)
MONOCYTES NFR BLD: 6 % (ref 4–15)
NEUTROPHILS # BLD AUTO: 6.6 K/UL (ref 1.8–7.7)
NEUTROPHILS NFR BLD: 68.7 % (ref 38–73)
NRBC BLD-RTO: 0 /100 WBC
PLATELET # BLD AUTO: 228 K/UL (ref 150–450)
PMV BLD AUTO: 10.6 FL (ref 9.2–12.9)
POTASSIUM SERPL-SCNC: 3.9 MMOL/L (ref 3.5–5.1)
PROT SERPL-MCNC: 6 G/DL (ref 6–8.4)
PROT UR-MCNC: <7 MG/DL (ref 0–15)
PROT/CREAT UR: NORMAL MG/G{CREAT} (ref 0–0.2)
RBC # BLD AUTO: 3.59 M/UL (ref 4–5.4)
SODIUM SERPL-SCNC: 138 MMOL/L (ref 136–145)
WBC # BLD AUTO: 9.67 K/UL (ref 3.9–12.7)

## 2022-05-16 PROCEDURE — 76815 OB US LIMITED FETUS(S): CPT

## 2022-05-16 PROCEDURE — 99283 EMERGENCY DEPT VISIT LOW MDM: CPT | Mod: 25,,, | Performed by: OBSTETRICS & GYNECOLOGY

## 2022-05-16 PROCEDURE — 99283 PR EMERGENCY DEPT VISIT,LEVEL III: ICD-10-PCS | Mod: 25,,, | Performed by: OBSTETRICS & GYNECOLOGY

## 2022-05-16 PROCEDURE — 76815 OB US LIMITED FETUS(S): CPT | Mod: 26,,, | Performed by: OBSTETRICS & GYNECOLOGY

## 2022-05-16 PROCEDURE — 59025 FETAL NON-STRESS TEST: CPT

## 2022-05-16 PROCEDURE — 85025 COMPLETE CBC W/AUTO DIFF WBC: CPT

## 2022-05-16 PROCEDURE — 59025 PRENATAL TESTING - NST/AFI: ICD-10-PCS | Mod: 26,77,, | Performed by: OBSTETRICS & GYNECOLOGY

## 2022-05-16 PROCEDURE — 80053 COMPREHEN METABOLIC PANEL: CPT

## 2022-05-16 PROCEDURE — 59025 FETAL NON-STRESS TEST: CPT | Mod: 76

## 2022-05-16 PROCEDURE — 59025 FETAL NON-STRESS TEST: CPT | Mod: 26,77,, | Performed by: OBSTETRICS & GYNECOLOGY

## 2022-05-16 PROCEDURE — 59025 PR FETAL 2N-STRESS TEST: ICD-10-PCS | Mod: 26,,, | Performed by: OBSTETRICS & GYNECOLOGY

## 2022-05-16 PROCEDURE — 99284 EMERGENCY DEPT VISIT MOD MDM: CPT | Mod: 25

## 2022-05-16 PROCEDURE — 59025 FETAL NON-STRESS TEST: CPT | Mod: 26,,, | Performed by: OBSTETRICS & GYNECOLOGY

## 2022-05-16 PROCEDURE — 84156 ASSAY OF PROTEIN URINE: CPT

## 2022-05-16 PROCEDURE — 76815 PRENATAL TESTING - NST/AFI: ICD-10-PCS | Mod: 26,,, | Performed by: OBSTETRICS & GYNECOLOGY

## 2022-05-16 NOTE — PROGRESS NOTES
Doing well.  No complaints.  Reports fetal movement.  Denies contractions, leakage of fluid, vaginal bleeding.  All questions answered.  Continue twice weekly testing.  RTC for testing or sooner if needed.

## 2022-05-16 NOTE — ED PROVIDER NOTES
Encounter Date: 2022       History     Chief Complaint   Patient presents with    Hypertension     Cherrie Wells is a 32 y.o.  at 23w4d gestation presenting for concerns of elevated blood pressure. She was in PNT and had multiple elevated BP, including 1 severe range BP, and was asked to present to the [facility] for further evaluation. Denies HA, vision changes, CP, SOB, RUQ pain. Denies contractions, vaginal bleeding, or loss of fluid. Reports normal fetal movement. This IUP is complicated by h/o opioid abuse (weaned off suboxone), cHTN (procardia 90XL, labetalol 200 BID), low lying placenta.         Review of patient's allergies indicates:  No Known Allergies  Past Medical History:   Diagnosis Date    Hypertension     Substance abuse      Past Surgical History:   Procedure Laterality Date    ANKLE SURGERY Left 2003    TONSILLECTOMY       Family History   Problem Relation Age of Onset    Breast cancer Paternal Grandmother     Diabetes Father     Hypertension Father     Colon cancer Neg Hx     Ovarian cancer Neg Hx      Social History     Tobacco Use    Smoking status: Former Smoker    Smokeless tobacco: Never Used   Substance Use Topics    Alcohol use: Not Currently     Comment: social    Drug use: No     Review of Systems   Constitutional: Negative for chills and fever.   HENT: Negative for congestion and sore throat.    Eyes: Negative for visual disturbance.   Respiratory: Negative for cough and shortness of breath.    Cardiovascular: Negative for chest pain.   Gastrointestinal: Negative for abdominal pain, constipation, diarrhea, nausea and vomiting.   Genitourinary: Negative for dysuria, vaginal bleeding and vaginal discharge.   Musculoskeletal: Negative for back pain.   Skin: Negative for rash.   Neurological: Negative for weakness and headaches.   Hematological: Does not bruise/bleed easily.       Physical Exam     Initial Vitals   BP Pulse Resp Temp SpO2   22 1811 22 1811  05/16/22 1807 05/16/22 1807 05/16/22 1909   (!) 133/97 88 16 98 °F (36.7 °C) 96 %      MAP       --                Physical Exam    Constitutional: She appears well-developed and well-nourished. No distress.   HENT:   Head: Normocephalic and atraumatic.   Eyes: EOM are normal.   Neck:   Normal range of motion.  Cardiovascular: Normal rate.   Pulmonary/Chest: No respiratory distress.   Abdominal: There is no abdominal tenderness. There is no rebound and no guarding.   Musculoskeletal:         General: No edema.      Cervical back: Normal range of motion.     Neurological: She is alert and oriented to person, place, and time.   Skin: Skin is warm and dry.   Psychiatric: She has a normal mood and affect. Thought content normal.     OB LABOR EXAM:   Pre-Term Labor: No.   Membranes ruptured: No.   Method: Sterile vaginal exam per MD.   Vaginal Bleeding: none present.     Dilatation: 0.   Station: -3.   Effacement: 50%.   Amniotic Fluid Color: no fluid.   Amniotic Fluid Amount: none noted.         ED Course   Obtain Fetal nonstress test (NST)    Date/Time: 5/16/2022 8:05 PM  Performed by: Emma Padron MD  Authorized by: Emma Padron MD     Nonstress Test:     Variability:  6-25 BPM    Decelerations:  None    Accelerations:  15 bpm    Acoustic Stimulator: No      Baseline:  135    Uterine Irritability: No      Contractions:  Not present  Biophysical Profile:     Nonstress Test Interpretation: reactive      Overall Impression:  Reassuring  Post-procedure:     Patient tolerance:  Patient tolerated the procedure well with no immediate complications      Labs Reviewed   CBC W/ AUTO DIFFERENTIAL - Abnormal; Notable for the following components:       Result Value    RBC 3.59 (*)     Hemoglobin 11.4 (*)     Hematocrit 33.7 (*)     MCH 31.8 (*)     Immature Granulocytes 1.0 (*)     Immature Grans (Abs) 0.10 (*)     All other components within normal limits   COMPREHENSIVE METABOLIC PANEL - Abnormal; Notable for the following  components:    CO2 19 (*)     Glucose 124 (*)     Albumin 2.6 (*)     Alkaline Phosphatase 151 (*)     All other components within normal limits   PROTEIN / CREATININE RATIO, URINE    Narrative:     Specimen Source->Urine          Imaging Results    None          Medications - No data to display  Medical Decision Making:   ED Management:  - Afebrile  - Initial /94, repeats all normal to mild range   - CBC 9/11/33/228  - Cr 0.6, AST/ALT 17/14  - P:C TLTC   - NST RR  - TOCO: irregular contractions in PNT patient was not feeling, none in [facility]   - SVE: cl/th/hi  - Patient stable for discharge at this time  - ED return precautions given  - She voiced understanding and is in agreement with the plan                         Clinical Impression:   Final diagnoses:  [Z3A.32] 32 weeks gestation of pregnancy  [I10] Chronic hypertension (Primary)  [O47.9] Irregular uterine contractions          ED Disposition Condition    Discharge Stable        ED Prescriptions     None        Follow-up Information    None         Emma Padron MD  OBGYN, PGY-1       Emma Padron MD  Resident  05/17/22 1033

## 2022-05-16 NOTE — TELEPHONE ENCOUNTER
PNT called.  Pt is ctx but cannot feel them.  Having 5 ctxs in 20 minutes.  BPs are 140-162's/'s with no pre-e symptoms.      Advised them to send her to OB ED.  Will keep Dr. Amaro informed.

## 2022-05-17 ENCOUNTER — PATIENT MESSAGE (OUTPATIENT)
Dept: MATERNAL FETAL MEDICINE | Facility: CLINIC | Age: 32
End: 2022-05-17
Payer: COMMERCIAL

## 2022-05-18 ENCOUNTER — PROCEDURE VISIT (OUTPATIENT)
Dept: MATERNAL FETAL MEDICINE | Facility: CLINIC | Age: 32
End: 2022-05-18
Attending: STUDENT IN AN ORGANIZED HEALTH CARE EDUCATION/TRAINING PROGRAM
Payer: COMMERCIAL

## 2022-05-18 ENCOUNTER — HOSPITAL ENCOUNTER (EMERGENCY)
Facility: OTHER | Age: 32
Discharge: HOME | End: 2022-05-18
Payer: COMMERCIAL

## 2022-05-18 ENCOUNTER — OFFICE VISIT (OUTPATIENT)
Dept: MATERNAL FETAL MEDICINE | Facility: CLINIC | Age: 32
End: 2022-05-18
Payer: COMMERCIAL

## 2022-05-18 VITALS
SYSTOLIC BLOOD PRESSURE: 142 MMHG | RESPIRATION RATE: 18 BRPM | DIASTOLIC BLOOD PRESSURE: 98 MMHG | TEMPERATURE: 97 F | HEART RATE: 89 BPM | OXYGEN SATURATION: 98 %

## 2022-05-18 VITALS
DIASTOLIC BLOOD PRESSURE: 106 MMHG | WEIGHT: 181.88 LBS | HEIGHT: 62 IN | SYSTOLIC BLOOD PRESSURE: 165 MMHG | BODY MASS INDEX: 33.47 KG/M2

## 2022-05-18 DIAGNOSIS — O35.EXX0 PYELECTASIS OF FETUS ON PRENATAL ULTRASOUND: Primary | ICD-10-CM

## 2022-05-18 DIAGNOSIS — Z36.89 ENCOUNTER FOR ULTRASOUND TO ASSESS FETAL GROWTH: ICD-10-CM

## 2022-05-18 DIAGNOSIS — O35.EXX0 PYELECTASIS OF FETUS ON PRENATAL ULTRASOUND: ICD-10-CM

## 2022-05-18 DIAGNOSIS — O16.3 HYPERTENSION AFFECTING PREGNANCY IN THIRD TRIMESTER: ICD-10-CM

## 2022-05-18 DIAGNOSIS — I10 CHRONIC HYPERTENSION: Primary | ICD-10-CM

## 2022-05-18 DIAGNOSIS — O24.410 DIET CONTROLLED GESTATIONAL DIABETES MELLITUS (GDM) IN THIRD TRIMESTER: ICD-10-CM

## 2022-05-18 DIAGNOSIS — O16.2 HYPERTENSION AFFECTING PREGNANCY IN SECOND TRIMESTER: ICD-10-CM

## 2022-05-18 DIAGNOSIS — O24.419 GESTATIONAL DIABETES MELLITUS (GDM) IN THIRD TRIMESTER, GESTATIONAL DIABETES METHOD OF CONTROL UNSPECIFIED: ICD-10-CM

## 2022-05-18 PROBLEM — Z01.419 WELL WOMAN EXAM: Status: RESOLVED | Noted: 2020-06-04 | Resolved: 2022-05-18

## 2022-05-18 LAB
ALBUMIN SERPL BCP-MCNC: 2.8 G/DL (ref 3.5–5.2)
ALP SERPL-CCNC: 163 U/L (ref 55–135)
ALT SERPL W/O P-5'-P-CCNC: 17 U/L (ref 10–44)
ANION GAP SERPL CALC-SCNC: 12 MMOL/L (ref 8–16)
AST SERPL-CCNC: 17 U/L (ref 10–40)
BASOPHILS # BLD AUTO: 0.02 K/UL (ref 0–0.2)
BASOPHILS NFR BLD: 0.2 % (ref 0–1.9)
BILIRUB SERPL-MCNC: 0.3 MG/DL (ref 0.1–1)
BUN SERPL-MCNC: 8 MG/DL (ref 6–20)
CALCIUM SERPL-MCNC: 8.9 MG/DL (ref 8.7–10.5)
CHLORIDE SERPL-SCNC: 106 MMOL/L (ref 95–110)
CO2 SERPL-SCNC: 19 MMOL/L (ref 23–29)
CREAT SERPL-MCNC: 0.6 MG/DL (ref 0.5–1.4)
CREAT UR-MCNC: 229.8 MG/DL (ref 15–325)
DIFFERENTIAL METHOD: ABNORMAL
EOSINOPHIL # BLD AUTO: 0.1 K/UL (ref 0–0.5)
EOSINOPHIL NFR BLD: 1.2 % (ref 0–8)
ERYTHROCYTE [DISTWIDTH] IN BLOOD BY AUTOMATED COUNT: 12.9 % (ref 11.5–14.5)
EST. GFR  (AFRICAN AMERICAN): >60 ML/MIN/1.73 M^2
EST. GFR  (NON AFRICAN AMERICAN): >60 ML/MIN/1.73 M^2
GLUCOSE SERPL-MCNC: 104 MG/DL (ref 70–110)
HCT VFR BLD AUTO: 35.7 % (ref 37–48.5)
HGB BLD-MCNC: 12.2 G/DL (ref 12–16)
IMM GRANULOCYTES # BLD AUTO: 0.07 K/UL (ref 0–0.04)
IMM GRANULOCYTES NFR BLD AUTO: 0.8 % (ref 0–0.5)
LYMPHOCYTES # BLD AUTO: 1.9 K/UL (ref 1–4.8)
LYMPHOCYTES NFR BLD: 20.5 % (ref 18–48)
MCH RBC QN AUTO: 31.7 PG (ref 27–31)
MCHC RBC AUTO-ENTMCNC: 34.2 G/DL (ref 32–36)
MCV RBC AUTO: 93 FL (ref 82–98)
MONOCYTES # BLD AUTO: 0.5 K/UL (ref 0.3–1)
MONOCYTES NFR BLD: 5.7 % (ref 4–15)
NEUTROPHILS # BLD AUTO: 6.6 K/UL (ref 1.8–7.7)
NEUTROPHILS NFR BLD: 71.6 % (ref 38–73)
NRBC BLD-RTO: 0 /100 WBC
PLATELET # BLD AUTO: 236 K/UL (ref 150–450)
PMV BLD AUTO: 10.5 FL (ref 9.2–12.9)
POTASSIUM SERPL-SCNC: 4.4 MMOL/L (ref 3.5–5.1)
PROT SERPL-MCNC: 6.3 G/DL (ref 6–8.4)
PROT UR-MCNC: 46 MG/DL (ref 0–15)
PROT/CREAT UR: 0.2 MG/G{CREAT} (ref 0–0.2)
RBC # BLD AUTO: 3.85 M/UL (ref 4–5.4)
SODIUM SERPL-SCNC: 137 MMOL/L (ref 136–145)
WBC # BLD AUTO: 9.27 K/UL (ref 3.9–12.7)

## 2022-05-18 PROCEDURE — 3008F PR BODY MASS INDEX (BMI) DOCUMENTED: ICD-10-PCS | Mod: CPTII,S$GLB,, | Performed by: OBSTETRICS & GYNECOLOGY

## 2022-05-18 PROCEDURE — 3080F PR MOST RECENT DIASTOLIC BLOOD PRESSURE >= 90 MM HG: ICD-10-PCS | Mod: CPTII,S$GLB,, | Performed by: OBSTETRICS & GYNECOLOGY

## 2022-05-18 PROCEDURE — 3080F DIAST BP >= 90 MM HG: CPT | Mod: CPTII,S$GLB,, | Performed by: OBSTETRICS & GYNECOLOGY

## 2022-05-18 PROCEDURE — 87081 CULTURE SCREEN ONLY: CPT

## 2022-05-18 PROCEDURE — 59025 FETAL NON-STRESS TEST: CPT | Mod: 26,,, | Performed by: OBSTETRICS & GYNECOLOGY

## 2022-05-18 PROCEDURE — 99284 EMERGENCY DEPT VISIT MOD MDM: CPT | Mod: 25

## 2022-05-18 PROCEDURE — 99213 PR OFFICE/OUTPT VISIT, EST, LEVL III, 20-29 MIN: ICD-10-PCS | Mod: 25,S$GLB,, | Performed by: OBSTETRICS & GYNECOLOGY

## 2022-05-18 PROCEDURE — 3077F PR MOST RECENT SYSTOLIC BLOOD PRESSURE >= 140 MM HG: ICD-10-PCS | Mod: CPTII,S$GLB,, | Performed by: OBSTETRICS & GYNECOLOGY

## 2022-05-18 PROCEDURE — 99213 OFFICE O/P EST LOW 20 MIN: CPT | Mod: 25,S$GLB,, | Performed by: OBSTETRICS & GYNECOLOGY

## 2022-05-18 PROCEDURE — 85025 COMPLETE CBC W/AUTO DIFF WBC: CPT

## 2022-05-18 PROCEDURE — 59025 FETAL NON-STRESS TEST: CPT

## 2022-05-18 PROCEDURE — 3077F SYST BP >= 140 MM HG: CPT | Mod: CPTII,S$GLB,, | Performed by: OBSTETRICS & GYNECOLOGY

## 2022-05-18 PROCEDURE — 1159F MED LIST DOCD IN RCRD: CPT | Mod: CPTII,S$GLB,, | Performed by: OBSTETRICS & GYNECOLOGY

## 2022-05-18 PROCEDURE — 76816 PR  US,PREGNANT UTERUS,F/U,TRANSABD APP: ICD-10-PCS | Mod: S$GLB,,, | Performed by: OBSTETRICS & GYNECOLOGY

## 2022-05-18 PROCEDURE — 84156 ASSAY OF PROTEIN URINE: CPT

## 2022-05-18 PROCEDURE — 76819 PR US, OB, FETAL BIOPHYSICAL, W/O NST: ICD-10-PCS | Mod: S$GLB,,, | Performed by: OBSTETRICS & GYNECOLOGY

## 2022-05-18 PROCEDURE — 99999 PR PBB SHADOW E&M-EST. PATIENT-LVL III: CPT | Mod: PBBFAC,,, | Performed by: OBSTETRICS & GYNECOLOGY

## 2022-05-18 PROCEDURE — 76816 OB US FOLLOW-UP PER FETUS: CPT | Mod: S$GLB,,, | Performed by: OBSTETRICS & GYNECOLOGY

## 2022-05-18 PROCEDURE — 1159F PR MEDICATION LIST DOCUMENTED IN MEDICAL RECORD: ICD-10-PCS | Mod: CPTII,S$GLB,, | Performed by: OBSTETRICS & GYNECOLOGY

## 2022-05-18 PROCEDURE — 99284 PR EMERGENCY DEPT VISIT,LEVEL IV: ICD-10-PCS | Mod: 25,,, | Performed by: OBSTETRICS & GYNECOLOGY

## 2022-05-18 PROCEDURE — 99284 EMERGENCY DEPT VISIT MOD MDM: CPT | Mod: 25,,, | Performed by: OBSTETRICS & GYNECOLOGY

## 2022-05-18 PROCEDURE — 59025 PR FETAL 2N-STRESS TEST: ICD-10-PCS | Mod: 26,,, | Performed by: OBSTETRICS & GYNECOLOGY

## 2022-05-18 PROCEDURE — 99999 PR PBB SHADOW E&M-EST. PATIENT-LVL III: ICD-10-PCS | Mod: PBBFAC,,, | Performed by: OBSTETRICS & GYNECOLOGY

## 2022-05-18 PROCEDURE — 3008F BODY MASS INDEX DOCD: CPT | Mod: CPTII,S$GLB,, | Performed by: OBSTETRICS & GYNECOLOGY

## 2022-05-18 PROCEDURE — 76819 FETAL BIOPHYS PROFIL W/O NST: CPT | Mod: S$GLB,,, | Performed by: OBSTETRICS & GYNECOLOGY

## 2022-05-18 PROCEDURE — 80053 COMPREHEN METABOLIC PANEL: CPT

## 2022-05-18 NOTE — ASSESSMENT & PLAN NOTE
I was only briefly able to counseled patient regarding this diagnosis.  Per records, this was made on an elevated 1 hour screening I came back over 200.  Patient has been monitoring her sugars on and off with very infrequent values noted.  Overall values seem to be either at range or slightly above.  She has not seen diabetic Education yet as she was in the OB ED at the time.  Due to urgency of needing to send patient to OB ED for evaluation we were not able to fully  regarding this diagnosis.    Recommendations:   Patient to be refer to diabetic Education soon as possible   Continue 4 times daily blood sugar checks (fasting, 2 hours post breakfast, lunch and dinner)   We will see patient in 1 week to  fully about GDM diagnosis as well as plan.  She will plan to bring her sugar log at that time and we will hopefully have a week of values to help guide therapy.   PNT and timing of delivery per comorbidities.

## 2022-05-18 NOTE — PROGRESS NOTES
"Maternal Fetal Medicine follow up consult    SUBJECTIVE:     Cherrie Wells is a 32 y.o.  female with IUP at 32w6d who is seen in follow up consultation by MFM.  Pregnancy complications include:   Problem   Diet Controlled Gestational Diabetes Mellitus (Gdm) in Third Trimester   Hypertension Affecting Pregnancy in Second Trimester   Pyelectasis of Fetus On Prenatal Ultrasound       Previous notes reviewed.   No changes to medical, surgical, family, social, or obstetric history.    Interval history since last MFM visit:   Patient reports feeling a bit "off" today. Unable to verbalize better. Denies HA/BV/RUQ pain.  Patient denies any contractions/cramping, vaginal bleeding or leakage of fluid.  She reports good fetal movement.  She reports taking her BP meds on time this morning.    Medications:  Current Outpatient Medications   Medication Instructions    aspirin 81 mg, Oral, Daily    blood sugar diagnostic Strp 1 each, Misc.(Non-Drug; Combo Route), 4 times daily    blood-glucose meter kit Use as instructed    EScitalopram oxalate (LEXAPRO) 20 mg, Oral, Daily    labetaloL (NORMODYNE) 200 mg, Oral, 2 times daily    lancets (LANCETS,ULTRA THIN) Misc 1 each, Misc.(Non-Drug; Combo Route), Every 6 hours    NIFEdipine (PROCARDIA-XL) 90 mg, Oral, Daily    prenatal 25/iron fum/folic/dha (PRENATAL-1 ORAL) Oral       Care team members:  Bennie Gonzalez - Primary OB     OBJECTIVE:   BP (!) 165/106 (BP Location: Left arm, Patient Position: Sitting, BP Method: Large (Automatic))   Ht 5' 2" (1.575 m)   Wt 82.5 kg (181 lb 14.1 oz)   LMP 09/15/2021   BMI 33.27 kg/m²     Physical Exam  deferred    Ultrasound performed. See viewpoint for full ultrasound report.  Salomon live IUP  The BPP score is reassuring at 8/8, and the MVP is normal.   Renal pelvis measurements are below threshold for UTD (R - 6.6 mm, L - 5.5mm).  Renal anatomy appears unremarkable. The parenchyma of the kidneys appears normal. The ureter was not " "visualized. The bladder appears normal.     Significant labs/imaging:  Lab Results   Component Value Date    WBC 9.67 2022    HGB 11.4 (L) 2022    HCT 33.7 (L) 2022    MCV 94 2022     2022     CMP - WNL    PCR - RUST low    ASSESSMENT/PLAN:     32 y.o.  female with IUP at 32w6d    Hypertension affecting pregnancy in second trimester  Patient carries a diagnosis of CHTN for which she was previously seen by [facility]. Please see original consult for full counseling/recommendations.  Since that time, she has had increases in her blood pressure medication.  She is currently on labetalol 200 b.i.d. and Procardia 90 XL daily.  On today's visit patient has had 2 severe range pressures per above.  Although she denies any specific preeclampsia symptoms, she does feel not well"    Most recent OB ED visit was 2 days prior at which point her labs were all within normal limits per above.    Recommendations:  Evaluation in the OB ED given severe range blood pressures.    If outpatient management is continued:  -Serial fetal growth ultrasounds every 4-6 weeks  -Continued close observation of patient's blood pressures. Avoid hypotension as this has been associated with uteroplacental insufficiency.  -Recommend treatment to a goal blood pressure < 140/90  -Twice weekly antepartum testing (NST+AFV);due to requiring several medications for control   -Delivery timing:  Uncontrolled or requiring ? 2 medications: 36 0/7 - 37 6/7 weeks gestation (CURRENTLY)        Diet controlled gestational diabetes mellitus (GDM) in third trimester  I was only briefly able to counseled patient regarding this diagnosis.  Per records, this was made on an elevated 1 hour screening I came back over 200.  Patient has been monitoring her sugars on and off with very infrequent values noted.  Overall values seem to be either at range or slightly above.  She has not seen diabetic Education yet as she was in the OB ED at the " time.  Due to urgency of needing to send patient to OB ED for evaluation we were not able to fully  regarding this diagnosis.    Recommendations:   Patient to be refer to diabetic Education soon as possible   Continue 4 times daily blood sugar checks (fasting, 2 hours post breakfast, lunch and dinner)   We will see patient in 1 week to  fully about GDM diagnosis as well as plan.  She will plan to bring her sugar log at that time and we will hopefully have a week of values to help guide therapy.   PNT and timing of delivery per comorbidities.      Pyelectasis of fetus on prenatal ultrasound  Patient was counseled regarding today's ultrasound.  We no longer see urinary tract dilation as both renal pelvis are below threshold of abnormal.    Recommendations:   Consideration can be given for  pediatric evaluation based on previously seen bilateral UTD A1   No further ultrasounds are recommended for this.      Patient's other comorbidites were not addressed in today's visit.  If primary OB provider would like MFM input on these, please feel free to reconsult as clinically indicated.  Otherwise, will defer management to primary OB provider      FOLLOW UP:   A follow up ultrasound was made for 1 week from today for BPP. A follow up MFM MD visit was made for same day to rediscuss GDM diagnosis      The patient was given an opportunity to ask questions about the management of her high risk pregnancy problems. She expressed an understanding of and agreement to the above impression and plan. All questions were answered to her satisfaction.    25 minutes of total time spent on the encounter, which includes face to face time and non-face to face time preparing to see the patient (eg, review of tests), obtaining and/or reviewing separately obtained history, documenting clinical information in the electronic or other health record, independently interpreting results (not separately reported) and  communicating results to the patient/family/caregiver, or care coordination (not separately reported).        Tylor Huggins MD   Maternal-Fetal Medicine      Electronically Signed by Tylor Huggins May 18, 2022

## 2022-05-18 NOTE — ASSESSMENT & PLAN NOTE
Patient was counseled regarding today's ultrasound.  We no longer see urinary tract dilation as both renal pelvis are below threshold of abnormal.    Recommendations:   Consideration can be given for  pediatric evaluation based on previously seen bilateral UTD A1   No further ultrasounds are recommended for this.

## 2022-05-18 NOTE — ASSESSMENT & PLAN NOTE
"Patient carries a diagnosis of CHTN for which she was previously seen by MFM. Please see original consult for full counseling/recommendations.  Since that time, she has had increases in her blood pressure medication.  She is currently on labetalol 200 b.i.d. and Procardia 90 XL daily.  On today's visit patient has had 2 severe range pressures per above.  Although she denies any specific preeclampsia symptoms, she does feel not well"    Most recent OB ED visit was 2 days prior at which point her labs were all within normal limits per above.    Recommendations:  Evaluation in the OB ED given severe range blood pressures.    If outpatient management is continued:  -Serial fetal growth ultrasounds every 4-6 weeks  -Continued close observation of patient's blood pressures. Avoid hypotension as this has been associated with uteroplacental insufficiency.  -Recommend treatment to a goal blood pressure < 140/90  -Twice weekly antepartum testing (NST+AFV);due to requiring several medications for control   -Delivery timing:  Uncontrolled or requiring ? 2 medications: 36 0/7 - 37 6/7 weeks gestation (CURRENTLY)      "

## 2022-05-18 NOTE — ED PROVIDER NOTES
Encounter Date: 2022       History     Chief Complaint   Patient presents with    Hypertension     HPI   Cherrie Wells is a 32 y.o. F at 32w6d presents from prenatal testing for multiple severely elevated BP (multiple greater than 160s/100s). Patient in PNT for cHTN. Denies headache, scotoma, RUQ pain, worsening shortness of breath, or worsening edema.     This IUP is complicated by cHTN (procardia 90, labetalol 200 mg BID), A1GDM,  h/o opioid abuse (not currently on suboxone), resolved low lying placenta, fetal renal pyelectasis. Patient denies contractions, denies vaginal bleeding, denies LOF.   Fetal Movement: normal.   Review of patient's allergies indicates:  No Known Allergies  Past Medical History:   Diagnosis Date    Hypertension     Substance abuse      Past Surgical History:   Procedure Laterality Date    ANKLE SURGERY Left 2003    TONSILLECTOMY       Family History   Problem Relation Age of Onset    Breast cancer Paternal Grandmother     Diabetes Father     Hypertension Father     Colon cancer Neg Hx     Ovarian cancer Neg Hx      Social History     Tobacco Use    Smoking status: Former Smoker    Smokeless tobacco: Never Used   Substance Use Topics    Alcohol use: Not Currently     Comment: social    Drug use: No     Review of Systems   Constitutional: Negative for chills and fever.   Eyes: Negative for photophobia and visual disturbance.   Respiratory: Negative for cough and shortness of breath.    Cardiovascular: Negative for chest pain, palpitations and leg swelling.   Gastrointestinal: Negative for abdominal pain, nausea and vomiting.   Genitourinary: Negative for difficulty urinating, dysuria, flank pain, frequency, hematuria, pelvic pain, urgency, vaginal bleeding, vaginal discharge and vaginal pain.   Neurological: Negative for headaches.       Physical Exam     Initial Vitals   BP Pulse Resp Temp SpO2   22 1126 22 1120 22 1124 22 1120 22 1120    (!) 155/110 82 18 97.3 °F (36.3 °C) 99 %      MAP       --                Physical Exam    Vitals reviewed.  Constitutional: She appears well-developed and well-nourished.   HENT:   Head: Normocephalic.   Eyes: Conjunctivae are normal. Pupils are equal, round, and reactive to light.   Neck:   Normal range of motion.  Cardiovascular: Normal rate.   Pulmonary/Chest: Effort normal. No respiratory distress.   Abdominal: Abdomen is soft. There is no abdominal tenderness.   No right CVA tenderness.  No left CVA tenderness. There is no rebound and no guarding.   Musculoskeletal:         General: Normal range of motion.      Cervical back: Normal range of motion.     Neurological: She is alert and oriented to person, place, and time.   Skin: Skin is warm and dry. Capillary refill takes less than 2 seconds.   Psychiatric: She has a normal mood and affect. Her behavior is normal. Judgment and thought content normal.         ED Course   Obtain Fetal nonstress test (NST)    Date/Time: 5/18/2022 11:58 AM  Performed by: Ada Betancourt MD  Authorized by: Ada Betancourt MD     Nonstress Test:     Variability:  6-25 BPM    Decelerations:  None    Accelerations:  15 bpm    Baseline:  155    Contractions:  Irregular  Biophysical Profile:     Nonstress Test Interpretation: reactive      Overall Impression:  Reassuring  Post-procedure:     Patient tolerance:  Patient tolerated the procedure well with no immediate complications      Labs Reviewed   PROTEIN / CREATININE RATIO, URINE - Abnormal; Notable for the following components:       Result Value    Protein, Urine Random 46 (*)     All other components within normal limits    Narrative:     Specimen Source->Urine   CBC W/ AUTO DIFFERENTIAL - Abnormal; Notable for the following components:    RBC 3.85 (*)     Hematocrit 35.7 (*)     MCH 31.7 (*)     Immature Granulocytes 0.8 (*)     Immature Grans (Abs) 0.07 (*)     All other components within normal limits   COMPREHENSIVE METABOLIC  PANEL - Abnormal; Notable for the following components:    CO2 19 (*)     Albumin 2.8 (*)     Alkaline Phosphatase 163 (*)     All other components within normal limits   STREP B SCREEN, VAGINAL / RECTAL          Imaging Results    None          Medications - No data to display  Medical Decision Making:   ED Management:  1. Severely Elevated BP in the setting of cHTN (on procardia 90 mg qd, labetalol 200 mg BID)  - One severely elevated BP followed by remaining mildly elevated BP, all other vitals were wnl   - NST RR; TOCO irregular contractions   - Asymptomatic  - CBC: wnl  - CMP:wnl  - PCR: 0.21, will get 24 hour urine   - GBS collected   - SVE: 0/50/-3   - Pre-eclampsia precautions given. Patient has follow-up appointment scheduled with [facility].   - Discussed with OB ED staff, patient stable for discharge.             Attending Attestation:   Physician Attestation Statement for Resident:  As the supervising MD   Physician Attestation Statement: I have personally seen and examined this patient.   I agree with the above history. -:   As the supervising MD I agree with the above PE.    As the supervising MD I agree with the above treatment, course, plan, and disposition.  I was personally present during the critical portions of the procedure(s) performed by the resident and was immediately available in the ED to provide services and assistance as needed during the entire procedure.                         Clinical Impression:   Final diagnoses:  [I10] Chronic hypertension (Primary)          ED Disposition Condition    Discharge Stable        ED Prescriptions     None        Follow-up Information    None          Ada Betancourt MD  Resident  05/18/22 0524       Saloni Voss MD  05/18/22 3221

## 2022-05-18 NOTE — ED TRIAGE NOTES
Pt presents to [facility] from [facility] clinic.  Pt has pre-existing HTN and GDM and had two severe range BPs in clinic.  Dr. Betancourt notified of pt's arrival.

## 2022-05-18 NOTE — DISCHARGE INSTRUCTIONS
Labor Precautions  Return if you experience contractions, uterine tightening or cramps(more than 4 in 1 hour for 2 consecutive hours)  Backache that comes and goes  Pelvic pressure  Change in vaginal discharge  Vaginal Bleeding  Leaking of fluid  Call the OB [phone]) during regular business hours or L&D([phone]) after hours for any questions or concerns  Keep previously scheduled clinic appointment  Drink 8-10 glasses of water a day

## 2022-05-19 ENCOUNTER — ROUTINE PRENATAL (OUTPATIENT)
Dept: OBSTETRICS AND GYNECOLOGY | Facility: CLINIC | Age: 32
End: 2022-05-19
Attending: STUDENT IN AN ORGANIZED HEALTH CARE EDUCATION/TRAINING PROGRAM
Payer: COMMERCIAL

## 2022-05-19 VITALS
SYSTOLIC BLOOD PRESSURE: 130 MMHG | BODY MASS INDEX: 33.67 KG/M2 | WEIGHT: 184.06 LBS | DIASTOLIC BLOOD PRESSURE: 84 MMHG

## 2022-05-19 DIAGNOSIS — O10.919 CHRONIC HYPERTENSION AFFECTING PREGNANCY: Primary | ICD-10-CM

## 2022-05-19 DIAGNOSIS — O24.419 GESTATIONAL DIABETES MELLITUS (GDM) IN THIRD TRIMESTER, GESTATIONAL DIABETES METHOD OF CONTROL UNSPECIFIED: ICD-10-CM

## 2022-05-19 PROCEDURE — 0502F PR SUBSEQUENT PRENATAL CARE: ICD-10-PCS | Mod: CPTII,S$GLB,, | Performed by: STUDENT IN AN ORGANIZED HEALTH CARE EDUCATION/TRAINING PROGRAM

## 2022-05-19 PROCEDURE — 99999 PR PBB SHADOW E&M-EST. PATIENT-LVL II: ICD-10-PCS | Mod: PBBFAC,,, | Performed by: STUDENT IN AN ORGANIZED HEALTH CARE EDUCATION/TRAINING PROGRAM

## 2022-05-19 PROCEDURE — 0502F SUBSEQUENT PRENATAL CARE: CPT | Mod: CPTII,S$GLB,, | Performed by: STUDENT IN AN ORGANIZED HEALTH CARE EDUCATION/TRAINING PROGRAM

## 2022-05-19 PROCEDURE — 99999 PR PBB SHADOW E&M-EST. PATIENT-LVL II: CPT | Mod: PBBFAC,,, | Performed by: STUDENT IN AN ORGANIZED HEALTH CARE EDUCATION/TRAINING PROGRAM

## 2022-05-19 NOTE — PROGRESS NOTES
Doing well.  No complaints.  Reports fetal movement.  Denies contractions, leakage of fluid, vaginal bleeding.  No HA, vision changes, CP, SOB, palpitations.  Seen by MFM yesterday but sent to [facility] secondary to severe range Bps.  Follow up scheduled next week.  NST tomorrow.  Still needs EKG and echo.   All questions answered.  RTC as scheduled or sooner if needed.

## 2022-05-20 ENCOUNTER — HOSPITAL ENCOUNTER (OUTPATIENT)
Dept: PERINATAL CARE | Facility: OTHER | Age: 32
Discharge: HOME | End: 2022-05-20
Attending: STUDENT IN AN ORGANIZED HEALTH CARE EDUCATION/TRAINING PROGRAM
Payer: COMMERCIAL

## 2022-05-20 ENCOUNTER — PATIENT MESSAGE (OUTPATIENT)
Dept: OBSTETRICS AND GYNECOLOGY | Facility: CLINIC | Age: 32
End: 2022-05-20
Payer: COMMERCIAL

## 2022-05-20 DIAGNOSIS — O10.919 CHRONIC HYPERTENSION AFFECTING PREGNANCY: ICD-10-CM

## 2022-05-20 PROCEDURE — 59025 FETAL NON-STRESS TEST: CPT

## 2022-05-20 PROCEDURE — 59025 PRENATAL TESTING - NST/AFI: ICD-10-PCS | Mod: 26,,, | Performed by: OBSTETRICS & GYNECOLOGY

## 2022-05-20 PROCEDURE — 59025 FETAL NON-STRESS TEST: CPT | Mod: 26,,, | Performed by: OBSTETRICS & GYNECOLOGY

## 2022-05-21 LAB — BACTERIA SPEC AEROBE CULT: NORMAL

## 2022-05-23 ENCOUNTER — HOSPITAL ENCOUNTER (OUTPATIENT)
Dept: CARDIOLOGY | Facility: OTHER | Age: 32
Discharge: HOME | End: 2022-05-23
Attending: STUDENT IN AN ORGANIZED HEALTH CARE EDUCATION/TRAINING PROGRAM
Payer: COMMERCIAL

## 2022-05-23 ENCOUNTER — HOSPITAL ENCOUNTER (OUTPATIENT)
Dept: PERINATAL CARE | Facility: OTHER | Age: 32
Discharge: HOME | End: 2022-05-23
Attending: STUDENT IN AN ORGANIZED HEALTH CARE EDUCATION/TRAINING PROGRAM
Payer: COMMERCIAL

## 2022-05-23 DIAGNOSIS — O24.419 GESTATIONAL DIABETES MELLITUS (GDM) IN THIRD TRIMESTER, GESTATIONAL DIABETES METHOD OF CONTROL UNSPECIFIED: ICD-10-CM

## 2022-05-23 DIAGNOSIS — O10.919 CHRONIC HYPERTENSION AFFECTING PREGNANCY: ICD-10-CM

## 2022-05-23 PROCEDURE — 76818 PRENATAL TESTING - NST/AFI: ICD-10-PCS | Mod: 26,,, | Performed by: OBSTETRICS & GYNECOLOGY

## 2022-05-23 PROCEDURE — 93010 EKG 12-LEAD: ICD-10-PCS | Mod: ,,, | Performed by: INTERNAL MEDICINE

## 2022-05-23 PROCEDURE — 93010 ELECTROCARDIOGRAM REPORT: CPT | Mod: ,,, | Performed by: INTERNAL MEDICINE

## 2022-05-23 PROCEDURE — 93005 ELECTROCARDIOGRAM TRACING: CPT

## 2022-05-23 PROCEDURE — 76818 FETAL BIOPHYS PROFILE W/NST: CPT

## 2022-05-23 PROCEDURE — 76818 FETAL BIOPHYS PROFILE W/NST: CPT | Mod: 26,,, | Performed by: OBSTETRICS & GYNECOLOGY

## 2022-05-23 RX ORDER — NIFEDIPINE 90 MG/1
90 TABLET, EXTENDED RELEASE ORAL DAILY
Qty: 30 TABLET | Refills: 3 | Status: SHIPPED | OUTPATIENT
Start: 2022-05-23 | End: 2023-05-23

## 2022-05-23 RX ORDER — NIFEDIPINE 90 MG/1
90 TABLET, EXTENDED RELEASE ORAL DAILY
Qty: 30 TABLET | Refills: 0 | OUTPATIENT
Start: 2022-05-23 | End: 2023-05-23

## 2022-05-25 ENCOUNTER — PATIENT MESSAGE (OUTPATIENT)
Dept: MATERNAL FETAL MEDICINE | Facility: CLINIC | Age: 32
End: 2022-05-25
Payer: COMMERCIAL

## 2022-05-25 ENCOUNTER — TELEPHONE (OUTPATIENT)
Dept: ADMINISTRATIVE | Facility: HOSPITAL | Age: 32
End: 2022-05-25
Payer: COMMERCIAL

## 2022-05-26 ENCOUNTER — HOSPITAL ENCOUNTER (EMERGENCY)
Facility: OTHER | Age: 32
Discharge: HOME | End: 2022-05-26
Attending: OBSTETRICS & GYNECOLOGY
Payer: COMMERCIAL

## 2022-05-26 ENCOUNTER — OFFICE VISIT (OUTPATIENT)
Dept: MATERNAL FETAL MEDICINE | Facility: CLINIC | Age: 32
End: 2022-05-26
Payer: COMMERCIAL

## 2022-05-26 ENCOUNTER — PROCEDURE VISIT (OUTPATIENT)
Dept: MATERNAL FETAL MEDICINE | Facility: CLINIC | Age: 32
End: 2022-05-26
Payer: COMMERCIAL

## 2022-05-26 VITALS
HEART RATE: 91 BPM | OXYGEN SATURATION: 98 % | DIASTOLIC BLOOD PRESSURE: 92 MMHG | SYSTOLIC BLOOD PRESSURE: 141 MMHG | RESPIRATION RATE: 16 BRPM | TEMPERATURE: 97 F

## 2022-05-26 VITALS
DIASTOLIC BLOOD PRESSURE: 101 MMHG | HEIGHT: 62 IN | WEIGHT: 186.06 LBS | BODY MASS INDEX: 34.24 KG/M2 | SYSTOLIC BLOOD PRESSURE: 139 MMHG

## 2022-05-26 DIAGNOSIS — O11.9 PRE-ECLAMPSIA SUPERIMPOSED ON CHRONIC HYPERTENSION: Primary | ICD-10-CM

## 2022-05-26 DIAGNOSIS — O35.EXX0 PYELECTASIS OF FETUS ON PRENATAL ULTRASOUND: ICD-10-CM

## 2022-05-26 DIAGNOSIS — Z3A.34 34 WEEKS GESTATION OF PREGNANCY: ICD-10-CM

## 2022-05-26 DIAGNOSIS — O24.410 DIET CONTROLLED GESTATIONAL DIABETES MELLITUS (GDM) IN THIRD TRIMESTER: ICD-10-CM

## 2022-05-26 DIAGNOSIS — O16.2 HYPERTENSION AFFECTING PREGNANCY IN SECOND TRIMESTER: ICD-10-CM

## 2022-05-26 LAB
ALBUMIN SERPL BCP-MCNC: 2.7 G/DL (ref 3.5–5.2)
ALP SERPL-CCNC: 179 U/L (ref 55–135)
ALT SERPL W/O P-5'-P-CCNC: 13 U/L (ref 10–44)
ANION GAP SERPL CALC-SCNC: 10 MMOL/L (ref 8–16)
AST SERPL-CCNC: 18 U/L (ref 10–40)
BASOPHILS # BLD AUTO: 0.05 K/UL (ref 0–0.2)
BASOPHILS NFR BLD: 0.5 % (ref 0–1.9)
BILIRUB SERPL-MCNC: 0.3 MG/DL (ref 0.1–1)
BUN SERPL-MCNC: 9 MG/DL (ref 6–20)
CALCIUM SERPL-MCNC: 9.2 MG/DL (ref 8.7–10.5)
CHLORIDE SERPL-SCNC: 106 MMOL/L (ref 95–110)
CO2 SERPL-SCNC: 21 MMOL/L (ref 23–29)
CREAT SERPL-MCNC: 0.7 MG/DL (ref 0.5–1.4)
CREAT UR-MCNC: 153.8 MG/DL (ref 15–325)
DIFFERENTIAL METHOD: ABNORMAL
EOSINOPHIL # BLD AUTO: 0.2 K/UL (ref 0–0.5)
EOSINOPHIL NFR BLD: 2.4 % (ref 0–8)
ERYTHROCYTE [DISTWIDTH] IN BLOOD BY AUTOMATED COUNT: 12.6 % (ref 11.5–14.5)
EST. GFR  (AFRICAN AMERICAN): >60 ML/MIN/1.73 M^2
EST. GFR  (NON AFRICAN AMERICAN): >60 ML/MIN/1.73 M^2
GLUCOSE SERPL-MCNC: 109 MG/DL (ref 70–110)
HCT VFR BLD AUTO: 34.9 % (ref 37–48.5)
HGB BLD-MCNC: 11.8 G/DL (ref 12–16)
IMM GRANULOCYTES # BLD AUTO: 0.15 K/UL (ref 0–0.04)
IMM GRANULOCYTES NFR BLD AUTO: 1.5 % (ref 0–0.5)
LYMPHOCYTES # BLD AUTO: 1.9 K/UL (ref 1–4.8)
LYMPHOCYTES NFR BLD: 19.1 % (ref 18–48)
MCH RBC QN AUTO: 31.4 PG (ref 27–31)
MCHC RBC AUTO-ENTMCNC: 33.8 G/DL (ref 32–36)
MCV RBC AUTO: 93 FL (ref 82–98)
MONOCYTES # BLD AUTO: 0.5 K/UL (ref 0.3–1)
MONOCYTES NFR BLD: 5.2 % (ref 4–15)
NEUTROPHILS # BLD AUTO: 7 K/UL (ref 1.8–7.7)
NEUTROPHILS NFR BLD: 71.3 % (ref 38–73)
NRBC BLD-RTO: 0 /100 WBC
PLATELET # BLD AUTO: 209 K/UL (ref 150–450)
PMV BLD AUTO: 11 FL (ref 9.2–12.9)
POTASSIUM SERPL-SCNC: 4.4 MMOL/L (ref 3.5–5.1)
PROT SERPL-MCNC: 6.2 G/DL (ref 6–8.4)
PROT UR-MCNC: 47 MG/DL (ref 0–15)
PROT/CREAT UR: 0.31 MG/G{CREAT} (ref 0–0.2)
RBC # BLD AUTO: 3.76 M/UL (ref 4–5.4)
SODIUM SERPL-SCNC: 137 MMOL/L (ref 136–145)
WBC # BLD AUTO: 9.78 K/UL (ref 3.9–12.7)

## 2022-05-26 PROCEDURE — 3008F PR BODY MASS INDEX (BMI) DOCUMENTED: ICD-10-PCS | Mod: CPTII,S$GLB,, | Performed by: OBSTETRICS & GYNECOLOGY

## 2022-05-26 PROCEDURE — 3075F SYST BP GE 130 - 139MM HG: CPT | Mod: CPTII,S$GLB,, | Performed by: OBSTETRICS & GYNECOLOGY

## 2022-05-26 PROCEDURE — 59025 PR FETAL 2N-STRESS TEST: ICD-10-PCS | Mod: 26,,, | Performed by: OBSTETRICS & GYNECOLOGY

## 2022-05-26 PROCEDURE — 3008F BODY MASS INDEX DOCD: CPT | Mod: CPTII,S$GLB,, | Performed by: OBSTETRICS & GYNECOLOGY

## 2022-05-26 PROCEDURE — 76819 US MFM PROCEDURE (VIEWPOINT): ICD-10-PCS | Mod: S$GLB,,, | Performed by: OBSTETRICS & GYNECOLOGY

## 2022-05-26 PROCEDURE — 3080F PR MOST RECENT DIASTOLIC BLOOD PRESSURE >= 90 MM HG: ICD-10-PCS | Mod: CPTII,S$GLB,, | Performed by: OBSTETRICS & GYNECOLOGY

## 2022-05-26 PROCEDURE — 99999 PR PBB SHADOW E&M-EST. PATIENT-LVL III: CPT | Mod: PBBFAC,,, | Performed by: OBSTETRICS & GYNECOLOGY

## 2022-05-26 PROCEDURE — 99214 OFFICE O/P EST MOD 30 MIN: CPT | Mod: 25,S$GLB,, | Performed by: OBSTETRICS & GYNECOLOGY

## 2022-05-26 PROCEDURE — 3075F PR MOST RECENT SYSTOLIC BLOOD PRESS GE 130-139MM HG: ICD-10-PCS | Mod: CPTII,S$GLB,, | Performed by: OBSTETRICS & GYNECOLOGY

## 2022-05-26 PROCEDURE — 59025 FETAL NON-STRESS TEST: CPT | Mod: 26,,, | Performed by: OBSTETRICS & GYNECOLOGY

## 2022-05-26 PROCEDURE — 1159F MED LIST DOCD IN RCRD: CPT | Mod: CPTII,S$GLB,, | Performed by: OBSTETRICS & GYNECOLOGY

## 2022-05-26 PROCEDURE — 1159F PR MEDICATION LIST DOCUMENTED IN MEDICAL RECORD: ICD-10-PCS | Mod: CPTII,S$GLB,, | Performed by: OBSTETRICS & GYNECOLOGY

## 2022-05-26 PROCEDURE — 85025 COMPLETE CBC W/AUTO DIFF WBC: CPT | Performed by: OBSTETRICS & GYNECOLOGY

## 2022-05-26 PROCEDURE — 80053 COMPREHEN METABOLIC PANEL: CPT | Performed by: OBSTETRICS & GYNECOLOGY

## 2022-05-26 PROCEDURE — 99284 EMERGENCY DEPT VISIT MOD MDM: CPT | Mod: 25

## 2022-05-26 PROCEDURE — 76819 FETAL BIOPHYS PROFIL W/O NST: CPT | Mod: S$GLB,,, | Performed by: OBSTETRICS & GYNECOLOGY

## 2022-05-26 PROCEDURE — 3080F DIAST BP >= 90 MM HG: CPT | Mod: CPTII,S$GLB,, | Performed by: OBSTETRICS & GYNECOLOGY

## 2022-05-26 PROCEDURE — 82570 ASSAY OF URINE CREATININE: CPT | Performed by: OBSTETRICS & GYNECOLOGY

## 2022-05-26 PROCEDURE — 99284 PR EMERGENCY DEPT VISIT,LEVEL IV: ICD-10-PCS | Mod: 25,,, | Performed by: OBSTETRICS & GYNECOLOGY

## 2022-05-26 PROCEDURE — 99214 PR OFFICE/OUTPT VISIT, EST, LEVL IV, 30-39 MIN: ICD-10-PCS | Mod: 25,S$GLB,, | Performed by: OBSTETRICS & GYNECOLOGY

## 2022-05-26 PROCEDURE — 99999 PR PBB SHADOW E&M-EST. PATIENT-LVL III: ICD-10-PCS | Mod: PBBFAC,,, | Performed by: OBSTETRICS & GYNECOLOGY

## 2022-05-26 PROCEDURE — 59025 FETAL NON-STRESS TEST: CPT

## 2022-05-26 PROCEDURE — 99284 EMERGENCY DEPT VISIT MOD MDM: CPT | Mod: 25,,, | Performed by: OBSTETRICS & GYNECOLOGY

## 2022-05-26 NOTE — PROGRESS NOTES
"Maternal Fetal Medicine follow up consult    SUBJECTIVE:     Cherrie Wells is a 32 y.o.  female with IUP at 34w0d who is seen in follow up consultation by MFM.  Pregnancy complications include:   Problem   Diet Controlled Gestational Diabetes Mellitus (Gdm) in Third Trimester   Hypertension Affecting Pregnancy in Second Trimester   Pyelectasis of Fetus On Prenatal Ultrasound       Previous notes reviewed.   No changes to medical, surgical, family, social, or obstetric history.    Interval history since last MFM visit:     Medications:  Current Outpatient Medications   Medication Instructions    aspirin 81 mg, Oral, Daily    blood sugar diagnostic Strp 1 each, Misc.(Non-Drug; Combo Route), 4 times daily    blood-glucose meter kit Use as instructed    EScitalopram oxalate (LEXAPRO) 20 mg, Oral, Daily    labetaloL (NORMODYNE) 200 mg, Oral, 2 times daily    lancets (LANCETS,ULTRA THIN) Misc 1 each, Misc.(Non-Drug; Combo Route), Every 6 hours    NIFEdipine (PROCARDIA-XL) 90 mg, Oral, Daily    prenatal 25/iron fum/folic/dha (PRENATAL-1 ORAL) Oral       Care team members:  Dr. Amaro - Primary OB     OBJECTIVE:   BP (!) 139/101   Ht 5' 2" (1.575 m)   Wt 84.4 kg (186 lb 1.1 oz)   LMP 09/15/2021   BMI 34.03 kg/m²     Physical Exam    Ultrasound performed. See viewpoint for full ultrasound report.    Significant labs/imaging:  Reviewed CMP, CBC, P/C ratio in Epic.    ASSESSMENT/PLAN:     32 y.o.  female with IUP at 34w0d    Pyelectasis of fetus on prenatal ultrasound  Right renal pelvis measured 5.3 (normal) - 9 mm.  Given variation and previously noted pyelectasis, advise  renal ultrasound.  All imaging has been low risk (A1 UTD) to date.    Hypertension affecting pregnancy in second trimester  BP today 139-156/101-106.  Meds: Procardia 90 mg XL, labetalol 200 mg BID, aspirin 81 mg  Compliant with meds. Asymptomatic today but yesterday felt bad. Increased swelling.    Concern for SI " preeclampsia. Discussed risks of preeclampsia and how this may alter delivery timing. Reviewed maternal and fetal risks of preeclampsia. If no signs/symptoms of severe disease, delivery can be at 36-37 weeks as previously reviewed with patient. If any signs/symptoms of severe disease from this point forward, would recommend delivery. Would not recommend additional titration of anti-hypertensives in an effort to achieve a more advanced gestational age at this point.     If discharged from OB ED, will need to continue 2x/ week PNT until delivery.    Discussed late  steroids including risks and benefits. If delivery is anticipated between 34-35 weeks and glucose control remains similar to present, would be reasonable to administer. Would not recommend administration of late  steroids after 36 weeks given concurrent GDM diagnosis.    D/w Dr. Boecking and Dr. Underwood.    Diet controlled gestational diabetes mellitus (GDM) in third trimester  Has not yet had GDM education.  Discussed goal BS (< 95 for fasting, < 120 for 2 hours post-meal).  Discussed 30-45 gm of carbs with breakfast, 45-60 gm with lunch and dinner. 15-30 gm for mid-morning, mid-afternoon, and bedtime snack.  Recorded blood sugars are normal or mildly elevated. Not yet enough recorded to determine if needs meds (insulin or metformin).  Given BS log. Encourage 4x/day blood sugar checks.  Will need fasting blood sugar checked on PPD1, 2 hour GCT 6-8 weeks post delivery.  Full consult deferred at this time given need for more blood sugar patterning, but did discuss maternal and fetal risks of GDM.  The patient was counseled on the risks of diabetes in pregnancy, including the risk of macrosomia, stillbirth in setting of uncontrolled DM, hypertensive disorders,  delivery, shoulder dystocia,  hyperbilirubinemia and  hypoglycemia, NICU stay, and potential TTN/respiratory distress of the .  Recommend growth US within 3  weeks of delivery; may need to be done on L&D depending on timing of delivery. Please notify us if outpatient ultrasound is desired prior to delivery.        F/u in 1 weeks for MFM visit  D/w Dr. Amaro

## 2022-05-26 NOTE — ASSESSMENT & PLAN NOTE
Has not yet had GDM education.  Discussed goal BS (< 95 for fasting, < 120 for 2 hours post-meal).  Discussed 30-45 gm of carbs with breakfast, 45-60 gm with lunch and dinner. 15-30 gm for mid-morning, mid-afternoon, and bedtime snack.  Recorded blood sugars are normal or mildly elevated. Not yet enough recorded to determine if needs meds (insulin or metformin).  Given BS log. Encourage 4x/day blood sugar checks.  Will need fasting blood sugar checked on PPD1, 2 hour GCT 6-8 weeks post delivery.  Full consult deferred at this time given need for more blood sugar patterning, but did discuss maternal and fetal risks of GDM.  The patient was counseled on the risks of diabetes in pregnancy, including the risk of macrosomia, stillbirth in setting of uncontrolled DM, hypertensive disorders,  delivery, shoulder dystocia,  hyperbilirubinemia and  hypoglycemia, NICU stay, and potential TTN/respiratory distress of the .  Recommend growth US within 3 weeks of delivery; may need to be done on L&D depending on timing of delivery. Please notify us if outpatient ultrasound is desired prior to delivery.

## 2022-05-26 NOTE — ED PROVIDER NOTES
Encounter Date: 2022       History     Chief Complaint   Patient presents with    Hypertension     HPI   Cherrie Wells is a 32 y.o. F at 34w0d presents from [facility] clinic for elevated BP and evaluation in the setting of documented severe range pressures on previous visits  This IUP is complicated by cHTN (on Procardida 90XL, Labetalol 200 BID), and A1GDM.  Patient denies contractions, vaginal bleeding, nor LOF. She denies SOB/CP/HA/RUQ pain/visual disturbance.   Fetal Movement: normal.     Review of patient's allergies indicates:  No Known Allergies  Past Medical History:   Diagnosis Date    Hypertension     Substance abuse      Past Surgical History:   Procedure Laterality Date    ANKLE SURGERY Left 2003    TONSILLECTOMY       Family History   Problem Relation Age of Onset    Breast cancer Paternal Grandmother     Diabetes Father     Hypertension Father     Colon cancer Neg Hx     Ovarian cancer Neg Hx      Social History     Tobacco Use    Smoking status: Former Smoker    Smokeless tobacco: Never Used   Substance Use Topics    Alcohol use: Not Currently     Comment: social    Drug use: No     Review of Systems   Constitutional: Negative for chills, fatigue and fever.   Eyes: Negative for visual disturbance.   Respiratory: Negative for shortness of breath.    Cardiovascular: Negative for chest pain and leg swelling.   Gastrointestinal: Negative for abdominal pain, nausea and vomiting.   Genitourinary: Negative for dysuria, vaginal bleeding, vaginal discharge and vaginal pain.   Neurological: Negative for light-headedness and headaches.   Psychiatric/Behavioral: Negative for confusion.       Physical Exam     Initial Vitals   BP Pulse Resp Temp SpO2   22 1147 22 1147 22 1314 22 1147 22 1147   (!) 136/91 82 16 97.1 °F (36.2 °C) 99 %      MAP       --                Physical Exam    Constitutional: She appears well-developed and well-nourished.   HENT:   Head:  Normocephalic and atraumatic.   Eyes: Conjunctivae and EOM are normal.   Neck: Neck supple.   Normal range of motion.  Cardiovascular: Normal rate and regular rhythm.   Pulmonary/Chest: Breath sounds normal.   Abdominal: Abdomen is soft. She exhibits no distension. There is no abdominal tenderness.   gravid There is no rebound and no guarding.   Genitourinary:    Uterus normal.     Musculoskeletal:         General: No tenderness or edema. Normal range of motion.      Cervical back: Normal range of motion and neck supple.     Neurological: She is alert and oriented to person, place, and time. She has normal strength and normal reflexes.   Skin: Skin is warm and dry. No erythema.   Psychiatric: She has a normal mood and affect. Thought content normal.         ED Course   Obtain Fetal nonstress test (NST)    Date/Time: 5/26/2022 3:54 PM  Performed by: Katherine C Boecking, MD  Authorized by: Yazmin Underwood MD     Nonstress Test:     Variability:  6-25 BPM    Decelerations:  None    Accelerations:  15 bpm    Baseline:  135    Uterine Irritability: No      Contractions:  Not present  Biophysical Profile:     Nonstress Test Interpretation: reactive      Overall Impression:  Reassuring  Post-procedure:     Patient tolerance:  Patient tolerated the procedure well with no immediate complications      Labs Reviewed   COMPREHENSIVE METABOLIC PANEL - Abnormal; Notable for the following components:       Result Value    CO2 21 (*)     Albumin 2.7 (*)     Alkaline Phosphatase 179 (*)     All other components within normal limits   CBC W/ AUTO DIFFERENTIAL - Abnormal; Notable for the following components:    RBC 3.76 (*)     Hemoglobin 11.8 (*)     Hematocrit 34.9 (*)     MCH 31.4 (*)     Immature Granulocytes 1.5 (*)     Immature Grans (Abs) 0.15 (*)     All other components within normal limits   PROTEIN / CREATININE RATIO, URINE - Abnormal; Notable for the following components:    Protein, Urine Random 47 (*)     Prot/Creat  Ratio, Urine 0.31 (*)     All other components within normal limits    Narrative:     Specimen Source->Urine          Imaging Results    None          Medications - No data to display  Medical Decision Making:   ED Management:  Mild range Bps in [facility]  Asymptomatic. PE unremarkable.  CBC/CMP wnl  P:Cr 0.31  Patient now meets criteria for superimposed PreE withOUT SF  Discussed with MFM, given A1GDM, BMZ not indicated at this time  Counseled patient on diagnosis. Strict precautions given to return to [facility]. Patient has follow up with primary OB provider tomorrow.    Katherine Boecking MD   Ob/Gyn PGY-2    Other:   I discussed test(s) with the performing physician.            Attending Attestation:   Physician Attestation Statement for Resident:  As the supervising MD   Physician Attestation Statement: I have personally seen and examined this patient.   I agree with the above history. -:   As the supervising MD I agree with the above PE.    As the supervising MD I agree with the above treatment, course, plan, and disposition.  I was personally present during the critical portions of the procedure(s) performed by the resident and was immediately available in the ED to provide services and assistance as needed during the entire procedure.  I have reviewed and agree with the residents interpretation of the following: lab data.  I have reviewed the following: old records at this facility.                         Clinical Impression:   Final diagnoses:  [O11.9] Pre-eclampsia superimposed on chronic hypertension (Primary)  [Z3A.34] 34 weeks gestation of pregnancy          ED Disposition Condition    Discharge Stable        ED Prescriptions     None        Follow-up Information    None          Katherine C Boecking, MD  Resident  05/26/22 [id number]       Yazmin Underwood MD  05/26/22 5571

## 2022-05-26 NOTE — ASSESSMENT & PLAN NOTE
Right renal pelvis measured 5.3 (normal) - 9 mm.  Given variation and previously noted pyelectasis, advise  renal ultrasound.  All imaging has been low risk (A1 UTD) to date.

## 2022-05-26 NOTE — ASSESSMENT & PLAN NOTE
BP today 139-156/101-106.  Meds: Procardia 90 mg XL, labetalol 200 mg BID, aspirin 81 mg  Compliant with meds. Asymptomatic today but yesterday felt bad. Increased swelling.    Concern for SI preeclampsia. Discussed risks of preeclampsia and how this may alter delivery timing. Reviewed maternal and fetal risks of preeclampsia. If no signs/symptoms of severe disease, delivery can be at 36-37 weeks as previously reviewed with patient. If any signs/symptoms of severe disease from this point forward, would recommend delivery. Would not recommend additional titration of anti-hypertensives in an effort to achieve a more advanced gestational age at this point.     If discharged from OB ED, will need to continue 2x/ week PNT until delivery.    Discussed late  steroids including risks and benefits. If delivery is anticipated between 34-35 weeks and glucose control remains similar to present, would be reasonable to administer. Would not recommend administration of late  steroids after 36 weeks given concurrent GDM diagnosis.    D/w Dr. Boecking and Dr. Underwood.

## 2022-05-27 ENCOUNTER — TELEPHONE (OUTPATIENT)
Dept: OBSTETRICS AND GYNECOLOGY | Facility: CLINIC | Age: 32
End: 2022-05-27

## 2022-05-27 ENCOUNTER — ROUTINE PRENATAL (OUTPATIENT)
Dept: OBSTETRICS AND GYNECOLOGY | Facility: CLINIC | Age: 32
End: 2022-05-27
Attending: STUDENT IN AN ORGANIZED HEALTH CARE EDUCATION/TRAINING PROGRAM
Payer: COMMERCIAL

## 2022-05-27 VITALS
DIASTOLIC BLOOD PRESSURE: 95 MMHG | SYSTOLIC BLOOD PRESSURE: 142 MMHG | BODY MASS INDEX: 34.58 KG/M2 | WEIGHT: 189.06 LBS

## 2022-05-27 DIAGNOSIS — O10.919 CHRONIC HYPERTENSION AFFECTING PREGNANCY: Primary | ICD-10-CM

## 2022-05-27 DIAGNOSIS — O24.419 GESTATIONAL DIABETES MELLITUS (GDM) IN THIRD TRIMESTER, GESTATIONAL DIABETES METHOD OF CONTROL UNSPECIFIED: ICD-10-CM

## 2022-05-27 LAB
CREAT UR-MCNC: 344 MG/DL (ref 15–325)
PROT UR-MCNC: 92 MG/DL (ref 0–15)
PROT/CREAT UR: 0.27 MG/G{CREAT} (ref 0–0.2)

## 2022-05-27 PROCEDURE — 0502F PR SUBSEQUENT PRENATAL CARE: ICD-10-PCS | Mod: CPTII,S$GLB,, | Performed by: STUDENT IN AN ORGANIZED HEALTH CARE EDUCATION/TRAINING PROGRAM

## 2022-05-27 PROCEDURE — 0502F SUBSEQUENT PRENATAL CARE: CPT | Mod: CPTII,S$GLB,, | Performed by: STUDENT IN AN ORGANIZED HEALTH CARE EDUCATION/TRAINING PROGRAM

## 2022-05-27 PROCEDURE — 99999 PR PBB SHADOW E&M-EST. PATIENT-LVL III: CPT | Mod: PBBFAC,,, | Performed by: STUDENT IN AN ORGANIZED HEALTH CARE EDUCATION/TRAINING PROGRAM

## 2022-05-27 PROCEDURE — 99999 PR PBB SHADOW E&M-EST. PATIENT-LVL III: ICD-10-PCS | Mod: PBBFAC,,, | Performed by: STUDENT IN AN ORGANIZED HEALTH CARE EDUCATION/TRAINING PROGRAM

## 2022-05-27 PROCEDURE — 82570 ASSAY OF URINE CREATININE: CPT | Performed by: STUDENT IN AN ORGANIZED HEALTH CARE EDUCATION/TRAINING PROGRAM

## 2022-05-27 NOTE — PROGRESS NOTES
Doing well.  No complaints.  Reports fetal movement.  Denies contractions, leakage of fluid, vaginal bleeding.  No HA, vision changes, CP, SOB, palpitations.  PreE labs yesterday.  Will repeat urine.  ED precautions reviewed.  Continue twice weekly testing.  IOL at 36 weeks.  Consents signed.  All questions answered.  RTC as scheduled or sooner if needed.

## 2022-05-27 NOTE — TELEPHONE ENCOUNTER
Induction requested 6/10 at 0000 with instruction to move to 6/9 if someone delivers.     Vaccinated

## 2022-05-27 NOTE — TELEPHONE ENCOUNTER
----- Message from Orquidea Amaro MD sent at 5/27/2022 12:49 PM CDT -----  Regarding: IOL  Please schedule Iol for 6/10 at [facility] with note to move to 6/9 if anything opens up.  Thank you!  She is 0/50/-3.  IOL at 36 weeks for PreE and gestational diabetes.

## 2022-06-01 ENCOUNTER — PATIENT MESSAGE (OUTPATIENT)
Dept: MATERNAL FETAL MEDICINE | Facility: CLINIC | Age: 32
End: 2022-06-01
Payer: COMMERCIAL

## 2022-06-01 ENCOUNTER — PATIENT MESSAGE (OUTPATIENT)
Dept: OBSTETRICS AND GYNECOLOGY | Facility: CLINIC | Age: 32
End: 2022-06-01
Payer: COMMERCIAL

## 2022-06-02 ENCOUNTER — HOSPITAL ENCOUNTER (INPATIENT)
Facility: OTHER | Age: 32
LOS: 6 days | Discharge: HOME | End: 2022-06-08
Attending: OBSTETRICS & GYNECOLOGY | Admitting: OBSTETRICS & GYNECOLOGY
Payer: COMMERCIAL

## 2022-06-02 ENCOUNTER — PATIENT MESSAGE (OUTPATIENT)
Dept: OBSTETRICS AND GYNECOLOGY | Facility: CLINIC | Age: 32
End: 2022-06-02
Payer: COMMERCIAL

## 2022-06-02 ENCOUNTER — ANESTHESIA EVENT (OUTPATIENT)
Dept: OBSTETRICS AND GYNECOLOGY | Facility: OTHER | Age: 32
End: 2022-06-02
Payer: COMMERCIAL

## 2022-06-02 ENCOUNTER — ANESTHESIA (OUTPATIENT)
Dept: OBSTETRICS AND GYNECOLOGY | Facility: OTHER | Age: 32
End: 2022-06-02
Payer: COMMERCIAL

## 2022-06-02 DIAGNOSIS — O36.8390 FETAL HEART RATE NON-REASSURING AFFECTING MANAGEMENT OF MOTHER: Primary | ICD-10-CM

## 2022-06-02 DIAGNOSIS — O11.9 CHRONIC HYPERTENSION WITH SUPERIMPOSED PRE-ECLAMPSIA: ICD-10-CM

## 2022-06-02 DIAGNOSIS — Z98.891 STATUS POST PRIMARY LOW TRANSVERSE CESAREAN SECTION: ICD-10-CM

## 2022-06-02 PROBLEM — O14.13 SEVERE PRE-ECLAMPSIA IN THIRD TRIMESTER: Status: ACTIVE | Noted: 2022-06-02

## 2022-06-02 LAB
ABO + RH BLD: NORMAL
ALBUMIN SERPL BCP-MCNC: 2.5 G/DL (ref 3.5–5.2)
ALP SERPL-CCNC: 181 U/L (ref 55–135)
ALT SERPL W/O P-5'-P-CCNC: 17 U/L (ref 10–44)
ANION GAP SERPL CALC-SCNC: 11 MMOL/L (ref 8–16)
AST SERPL-CCNC: 19 U/L (ref 10–40)
BASOPHILS # BLD AUTO: 0.02 K/UL (ref 0–0.2)
BASOPHILS NFR BLD: 0.3 % (ref 0–1.9)
BILIRUB SERPL-MCNC: 0.3 MG/DL (ref 0.1–1)
BLD GP AB SCN CELLS X3 SERPL QL: NORMAL
BUN SERPL-MCNC: 11 MG/DL (ref 6–20)
CALCIUM SERPL-MCNC: 9.2 MG/DL (ref 8.7–10.5)
CHLORIDE SERPL-SCNC: 106 MMOL/L (ref 95–110)
CO2 SERPL-SCNC: 19 MMOL/L (ref 23–29)
CREAT SERPL-MCNC: 0.6 MG/DL (ref 0.5–1.4)
CREAT UR-MCNC: 71.5 MG/DL (ref 15–325)
DIFFERENTIAL METHOD: ABNORMAL
EOSINOPHIL # BLD AUTO: 0.2 K/UL (ref 0–0.5)
EOSINOPHIL NFR BLD: 2.1 % (ref 0–8)
ERYTHROCYTE [DISTWIDTH] IN BLOOD BY AUTOMATED COUNT: 12.7 % (ref 11.5–14.5)
EST. GFR  (AFRICAN AMERICAN): >60 ML/MIN/1.73 M^2
EST. GFR  (NON AFRICAN AMERICAN): >60 ML/MIN/1.73 M^2
GLUCOSE SERPL-MCNC: 119 MG/DL (ref 70–110)
HCT VFR BLD AUTO: 33.3 % (ref 37–48.5)
HGB BLD-MCNC: 11.4 G/DL (ref 12–16)
IMM GRANULOCYTES # BLD AUTO: 0.09 K/UL (ref 0–0.04)
IMM GRANULOCYTES NFR BLD AUTO: 1.1 % (ref 0–0.5)
LYMPHOCYTES # BLD AUTO: 1.6 K/UL (ref 1–4.8)
LYMPHOCYTES NFR BLD: 19.7 % (ref 18–48)
MCH RBC QN AUTO: 31.7 PG (ref 27–31)
MCHC RBC AUTO-ENTMCNC: 34.2 G/DL (ref 32–36)
MCV RBC AUTO: 93 FL (ref 82–98)
MONOCYTES # BLD AUTO: 0.5 K/UL (ref 0.3–1)
MONOCYTES NFR BLD: 6.4 % (ref 4–15)
NEUTROPHILS # BLD AUTO: 5.6 K/UL (ref 1.8–7.7)
NEUTROPHILS NFR BLD: 70.4 % (ref 38–73)
NRBC BLD-RTO: 0 /100 WBC
PLATELET # BLD AUTO: 186 K/UL (ref 150–450)
PMV BLD AUTO: 10.8 FL (ref 9.2–12.9)
POCT GLUCOSE: 65 MG/DL (ref 70–110)
POCT GLUCOSE: 80 MG/DL (ref 70–110)
POTASSIUM SERPL-SCNC: 4.3 MMOL/L (ref 3.5–5.1)
PROT SERPL-MCNC: 6 G/DL (ref 6–8.4)
PROT UR-MCNC: 27 MG/DL (ref 0–15)
PROT/CREAT UR: 0.38 MG/G{CREAT} (ref 0–0.2)
RBC # BLD AUTO: 3.6 M/UL (ref 4–5.4)
SODIUM SERPL-SCNC: 136 MMOL/L (ref 136–145)
WBC # BLD AUTO: 7.95 K/UL (ref 3.9–12.7)

## 2022-06-02 PROCEDURE — 82570 ASSAY OF URINE CREATININE: CPT

## 2022-06-02 PROCEDURE — 11000001 HC ACUTE MED/SURG PRIVATE ROOM

## 2022-06-02 PROCEDURE — 99285 PR EMERGENCY DEPT VISIT,LEVEL V: ICD-10-PCS | Mod: 25,,, | Performed by: OBSTETRICS & GYNECOLOGY

## 2022-06-02 PROCEDURE — 63600175 PHARM REV CODE 636 W HCPCS: Performed by: STUDENT IN AN ORGANIZED HEALTH CARE EDUCATION/TRAINING PROGRAM

## 2022-06-02 PROCEDURE — 25000003 PHARM REV CODE 250: Performed by: STUDENT IN AN ORGANIZED HEALTH CARE EDUCATION/TRAINING PROGRAM

## 2022-06-02 PROCEDURE — 59025 PR FETAL 2N-STRESS TEST: ICD-10-PCS | Mod: 26,,, | Performed by: OBSTETRICS & GYNECOLOGY

## 2022-06-02 PROCEDURE — 86850 RBC ANTIBODY SCREEN: CPT | Performed by: STUDENT IN AN ORGANIZED HEALTH CARE EDUCATION/TRAINING PROGRAM

## 2022-06-02 PROCEDURE — 59025 FETAL NON-STRESS TEST: CPT

## 2022-06-02 PROCEDURE — 59025 FETAL NON-STRESS TEST: CPT | Mod: 26,,, | Performed by: OBSTETRICS & GYNECOLOGY

## 2022-06-02 PROCEDURE — 99285 EMERGENCY DEPT VISIT HI MDM: CPT | Mod: 25

## 2022-06-02 PROCEDURE — 25000003 PHARM REV CODE 250

## 2022-06-02 PROCEDURE — 80053 COMPREHEN METABOLIC PANEL: CPT

## 2022-06-02 PROCEDURE — 59200 INSERT CERVICAL DILATOR: CPT

## 2022-06-02 PROCEDURE — 99285 EMERGENCY DEPT VISIT HI MDM: CPT | Mod: 25,,, | Performed by: OBSTETRICS & GYNECOLOGY

## 2022-06-02 PROCEDURE — 85025 COMPLETE CBC W/AUTO DIFF WBC: CPT

## 2022-06-02 RX ORDER — LABETALOL HYDROCHLORIDE 5 MG/ML
40 INJECTION, SOLUTION INTRAVENOUS ONCE
Status: COMPLETED | OUTPATIENT
Start: 2022-06-02 | End: 2022-06-02

## 2022-06-02 RX ORDER — PROCHLORPERAZINE EDISYLATE 5 MG/ML
5 INJECTION INTRAMUSCULAR; INTRAVENOUS EVERY 6 HOURS PRN
Status: DISCONTINUED | OUTPATIENT
Start: 2022-06-02 | End: 2022-06-04 | Stop reason: SDUPTHER

## 2022-06-02 RX ORDER — TRANEXAMIC ACID 100 MG/ML
1000 INJECTION, SOLUTION INTRAVENOUS ONCE AS NEEDED
Status: DISCONTINUED | OUTPATIENT
Start: 2022-06-02 | End: 2022-06-08 | Stop reason: HOSPADM

## 2022-06-02 RX ORDER — LABETALOL HYDROCHLORIDE 5 MG/ML
20 INJECTION, SOLUTION INTRAVENOUS ONCE
Status: COMPLETED | OUTPATIENT
Start: 2022-06-02 | End: 2022-06-02

## 2022-06-02 RX ORDER — LIDOCAINE HYDROCHLORIDE 10 MG/ML
10 INJECTION INFILTRATION; PERINEURAL ONCE AS NEEDED
Status: DISCONTINUED | OUTPATIENT
Start: 2022-06-02 | End: 2022-06-04

## 2022-06-02 RX ORDER — CALCIUM CARBONATE 200(500)MG
500 TABLET,CHEWABLE ORAL 3 TIMES DAILY PRN
Status: DISCONTINUED | OUTPATIENT
Start: 2022-06-02 | End: 2022-06-08 | Stop reason: HOSPADM

## 2022-06-02 RX ORDER — MAGNESIUM SULFATE HEPTAHYDRATE 40 MG/ML
4 INJECTION, SOLUTION INTRAVENOUS ONCE
Status: COMPLETED | OUTPATIENT
Start: 2022-06-02 | End: 2022-06-02

## 2022-06-02 RX ORDER — NIFEDIPINE 30 MG/1
90 TABLET, EXTENDED RELEASE ORAL DAILY
Status: DISCONTINUED | OUTPATIENT
Start: 2022-06-03 | End: 2022-06-08 | Stop reason: HOSPADM

## 2022-06-02 RX ORDER — SODIUM CHLORIDE, SODIUM LACTATE, POTASSIUM CHLORIDE, CALCIUM CHLORIDE 600; 310; 30; 20 MG/100ML; MG/100ML; MG/100ML; MG/100ML
INJECTION, SOLUTION INTRAVENOUS CONTINUOUS
Status: DISCONTINUED | OUTPATIENT
Start: 2022-06-02 | End: 2022-06-08 | Stop reason: HOSPADM

## 2022-06-02 RX ORDER — SODIUM CHLORIDE, SODIUM LACTATE, POTASSIUM CHLORIDE, CALCIUM CHLORIDE 600; 310; 30; 20 MG/100ML; MG/100ML; MG/100ML; MG/100ML
INJECTION, SOLUTION INTRAVENOUS CONTINUOUS
Status: DISCONTINUED | OUTPATIENT
Start: 2022-06-02 | End: 2022-06-02

## 2022-06-02 RX ORDER — OXYTOCIN/RINGER'S LACTATE 30/500 ML
0-30 PLASTIC BAG, INJECTION (ML) INTRAVENOUS CONTINUOUS
Status: DISCONTINUED | OUTPATIENT
Start: 2022-06-03 | End: 2022-06-04

## 2022-06-02 RX ORDER — MAGNESIUM SULFATE HEPTAHYDRATE 40 MG/ML
2 INJECTION, SOLUTION INTRAVENOUS CONTINUOUS
Status: DISCONTINUED | OUTPATIENT
Start: 2022-06-02 | End: 2022-06-04

## 2022-06-02 RX ORDER — GUAIFENESIN 600 MG/1
600 TABLET, EXTENDED RELEASE ORAL ONCE
Status: COMPLETED | OUTPATIENT
Start: 2022-06-02 | End: 2022-06-02

## 2022-06-02 RX ORDER — OXYTOCIN/RINGER'S LACTATE 30/500 ML
95 PLASTIC BAG, INJECTION (ML) INTRAVENOUS ONCE
Status: COMPLETED | OUTPATIENT
Start: 2022-06-02 | End: 2022-06-03

## 2022-06-02 RX ORDER — LABETALOL 200 MG/1
200 TABLET, FILM COATED ORAL 2 TIMES DAILY
Status: DISCONTINUED | OUTPATIENT
Start: 2022-06-02 | End: 2022-06-06

## 2022-06-02 RX ORDER — ESCITALOPRAM OXALATE 10 MG/1
20 TABLET ORAL DAILY
Status: DISCONTINUED | OUTPATIENT
Start: 2022-06-02 | End: 2022-06-08 | Stop reason: HOSPADM

## 2022-06-02 RX ORDER — ONDANSETRON 8 MG/1
8 TABLET, ORALLY DISINTEGRATING ORAL EVERY 8 HOURS PRN
Status: DISCONTINUED | OUTPATIENT
Start: 2022-06-02 | End: 2022-06-04 | Stop reason: SDUPTHER

## 2022-06-02 RX ORDER — SODIUM CHLORIDE 9 MG/ML
INJECTION, SOLUTION INTRAVENOUS
Status: DISCONTINUED | OUTPATIENT
Start: 2022-06-02 | End: 2022-06-04

## 2022-06-02 RX ORDER — SIMETHICONE 80 MG
1 TABLET,CHEWABLE ORAL 4 TIMES DAILY PRN
Status: DISCONTINUED | OUTPATIENT
Start: 2022-06-02 | End: 2022-06-04

## 2022-06-02 RX ORDER — OXYTOCIN/RINGER'S LACTATE 30/500 ML
334 PLASTIC BAG, INJECTION (ML) INTRAVENOUS ONCE
Status: DISCONTINUED | OUTPATIENT
Start: 2022-06-02 | End: 2022-06-04

## 2022-06-02 RX ORDER — CALCIUM GLUCONATE 98 MG/ML
1 INJECTION, SOLUTION INTRAVENOUS
Status: DISCONTINUED | OUTPATIENT
Start: 2022-06-02 | End: 2022-06-08 | Stop reason: HOSPADM

## 2022-06-02 RX ADMIN — MISOPROSTOL 50 MCG: 100 TABLET ORAL at 07:06

## 2022-06-02 RX ADMIN — SODIUM CHLORIDE, SODIUM LACTATE, POTASSIUM CHLORIDE, AND CALCIUM CHLORIDE 1000 ML: .6; .31; .03; .02 INJECTION, SOLUTION INTRAVENOUS at 02:06

## 2022-06-02 RX ADMIN — MISOPROSTOL 50 MCG: 100 TABLET ORAL at 03:06

## 2022-06-02 RX ADMIN — LABETALOL HYDROCHLORIDE 20 MG: 5 INJECTION INTRAVENOUS at 04:06

## 2022-06-02 RX ADMIN — LABETALOL HYDROCHLORIDE 20 MG: 5 INJECTION INTRAVENOUS at 06:06

## 2022-06-02 RX ADMIN — MAGNESIUM SULFATE HEPTAHYDRATE 4 G: 40 INJECTION, SOLUTION INTRAVENOUS at 02:06

## 2022-06-02 RX ADMIN — MAGNESIUM SULFATE IN WATER 2 G/HR: 40 INJECTION, SOLUTION INTRAVENOUS at 02:06

## 2022-06-02 RX ADMIN — LABETALOL HYDROCHLORIDE 40 MG: 5 INJECTION INTRAVENOUS at 07:06

## 2022-06-02 RX ADMIN — LABETALOL HYDROCHLORIDE 200 MG: 200 TABLET, FILM COATED ORAL at 08:06

## 2022-06-02 RX ADMIN — GUAIFENESIN 600 MG: 600 TABLET, EXTENDED RELEASE ORAL at 03:06

## 2022-06-02 NOTE — CARE UPDATE
MD to bedside for motley balloon placement. Motley balloon placed without difficulty. Plan to reassess for another dose of cytotec vs pitocin at 1915. FHT category 1, baseline 130, moderate BTBV, + accels, no decels.    Brittny Morton MD  PGY-1 OB/GYN

## 2022-06-02 NOTE — TELEPHONE ENCOUNTER
35 week OB states she is having ctx.  Not painful.  Feel like period cramps.  Unable to time them because they feel like a wave.  The other day had cramping and diarrhea, bright red spotting when wiping x1.  Diarrhea and spotting subsided.  Thinks she is well hydrated.  Advised with the diarrhea she may be a little dehydrated.  Positive fetal movement and membranes are intact.  Recommended she rest and drink 1-2 bottles of water but if no improvement she should go to the [facility].  She will write on the portal with an update in 1-2 hours.

## 2022-06-02 NOTE — ANESTHESIA PREPROCEDURE EVALUATION
[facility]  Anesthesia Pre-Operative Evaluation         Patient Name: Cherrie Wells  YOB: 1990  MRN: [id number]    2022      Cherrie Wells is a 32 y.o. female  @ 35w0d who presents to [facility] with contractions. In [facility] she was found to have severe range blood pressures. Patient previously diagnosed without severe features, now meeting criteria for Pre E WITH SF. Decision was made to admit for MgSO4 ppx and IOL.    This IUP is complicated by cHTN with SI Pre E with SF (on Procardia 90 and Labetalol 200 BID),  A1GDM, mood disorder.     Patient has had no personal/family hx of issues with anesthesia in past.        OB History    Para Term  AB Living   1             SAB IAB Ectopic Multiple Live Births                  # Outcome Date GA Lbr Live/2nd Weight Sex Delivery Anes PTL Lv   1 Current               Obstetric Comments   Menarche age 11       Review of patient's allergies indicates:  No Known Allergies    Wt Readings from Last 1 Encounters:   22 1413 85.7 kg (189 lb)       BP Readings from Last 3 Encounters:   22 (!) 147/92   22 (!) 142/95   22 (!) 141/92       Patient Active Problem List   Diagnosis    Hypertension affecting pregnancy in second trimester    Pyelectasis of fetus on prenatal ultrasound    Chronic hypertension    Diet controlled gestational diabetes mellitus (GDM) in third trimester    Severe pre-eclampsia in third trimester    Chronic hypertension with superimposed pre-eclampsia       Past Surgical History:   Procedure Laterality Date    ANKLE SURGERY Left     TONSILLECTOMY         Social History     Socioeconomic History    Marital status: Single   Tobacco Use    Smoking status: Former Smoker    Smokeless tobacco: Never Used   Substance and Sexual Activity    Alcohol use: Not Currently     Comment: social    Drug use: No    Sexual activity: Yes     Partners: Male     Birth  control/protection: None         Chemistry        Component Value Date/Time     06/02/2022 1219    K 4.3 06/02/2022 1219     06/02/2022 1219    CO2 19 (L) 06/02/2022 1219    BUN 11 06/02/2022 1219    CREATININE 0.6 06/02/2022 1219     (H) 06/02/2022 1219        Component Value Date/Time    CALCIUM 9.2 06/02/2022 1219    ALKPHOS 181 (H) 06/02/2022 1219    AST 19 06/02/2022 1219    ALT 17 06/02/2022 1219    BILITOT 0.3 06/02/2022 1219    ESTGFRAFRICA >60 06/02/2022 1219    EGFRNONAA >60 06/02/2022 1219            Lab Results   Component Value Date    WBC 7.95 06/02/2022    HGB 11.4 (L) 06/02/2022    HCT 33.3 (L) 06/02/2022    MCV 93 06/02/2022     06/02/2022       No results for input(s): PT, INR, PROTIME, APTT in the last 72 hours.          Pre-op Assessment    I have reviewed the Patient Summary Reports.     I have reviewed the Nursing Notes.    I have reviewed the Medications.     Review of Systems  Anesthesia Hx:  No problems with previous Anesthesia  History of prior surgery of interest to airway management or planning: Denies Family Hx of Anesthesia complications.   Denies Personal Hx of Anesthesia complications.   EENT/Dental:EENT/Dental Normal   Cardiovascular:   Hypertension    Pulmonary:  Pulmonary Normal    Renal/:  Renal/ Normal     Hepatic/GI:  Hepatic/GI Normal    Musculoskeletal:  Musculoskeletal Normal    Neurological:  Neurology Normal    Endocrine:   Diabetes (Gestational)    Psych:   Psychiatric History          Physical Exam  General: Well nourished, Cooperative and Alert    Airway:  Mallampati: I   Mouth Opening: Normal  TM Distance: Normal  Tongue: Normal  Neck ROM: Normal ROM    Dental:  Intact    Chest/Lungs:  Normal Respiratory Rate    Heart:  Rate: Normal        Anesthesia Plan  Type of Anesthesia, risks & benefits discussed:    Anesthesia Type: Epidural, Gen ETT, CSE, Spinal  Intra-op Monitoring Plan: Standard ASA Monitors  Post Op Pain Control Plan: multimodal  analgesia, IV/PO Opioids PRN and epidural analgesia  Informed Consent: Informed consent signed with the Patient and all parties understand the risks and agree with anesthesia plan.  All questions answered.   ASA Score: 3  Day of Surgery Review of History & Physical: H&P Update referred to the surgeon/provider.    Ready For Surgery From Anesthesia Perspective.     .

## 2022-06-02 NOTE — ED PROVIDER NOTES
Encounter Date: 2022       History     Chief Complaint   Patient presents with    Contractions     HPI   Cherrie Wells is a 32 y.o.  at 35w0d presents complaining of cramping/CTX.     This IUP is complicated by resolved low lying placenta (sched IOL 36w), cHTN (on Procardida 90XL, Labetalol 200 BID), A1GDM, h/o opioid abuse (not currently on suboxone), fetal renal pyelectasis.  Patient reports contractions, denies vaginal bleeding, denies LOF.   Fetal Movement: normal.       Review of patient's allergies indicates:  No Known Allergies  Past Medical History:   Diagnosis Date    Hypertension     Substance abuse      Past Surgical History:   Procedure Laterality Date    ANKLE SURGERY Left     TONSILLECTOMY       Family History   Problem Relation Age of Onset    Breast cancer Paternal Grandmother     Diabetes Father     Hypertension Father     Colon cancer Neg Hx     Ovarian cancer Neg Hx      Social History     Tobacco Use    Smoking status: Former Smoker    Smokeless tobacco: Never Used   Substance Use Topics    Alcohol use: Not Currently     Comment: social    Drug use: No     Review of Systems   Constitutional: Negative for fever.   HENT: Negative for sore throat.    Respiratory: Negative for shortness of breath.    Cardiovascular: Negative for chest pain.   Gastrointestinal: Positive for abdominal pain. Negative for nausea.   Genitourinary: Negative for dysuria.   Musculoskeletal: Negative for back pain.   Skin: Negative for rash.   Neurological: Negative for weakness.   Hematological: Does not bruise/bleed easily.       Physical Exam     Initial Vitals   BP Pulse Resp Temp SpO2   22 1149 22 1148 22 1500 22 1218 22 1151   (!) 148/105 82 18 98.3 °F (36.8 °C) 98 %      MAP       --                Physical Exam    Constitutional: She appears well-developed and well-nourished. No distress.   HENT:   Head: Normocephalic and atraumatic.   Neck:   Normal  range of motion.  Cardiovascular: Normal rate and intact distal pulses.   Pulmonary/Chest: Breath sounds normal. No respiratory distress. She has no wheezes.   Abdominal: Abdomen is soft. She exhibits no distension. There is no abdominal tenderness. There is no rebound.   Musculoskeletal:         General: No edema. Normal range of motion.      Cervical back: Normal range of motion.     Neurological: She is alert and oriented to person, place, and time.   Skin: Skin is warm and dry. No rash noted. No erythema.   Psychiatric: She has a normal mood and affect. Her behavior is normal. Judgment and thought content normal.     OB LABOR EXAM:             Dilatation: 0.           Comments: Cl 50 -3        ED Course   Obtain Fetal nonstress test (NST)    Date/Time: 6/3/2022 11:22 AM  Performed by: Ro Cantu MD  Authorized by: Ro Cantu MD     Nonstress Test:     Variability:  6-25 BPM    Decelerations:  None    Accelerations:  15 bpm    Baseline:  140    Uterine Irritability: Yes      Contractions:  Irregular  Biophysical Profile:     Nonstress Test Interpretation: reactive      Overall Impression:  Reassuring      Labs Reviewed   CBC W/ AUTO DIFFERENTIAL - Abnormal; Notable for the following components:       Result Value    RBC 3.60 (*)     Hemoglobin 11.4 (*)     Hematocrit 33.3 (*)     MCH 31.7 (*)     Immature Granulocytes 1.1 (*)     Immature Grans (Abs) 0.09 (*)     All other components within normal limits   COMPREHENSIVE METABOLIC PANEL - Abnormal; Notable for the following components:    CO2 19 (*)     Glucose 119 (*)     Albumin 2.5 (*)     Alkaline Phosphatase 181 (*)     All other components within normal limits   PROTEIN / CREATININE RATIO, URINE - Abnormal; Notable for the following components:    Protein, Urine Random 27 (*)     Prot/Creat Ratio, Urine 0.38 (*)     All other components within normal limits    Narrative:     Specimen Source->Urine   POCT GLUCOSE MONITORING CONTINUOUS   POCT GLUCOSE  MONITORING CONTINUOUS          Imaging Results    None          Medications   EScitalopram oxalate tablet 20 mg (20 mg Oral Not Given 6/2/22 1445)   NIFEdipine 24 hr tablet 90 mg (has no administration in time range)   labetaloL tablet 200 mg (200 mg Oral Given 6/2/22 2000)   lactated ringers bolus 1,000 mL (has no administration in time range)   lactated ringers bolus 500 mL (has no administration in time range)   0.9%  NaCl infusion (has no administration in time range)   oxytocin 30 units in 500 mL lactated ringers infusion (non-titrating) (has no administration in time range)   oxytocin 30 units in 500 mL lactated ringers infusion (non-titrating) (has no administration in time range)   tranexamic acid (CYKLOKAPRON) 1,000 mg in sodium chloride 0.9 % 100 mL (ready to mix system) (has no administration in time range)   ondansetron disintegrating tablet 8 mg (has no administration in time range)   prochlorperazine injection Soln 5 mg (has no administration in time range)   calcium carbonate 200 mg calcium (500 mg) chewable tablet 500 mg (has no administration in time range)   simethicone chewable tablet 80 mg (has no administration in time range)   LIDOcaine HCL 10 mg/ml (1%) injection 10 mL (has no administration in time range)   magnesium sulfate in water 40 gram/1,000 mL (4 %) infusion (2 g/hr Intravenous Rate/Dose Change 6/3/22 1006)   calcium gluconate 100 mg/mL (10%) injection 1 g (has no administration in time range)   lactated ringers infusion (1,000 mLs Intravenous New Bag 6/3/22 1051)   oxytocin 30 units in 500 mL lactated ringers infusion (titrating) (8 sulaiman-units/min Intravenous Rate/Dose Change 6/3/22 1026)   BUPivacaine (PF) 0.25% (2.5 mg/ml) (MARCAINE) 0.25 % (2.5 mg/mL) injection (has no administration in time range)   magnesium sulfate in water 40 gram/1,000 mL (4 %) bolus from bag 4 g (4 g Intravenous Bolus from Bag 6/2/22 1400)   misoprostol split tablet 50 mcg (50 mcg Oral Given 6/2/22 1517)    guaiFENesin 12 hr tablet 600 mg (600 mg Oral Given 6/2/22 1546)   labetaloL injection 20 mg (20 mg Intravenous Given 6/2/22 1623)   labetaloL injection 20 mg (20 mg Intravenous Given 6/2/22 1818)   labetaloL injection 40 mg (40 mg Intravenous Given 6/2/22 1907)   misoprostol split tablet 50 mcg (50 mcg Oral Given 6/2/22 1930)   fentaNYL (SUBLIMAZE) 50 mcg/mL injection (  Override pull for Anesthesia 6/3/22 0802)   fentanyl 2mcg/mL with BUPivacaine 0.1% in sdoium chloride 0.9% Epidural 2 mcg/mL- 0.1 % Soln (  Override pull for Anesthesia 6/3/22 0802)     Medical Decision Making:   ED Management:  VSS > severe range BP > Labetalol 20/40/40   NST RR   SVE cl th h   Vertex by BSU   Pre E labs: PCR 0.38     Patient to be admitted for IOL due to JEREL w SF and receive Mg ppx.     Liza Cantu MD  PGY 1  Obstetrics and Gynecology            Attending Attestation:   Physician Attestation Statement for Resident:  As the supervising MD   Physician Attestation Statement: I have personally seen and examined this patient.   I agree with the above history. -:   As the supervising MD I agree with the above PE.    As the supervising MD I agree with the above treatment, course, plan, and disposition.  I was personally present during the critical portions of the procedure(s) performed by the resident and was immediately available in the ED to provide services and assistance as needed during the entire procedure.                         Clinical Impression:   Final diagnoses:  [O11.9] Chronic hypertension with superimposed pre-eclampsia          Ro Cantu MD  Resident  06/03/22 1126       Saloni Voss MD  06/03/22 1248

## 2022-06-02 NOTE — H&P
HISTORY AND PHYSICAL                                                OBSTETRICS          Subjective:       Cherrie Wells is a 32 y.o.  female with IUP at 35w0d weeks gestation who presented to [facility] with contractions. In [facility] she was found to have severe range blood pressures. Patient previously diagnosed without severe features, now meeting criteria for Pre E WITH SF. Decision was made to admit for MgSO4 ppx and IOL.    Patient reports contractions, denies vaginal bleeding, denies LOF.   Fetal Movement: normal.    This IUP is complicated by cHTN with SI Pre E with SF (on Procardia 90 and Labetalol 200 BID),  A1GDM, mood disorder.     Review of Systems   Constitutional: Negative for fever.   Respiratory: Negative for shortness of breath.    Cardiovascular: Negative for chest pain.   Gastrointestinal: Negative for abdominal pain, nausea and vomiting.   Endocrine: Negative for hot flashes.   Genitourinary: Negative for pelvic pain, vaginal bleeding and vaginal dryness.   Integumentary:  Negative for breast mass, nipple discharge and breast skin changes.   Neurological: Negative for headaches.   Hematological: Does not bruise/bleed easily.   Psychiatric/Behavioral: Negative for depression.   Breast: Negative for mass, mastodynia, nipple discharge and skin changes      PMHx:   Past Medical History:   Diagnosis Date    Hypertension     Substance abuse        PSHx:   Past Surgical History:   Procedure Laterality Date    ANKLE SURGERY Left     TONSILLECTOMY         All: Review of patient's allergies indicates:  No Known Allergies    Meds: (Not in a hospital admission)      SH:   Social History     Socioeconomic History    Marital status: Single   Tobacco Use    Smoking status: Former Smoker    Smokeless tobacco: Never Used   Substance and Sexual Activity    Alcohol use: Not Currently     Comment: social    Drug use: No    Sexual activity: Yes     Partners: Male     Birth control/protection: None        FH:   Family History   Problem Relation Age of Onset    Breast cancer Paternal Grandmother     Diabetes Father     Hypertension Father     Colon cancer Neg Hx     Ovarian cancer Neg Hx        OBHx:   OB History    Para Term  AB Living   1 0 0 0 0 0   SAB IAB Ectopic Multiple Live Births   0 0 0 0 0      # Outcome Date GA Lbr Live/2nd Weight Sex Delivery Anes PTL Lv   1 Current               Obstetric Comments   Menarche age [age]       Objective:       BP (!) 166/107   Pulse 92   Temp 98.3 °F (36.8 °C)   LMP 09/15/2021   SpO2 98%     Vitals:    22 1331 22 1333 22 1334 22 1335   BP:   (!) 166/107    Pulse: 87 87 90 92   Temp:       SpO2:  98%         General:   alert, appears stated age and cooperative, no apparent distress   HENT:  normocephalic, atraumatic   Eyes:  extraocular movements and conjunctivae normal   Neck:  supple, range of motion normal, no thyromegaly   Lungs:   no respiratory distress   Heart:   regular rate   Abdomen:  soft, non-tender, non-distended but gravid, no rebound or guarding    Extremities negative edema, negative erythema   FHT: 140, moderate BTBV, +accels, -decels;  Cat 1 (reassuring)                 TOCO: Uterine irritability    Presentations: cephalic by ultrasound   Cervix:     Dilation: Closed    Effacement: 50%    Station:  -3    Consistency: firm    Position: posterior     EFW by Leopold's: 6    Recent Growth Scan: 31wk EFW 50%, 2122g    Lab Review  Blood Type O POS  GBBS: negative  Rubella: Immune   RPR: NR (1T)  HIV: negative (1T)  HepB: negative        Assessment:       35w0d weeks gestation with IOL 2/2 to Pre E with SF.    Active Hospital Problems    Diagnosis  POA    *Chronic hypertension with superimposed pre-eclampsia [O11.9]  Yes    Severe pre-eclampsia in third trimester [O14.13]  Unknown      Resolved Hospital Problems   No resolved problems to display.          Plan:      Risks, benefits, alternatives and possible  complications have been discussed in detail with the patient.   - Consents signed and to chart  - Admit to Labor and Delivery unit  - Epidural per Anesthesia  - Notify Staff  - Recheck in 4 hrs or PRN  - EFW 3000g     cHTN w/JEREL w/SF  - BP as above  - asymptomatic  - preE labs as above  - MgSO4 ppx  - Continue Procardia 90 and Labetalol 200 BID    A1GDM  - BG q4h latent labor, q2h active  -  on admit    Mood disorder  - Continue home Lexapro    Post-Partum Hemorrhage risk - low    Katherine Boecking MD   Ob/Gyn PGY-2

## 2022-06-03 PROBLEM — Z98.891 STATUS POST PRIMARY LOW TRANSVERSE CESAREAN SECTION: Status: ACTIVE | Noted: 2022-06-03

## 2022-06-03 LAB
POCT GLUCOSE: 117 MG/DL (ref 70–110)
POCT GLUCOSE: 75 MG/DL (ref 70–110)
POCT GLUCOSE: 80 MG/DL (ref 70–110)
POCT GLUCOSE: 98 MG/DL (ref 70–110)

## 2022-06-03 PROCEDURE — 25000003 PHARM REV CODE 250

## 2022-06-03 PROCEDURE — 88307 PR  SURG PATH,LEVEL V: ICD-10-PCS | Mod: 26,,, | Performed by: PATHOLOGY

## 2022-06-03 PROCEDURE — 36004724 HC OB OR TIME LEV III - 1ST 15 MIN: Performed by: STUDENT IN AN ORGANIZED HEALTH CARE EDUCATION/TRAINING PROGRAM

## 2022-06-03 PROCEDURE — 25000003 PHARM REV CODE 250: Performed by: STUDENT IN AN ORGANIZED HEALTH CARE EDUCATION/TRAINING PROGRAM

## 2022-06-03 PROCEDURE — 59514 PR CESAREAN DELIVERY ONLY: ICD-10-PCS | Mod: 82,AT,, | Performed by: OBSTETRICS & GYNECOLOGY

## 2022-06-03 PROCEDURE — 59514 CESAREAN DELIVERY ONLY: CPT | Mod: QY,,, | Performed by: ANESTHESIOLOGY

## 2022-06-03 PROCEDURE — 63600175 PHARM REV CODE 636 W HCPCS: Performed by: STUDENT IN AN ORGANIZED HEALTH CARE EDUCATION/TRAINING PROGRAM

## 2022-06-03 PROCEDURE — 63600175 PHARM REV CODE 636 W HCPCS

## 2022-06-03 PROCEDURE — 62326 NJX INTERLAMINAR LMBR/SAC: CPT | Performed by: STUDENT IN AN ORGANIZED HEALTH CARE EDUCATION/TRAINING PROGRAM

## 2022-06-03 PROCEDURE — 59510 CESAREAN DELIVERY: CPT | Mod: AT,,, | Performed by: STUDENT IN AN ORGANIZED HEALTH CARE EDUCATION/TRAINING PROGRAM

## 2022-06-03 PROCEDURE — 27200710 HC EPIDURAL INFUSION PUMP SET: Performed by: ANESTHESIOLOGY

## 2022-06-03 PROCEDURE — 37000009 HC ANESTHESIA EA ADD 15 MINS: Performed by: STUDENT IN AN ORGANIZED HEALTH CARE EDUCATION/TRAINING PROGRAM

## 2022-06-03 PROCEDURE — 25000003 PHARM REV CODE 250: Performed by: OBSTETRICS & GYNECOLOGY

## 2022-06-03 PROCEDURE — 01968 ANES/ANALG CS DLVR NEURAXIAL: CPT | Mod: QY,,, | Performed by: ANESTHESIOLOGY

## 2022-06-03 PROCEDURE — 88307 TISSUE EXAM BY PATHOLOGIST: CPT | Mod: 26,,, | Performed by: PATHOLOGY

## 2022-06-03 PROCEDURE — 71000039 HC RECOVERY, EACH ADD'L HOUR: Performed by: STUDENT IN AN ORGANIZED HEALTH CARE EDUCATION/TRAINING PROGRAM

## 2022-06-03 PROCEDURE — C1751 CATH, INF, PER/CENT/MIDLINE: HCPCS | Performed by: ANESTHESIOLOGY

## 2022-06-03 PROCEDURE — 71000033 HC RECOVERY, INTIAL HOUR: Performed by: STUDENT IN AN ORGANIZED HEALTH CARE EDUCATION/TRAINING PROGRAM

## 2022-06-03 PROCEDURE — 59514 CESAREAN DELIVERY ONLY: CPT | Mod: 82,AT,, | Performed by: OBSTETRICS & GYNECOLOGY

## 2022-06-03 PROCEDURE — 72200006 HC VAGINAL DELIVERY LEVEL III

## 2022-06-03 PROCEDURE — 11000001 HC ACUTE MED/SURG PRIVATE ROOM

## 2022-06-03 PROCEDURE — 88307 TISSUE EXAM BY PATHOLOGIST: CPT | Performed by: PATHOLOGY

## 2022-06-03 PROCEDURE — 37000008 HC ANESTHESIA 1ST 15 MINUTES: Performed by: STUDENT IN AN ORGANIZED HEALTH CARE EDUCATION/TRAINING PROGRAM

## 2022-06-03 PROCEDURE — 36004725 HC OB OR TIME LEV III - EA ADD 15 MIN: Performed by: STUDENT IN AN ORGANIZED HEALTH CARE EDUCATION/TRAINING PROGRAM

## 2022-06-03 PROCEDURE — 59510 PR FULL ROUT OBSTE CARE,CESAREAN DELIV: ICD-10-PCS | Mod: AT,,, | Performed by: STUDENT IN AN ORGANIZED HEALTH CARE EDUCATION/TRAINING PROGRAM

## 2022-06-03 PROCEDURE — 01968 PR INSERT CATH,ART,PERCUT,SHORTTERM: ICD-10-PCS | Mod: QY,,, | Performed by: ANESTHESIOLOGY

## 2022-06-03 PROCEDURE — 59514 PRA REAN DELIVERY ONLY: ICD-10-PCS | Mod: QY,,, | Performed by: ANESTHESIOLOGY

## 2022-06-03 RX ORDER — LIDOCAINE HYDROCHLORIDE AND EPINEPHRINE 15; 5 MG/ML; UG/ML
INJECTION, SOLUTION EPIDURAL
Status: DISCONTINUED | OUTPATIENT
Start: 2022-06-03 | End: 2022-06-06

## 2022-06-03 RX ORDER — FENTANYL CITRATE 50 UG/ML
INJECTION, SOLUTION INTRAMUSCULAR; INTRAVENOUS
Status: DISCONTINUED | OUTPATIENT
Start: 2022-06-03 | End: 2022-06-06

## 2022-06-03 RX ORDER — LABETALOL HYDROCHLORIDE 5 MG/ML
20 INJECTION, SOLUTION INTRAVENOUS ONCE
Status: COMPLETED | OUTPATIENT
Start: 2022-06-03 | End: 2022-06-03

## 2022-06-03 RX ORDER — PROCHLORPERAZINE EDISYLATE 5 MG/ML
5 INJECTION INTRAMUSCULAR; INTRAVENOUS EVERY 6 HOURS PRN
Status: DISCONTINUED | OUTPATIENT
Start: 2022-06-03 | End: 2022-06-04

## 2022-06-03 RX ORDER — LABETALOL HYDROCHLORIDE 5 MG/ML
INJECTION, SOLUTION INTRAVENOUS
Status: COMPLETED
Start: 2022-06-03 | End: 2022-06-03

## 2022-06-03 RX ORDER — OXYCODONE HYDROCHLORIDE 5 MG/1
10 TABLET ORAL EVERY 4 HOURS PRN
Status: DISPENSED | OUTPATIENT
Start: 2022-06-03 | End: 2022-06-04

## 2022-06-03 RX ORDER — DIPHENHYDRAMINE HCL 25 MG
25 CAPSULE ORAL EVERY 4 HOURS PRN
Status: DISCONTINUED | OUTPATIENT
Start: 2022-06-03 | End: 2022-06-08 | Stop reason: HOSPADM

## 2022-06-03 RX ORDER — FENTANYL/BUPIVACAINE/NS/PF 2MCG/ML-.1
PLASTIC BAG, INJECTION (ML) INJECTION
Status: COMPLETED
Start: 2022-06-03 | End: 2022-06-03

## 2022-06-03 RX ORDER — ONDANSETRON HYDROCHLORIDE 2 MG/ML
INJECTION, SOLUTION INTRAMUSCULAR; INTRAVENOUS
Status: DISCONTINUED | OUTPATIENT
Start: 2022-06-03 | End: 2022-06-06

## 2022-06-03 RX ORDER — CARBOPROST TROMETHAMINE 250 UG/ML
250 INJECTION, SOLUTION INTRAMUSCULAR
Status: DISCONTINUED | OUTPATIENT
Start: 2022-06-03 | End: 2022-06-08 | Stop reason: HOSPADM

## 2022-06-03 RX ORDER — ACETAMINOPHEN 325 MG/1
650 TABLET ORAL EVERY 6 HOURS
Status: DISCONTINUED | OUTPATIENT
Start: 2022-06-04 | End: 2022-06-04

## 2022-06-03 RX ORDER — OXYTOCIN/RINGER'S LACTATE 30/500 ML
95 PLASTIC BAG, INJECTION (ML) INTRAVENOUS ONCE
Status: DISCONTINUED | OUTPATIENT
Start: 2022-06-03 | End: 2022-06-04

## 2022-06-03 RX ORDER — FENTANYL CITRATE 50 UG/ML
INJECTION, SOLUTION INTRAMUSCULAR; INTRAVENOUS
Status: COMPLETED
Start: 2022-06-03 | End: 2022-06-03

## 2022-06-03 RX ORDER — KETOROLAC TROMETHAMINE 30 MG/ML
30 INJECTION, SOLUTION INTRAMUSCULAR; INTRAVENOUS EVERY 6 HOURS
Status: DISCONTINUED | OUTPATIENT
Start: 2022-06-03 | End: 2022-06-04

## 2022-06-03 RX ORDER — OXYTOCIN 10 [USP'U]/ML
INJECTION, SOLUTION INTRAMUSCULAR; INTRAVENOUS
Status: DISCONTINUED | OUTPATIENT
Start: 2022-06-03 | End: 2022-06-06

## 2022-06-03 RX ORDER — NALBUPHINE HYDROCHLORIDE 10 MG/ML
2.5 INJECTION, SOLUTION INTRAMUSCULAR; INTRAVENOUS; SUBCUTANEOUS ONCE AS NEEDED
Status: DISCONTINUED | OUTPATIENT
Start: 2022-06-03 | End: 2022-06-08 | Stop reason: HOSPADM

## 2022-06-03 RX ORDER — ACETAMINOPHEN 10 MG/ML
INJECTION, SOLUTION INTRAVENOUS
Status: DISCONTINUED | OUTPATIENT
Start: 2022-06-03 | End: 2022-06-06

## 2022-06-03 RX ORDER — MISOPROSTOL 200 UG/1
800 TABLET ORAL
Status: DISCONTINUED | OUTPATIENT
Start: 2022-06-03 | End: 2022-06-08 | Stop reason: HOSPADM

## 2022-06-03 RX ORDER — TRANEXAMIC ACID 100 MG/ML
INJECTION, SOLUTION INTRAVENOUS
Status: DISCONTINUED | OUTPATIENT
Start: 2022-06-03 | End: 2022-06-06

## 2022-06-03 RX ORDER — DEXAMETHASONE SODIUM PHOSPHATE 4 MG/ML
INJECTION, SOLUTION INTRA-ARTICULAR; INTRALESIONAL; INTRAMUSCULAR; INTRAVENOUS; SOFT TISSUE
Status: DISCONTINUED | OUTPATIENT
Start: 2022-06-03 | End: 2022-06-06

## 2022-06-03 RX ORDER — MISOPROSTOL 200 UG/1
TABLET ORAL
Status: COMPLETED
Start: 2022-06-03 | End: 2022-06-03

## 2022-06-03 RX ORDER — PROMETHAZINE HYDROCHLORIDE 25 MG/ML
INJECTION, SOLUTION INTRAMUSCULAR; INTRAVENOUS
Status: DISCONTINUED | OUTPATIENT
Start: 2022-06-03 | End: 2022-06-06

## 2022-06-03 RX ORDER — LIDOCAINE HCL/EPINEPHRINE/PF 2%-1:200K
VIAL (ML) INJECTION
Status: DISCONTINUED | OUTPATIENT
Start: 2022-06-03 | End: 2022-06-06

## 2022-06-03 RX ORDER — DIPHENOXYLATE HYDROCHLORIDE AND ATROPINE SULFATE 2.5; .025 MG/1; MG/1
1 TABLET ORAL 4 TIMES DAILY PRN
Status: DISCONTINUED | OUTPATIENT
Start: 2022-06-03 | End: 2022-06-04

## 2022-06-03 RX ORDER — BUPIVACAINE HYDROCHLORIDE 2.5 MG/ML
INJECTION, SOLUTION EPIDURAL; INFILTRATION; INTRACAUDAL
Status: DISPENSED
Start: 2022-06-03 | End: 2022-06-04

## 2022-06-03 RX ORDER — METHYLERGONOVINE MALEATE 0.2 MG/ML
200 INJECTION INTRAVENOUS
Status: DISCONTINUED | OUTPATIENT
Start: 2022-06-03 | End: 2022-06-08 | Stop reason: HOSPADM

## 2022-06-03 RX ORDER — CARBOPROST TROMETHAMINE 250 UG/ML
INJECTION, SOLUTION INTRAMUSCULAR
Status: DISCONTINUED | OUTPATIENT
Start: 2022-06-03 | End: 2022-06-06

## 2022-06-03 RX ORDER — ONDANSETRON 2 MG/ML
4 INJECTION INTRAMUSCULAR; INTRAVENOUS EVERY 6 HOURS PRN
Status: DISCONTINUED | OUTPATIENT
Start: 2022-06-03 | End: 2022-06-04

## 2022-06-03 RX ORDER — MORPHINE SULFATE 0.5 MG/ML
INJECTION, SOLUTION EPIDURAL; INTRATHECAL; INTRAVENOUS
Status: DISCONTINUED | OUTPATIENT
Start: 2022-06-03 | End: 2022-06-06

## 2022-06-03 RX ORDER — DIPHENOXYLATE HYDROCHLORIDE AND ATROPINE SULFATE 2.5; .025 MG/1; MG/1
TABLET ORAL
Status: DISCONTINUED | OUTPATIENT
Start: 2022-06-03 | End: 2022-06-03 | Stop reason: HOSPADM

## 2022-06-03 RX ORDER — LABETALOL HYDROCHLORIDE 5 MG/ML
40 INJECTION, SOLUTION INTRAVENOUS ONCE
Status: COMPLETED | OUTPATIENT
Start: 2022-06-03 | End: 2022-06-03

## 2022-06-03 RX ORDER — OXYCODONE HYDROCHLORIDE 5 MG/1
5 TABLET ORAL EVERY 4 HOURS PRN
Status: ACTIVE | OUTPATIENT
Start: 2022-06-03 | End: 2022-06-04

## 2022-06-03 RX ORDER — BISACODYL 10 MG
10 SUPPOSITORY, RECTAL RECTAL ONCE AS NEEDED
Status: DISCONTINUED | OUTPATIENT
Start: 2022-06-03 | End: 2022-06-08 | Stop reason: HOSPADM

## 2022-06-03 RX ORDER — LABETALOL HYDROCHLORIDE 5 MG/ML
INJECTION, SOLUTION INTRAVENOUS
Status: DISPENSED
Start: 2022-06-03 | End: 2022-06-04

## 2022-06-03 RX ORDER — FAMOTIDINE 10 MG/ML
20 INJECTION INTRAVENOUS ONCE
Status: COMPLETED | OUTPATIENT
Start: 2022-06-03 | End: 2022-06-03

## 2022-06-03 RX ORDER — FENTANYL/BUPIVACAINE/NS/PF 2MCG/ML-.1
PLASTIC BAG, INJECTION (ML) INJECTION CONTINUOUS
Status: DISCONTINUED | OUTPATIENT
Start: 2022-06-03 | End: 2022-06-04

## 2022-06-03 RX ORDER — CEFAZOLIN SODIUM 1 G/3ML
INJECTION, POWDER, FOR SOLUTION INTRAMUSCULAR; INTRAVENOUS
Status: DISCONTINUED | OUTPATIENT
Start: 2022-06-03 | End: 2022-06-06

## 2022-06-03 RX ORDER — SODIUM CITRATE AND CITRIC ACID MONOHYDRATE 334; 500 MG/5ML; MG/5ML
30 SOLUTION ORAL ONCE
Status: COMPLETED | OUTPATIENT
Start: 2022-06-03 | End: 2022-06-03

## 2022-06-03 RX ORDER — TRANEXAMIC ACID 100 MG/ML
INJECTION, SOLUTION INTRAVENOUS
Status: COMPLETED
Start: 2022-06-03 | End: 2022-06-03

## 2022-06-03 RX ORDER — FENTANYL/BUPIVACAINE/NS/PF 2MCG/ML-.1
PLASTIC BAG, INJECTION (ML) INJECTION CONTINUOUS PRN
Status: DISCONTINUED | OUTPATIENT
Start: 2022-06-03 | End: 2022-06-06

## 2022-06-03 RX ORDER — BUPIVACAINE HYDROCHLORIDE 2.5 MG/ML
INJECTION, SOLUTION EPIDURAL; INFILTRATION; INTRACAUDAL
Status: DISPENSED
Start: 2022-06-03 | End: 2022-06-03

## 2022-06-03 RX ORDER — LABETALOL HYDROCHLORIDE 5 MG/ML
80 INJECTION, SOLUTION INTRAVENOUS ONCE
Status: COMPLETED | OUTPATIENT
Start: 2022-06-03 | End: 2022-06-03

## 2022-06-03 RX ADMIN — FAMOTIDINE 20 MG: 10 INJECTION INTRAVENOUS at 06:06

## 2022-06-03 RX ADMIN — LABETALOL HYDROCHLORIDE 200 MG: 200 TABLET, FILM COATED ORAL at 02:06

## 2022-06-03 RX ADMIN — SODIUM CHLORIDE, SODIUM LACTATE, POTASSIUM CHLORIDE, AND CALCIUM CHLORIDE 1000 ML: .6; .31; .03; .02 INJECTION, SOLUTION INTRAVENOUS at 01:06

## 2022-06-03 RX ADMIN — FENTANYL CITRATE 100 MCG: 50 INJECTION, SOLUTION INTRAMUSCULAR; INTRAVENOUS at 07:06

## 2022-06-03 RX ADMIN — CARBOPROST TROMETHAMINE 250 MCG: 250 INJECTION, SOLUTION INTRAMUSCULAR at 08:06

## 2022-06-03 RX ADMIN — OXYTOCIN 8 UNITS: 10 INJECTION, SOLUTION INTRAMUSCULAR; INTRAVENOUS at 08:06

## 2022-06-03 RX ADMIN — LIDOCAINE HYDROCHLORIDE,EPINEPHRINE BITARTRATE 5 MG: 20; .005 INJECTION, SOLUTION EPIDURAL; INFILTRATION; INTRACAUDAL; PERINEURAL at 08:06

## 2022-06-03 RX ADMIN — TRANEXAMIC ACID 1000 MG: 100 INJECTION, SOLUTION INTRAVENOUS at 08:06

## 2022-06-03 RX ADMIN — ACETAMINOPHEN 1000 MG: 10 INJECTION INTRAVENOUS at 08:06

## 2022-06-03 RX ADMIN — AZITHROMYCIN MONOHYDRATE 500 MG: 500 INJECTION, POWDER, LYOPHILIZED, FOR SOLUTION INTRAVENOUS at 06:06

## 2022-06-03 RX ADMIN — MORPHINE SULFATE 1.5 MG: 0.5 INJECTION, SOLUTION EPIDURAL; INTRATHECAL; INTRAVENOUS at 08:06

## 2022-06-03 RX ADMIN — LABETALOL HYDROCHLORIDE 20 MG: 5 INJECTION INTRAVENOUS at 01:06

## 2022-06-03 RX ADMIN — KETOROLAC TROMETHAMINE 30 MG: 30 INJECTION, SOLUTION INTRAMUSCULAR at 09:06

## 2022-06-03 RX ADMIN — BUPIVACAINE HYDROCHLORIDE 10 ML: 2.5 INJECTION, SOLUTION EPIDURAL; INFILTRATION; INTRACAUDAL; PERINEURAL at 03:06

## 2022-06-03 RX ADMIN — SODIUM CHLORIDE, SODIUM LACTATE, POTASSIUM CHLORIDE, AND CALCIUM CHLORIDE 1000 ML: .6; .31; .03; .02 INJECTION, SOLUTION INTRAVENOUS at 10:06

## 2022-06-03 RX ADMIN — SODIUM CITRATE AND CITRIC ACID MONOHYDRATE 30 ML: 500; 334 SOLUTION ORAL at 06:06

## 2022-06-03 RX ADMIN — DEXAMETHASONE SODIUM PHOSPHATE 4 MG: 4 INJECTION INTRA-ARTICULAR; INTRALESIONAL; INTRAMUSCULAR; INTRAVENOUS; SOFT TISSUE at 08:06

## 2022-06-03 RX ADMIN — SODIUM CHLORIDE, SODIUM LACTATE, POTASSIUM CHLORIDE, AND CALCIUM CHLORIDE 1000 ML: .6; .31; .03; .02 INJECTION, SOLUTION INTRAVENOUS at 07:06

## 2022-06-03 RX ADMIN — Medication 95 MILLI-UNITS/MIN: at 09:06

## 2022-06-03 RX ADMIN — ONDANSETRON 4 MG: 2 INJECTION INTRAMUSCULAR; INTRAVENOUS at 08:06

## 2022-06-03 RX ADMIN — CEFAZOLIN 2 G: 330 INJECTION, POWDER, FOR SOLUTION INTRAMUSCULAR; INTRAVENOUS at 08:06

## 2022-06-03 RX ADMIN — PROMETHAZINE HYDROCHLORIDE 6.25 MG: 25 INJECTION INTRAMUSCULAR; INTRAVENOUS at 08:06

## 2022-06-03 RX ADMIN — ESCITALOPRAM OXALATE 20 MG: 10 TABLET ORAL at 01:06

## 2022-06-03 RX ADMIN — LIDOCAINE HYDROCHLORIDE,EPINEPHRINE BITARTRATE 5 MG: 20; .005 INJECTION, SOLUTION EPIDURAL; INFILTRATION; INTRACAUDAL; PERINEURAL at 07:06

## 2022-06-03 RX ADMIN — LABETALOL HYDROCHLORIDE 80 MG: 5 INJECTION INTRAVENOUS at 02:06

## 2022-06-03 RX ADMIN — LABETALOL HYDROCHLORIDE 200 MG: 200 TABLET, FILM COATED ORAL at 08:06

## 2022-06-03 RX ADMIN — LIDOCAINE HYDROCHLORIDE,EPINEPHRINE BITARTRATE 3 ML: 15; .005 INJECTION, SOLUTION EPIDURAL; INFILTRATION; INTRACAUDAL; PERINEURAL at 07:06

## 2022-06-03 RX ADMIN — BUPIVACAINE HYDROCHLORIDE 10 ML: 2.5 INJECTION, SOLUTION EPIDURAL; INFILTRATION; INTRACAUDAL; PERINEURAL at 07:06

## 2022-06-03 RX ADMIN — LABETALOL HYDROCHLORIDE 40 MG: 5 INJECTION INTRAVENOUS at 01:06

## 2022-06-03 RX ADMIN — Medication 4 MILLI-UNITS/MIN: at 12:06

## 2022-06-03 RX ADMIN — Medication 10 ML/HR: at 07:06

## 2022-06-03 RX ADMIN — LABETALOL HYDROCHLORIDE 20 MG: 5 INJECTION, SOLUTION INTRAVENOUS at 01:06

## 2022-06-03 NOTE — CARE UPDATE
Called to beside due to prolonged decel after epidural placement.  BP initially in 160s systolic, dropped to 99/51 after epidural.  FHTs in 60s, improved to 90s with repositioning and meds per anesthesia.  Cvx unchanged.  FHTs improved to 130s after 8-9 minutes.  Pitocin off.  /66.  FHTs now Cat 1.  Will continue to monitor closely.  Restart pitocin in 30 minutes, will also plan AROM at that time, if fetal status continues to stay reassuring.  Discussed situation with patient, all questions answered.

## 2022-06-03 NOTE — PROGRESS NOTES
"LABOR NOTE    S:  Complaints: No.  Epidural working:  not applicable      O: /84   Pulse 81   Temp 97.8 °F (36.6 °C) (Oral)   Resp 20   Ht 5' 2" (1.575 m)   Wt 85.7 kg (189 lb)   LMP 09/15/2021   SpO2 99%   Breastfeeding No   BMI 34.57 kg/m²     FHT: baseline 130, moderate BTBV, + accels, no decels Cat 1 (reassuring)  CTX: q 1-5 minutes  SVE: 4/60/-3, motley balloon out    Timeline  1530 cytotec #1  1730: ft/50/-3, motley balloon placement  1930: cytotec #2  2130: motley balloon out, 4/60/-3, plan to start pitocin      PLAN:  Continue Close Maternal/Fetal Monitoring  Pitocin Augmentation per protocol  Recheck 2-4 hours or PRN    Brittny Morton MD  PGY-1 OB/GYN      "

## 2022-06-03 NOTE — PROGRESS NOTES
OB Progress Note    MD at bedside for repeat cervical exam.  SVE remains unchanged at 4 cm.  EFM remains category 2.  Reviewed with patient and partner.  Recommend proceeding with 1LTCS at this time secondary to non reassuring fetal heart tones.  R/b/a reviewed and all questions answered.  Patient and partner in agreement with plan and desires to proceed.  Nursing and anesthesia aware.

## 2022-06-03 NOTE — PROGRESS NOTES
06/02/22 1904   Vital Signs   BP (!) 169/103   MAP (mmHg) 128     MD notified of repeat BP after 20mg Labetalol given. Orders received for an additional 40mg of Labetalol.  Repeat BP after second dose 134/88. Will continue to monitor.

## 2022-06-03 NOTE — PROGRESS NOTES
"LABOR NOTE    Patient doing well, comfortable with epidural.      O: BP (!) 137/95   Pulse 91   Temp 97.8 °F (36.6 °C) (Oral)   Resp 18   Ht 5' 2" (1.575 m)   Wt 85.7 kg (189 lb)   LMP 09/15/2021   SpO2 95%   Breastfeeding No   BMI 34.57 kg/m²     FHT: Cat 1  CTX: irregular pattern, Pit currently at 4  SVE: /-2, AROM clear    Timeline  1530 cytotec #1  1730: /-3, motley balloon placement  1930: cytotec #2  2130: motley balloon out, /-3, plan to start pitocin  0330: /-3, too high to AROM  0700: /-2  1000: /-2, AROM clear    A/P: 33yo  at 35w1d for IOL due to cHTN with JEREL with SF  - Continue Pitocin as per protocol.  - Fetal status reassuring.  - Continue magnesium for seizure ppx.  - Plan recheck in 4 hours or prn.     Saloni Voss MD      "

## 2022-06-03 NOTE — PROGRESS NOTES
Labor Progress Note        Subjective:      Patient currently doing well without complaints.     Objective:      Temp:  [98.2 °F (36.8 °C)-98.3 °F (36.8 °C)] 98.2 °F (36.8 °C)  Pulse:  [] 85  Resp:  [18] 18  SpO2:  [94 %-100 %] 100 %  BP: (135-175)/() 165/93  Body mass index is 34.57 kg/m².     General: no acute distress  Electronic Fetal Monitoring:  FHT: 130 bpm, moderate variability, accelerations present, decelerations absent   Category: 1                 TOCO: Contractions: regular, every 7 minutes   Cervix:cl/lg/high, BOWI and motley placed at 0520     Assessment:     1. IUP at  here for induction of labor for CHTN with superimposed pre-E with SF being BPs  2.   Group & Rh   Date Value Ref Range Status   2022 O POS  Final     3. Category 1 FHT     Plan:     1. Continue active management of labor  2. Patient requiring 20/20/40mg IV labetalol for severe range blood pressures.  Will continue to monitor closely.  3. Reassuring FHT

## 2022-06-03 NOTE — PROGRESS NOTES
"LABOR NOTE    Patient doing well, comfortable with epidural.      O: BP (!) 162/99   Pulse 78   Temp 97.8 °F (36.6 °C) (Oral)   Resp 18   Ht 5' 2" (1.575 m)   Wt 85.7 kg (189 lb)   LMP 09/15/2021   SpO2 100%   Breastfeeding No   BMI 34.57 kg/m²     FHT: Cat 1  CTX: irregular pattern, Pit currently at 16  SVE: /-2, IUPC placed.    Timeline  0 cytotec #1  1730: /-3, motley balloon placement  1930: cytotec #2  2130: motley balloon out, 3, plan to start pitocin  0330: -3, too high to AROM  0700: 2  1000: /-2, AROM clear  1400: /2, Pit at 16    A/P: 31yo  at 35w1d for IOL due to cHTN with JEREL with SF  - Persistent severe range BP - IV Labetalol 20/40/80 given, will also restart PO labetalol (held due to low BP this morning). Repeat BP pending.   - Continue Pitocin as per protocol.  Discussed with nurse - will increase by 4 milliunits every 30 min, as long as FHTs reassuring and not tachysystole as per protocol.  IUPC placed.   - Fetal status reassuring.  - Continue magnesium for seizure ppx.  - Plan recheck in 4 hours or prn.     Saloni Voss MD      "

## 2022-06-03 NOTE — PROGRESS NOTES
"LABOR NOTE    Patient doing well, not feeling ctx yet, no epidural yet.       O: /63   Pulse 83   Temp 97.8 °F (36.6 °C) (Oral)   Resp 18   Ht 5' 2" (1.575 m)   Wt 85.7 kg (189 lb)   LMP 09/15/2021   SpO2 99%   Breastfeeding No   BMI 34.57 kg/m²     FHT: Currently Cat 1, decelerations now resolved  CTX: irregular pattern, Pit currently at 16  SVE: /2    Timeline  1530 cytotec #1  1730: 50/-3, motley balloon placement  1930: cytotec #2  2130: motley balloon out, 3, plan to start pitocin  0330: /3, too high to AROM  0700: /2    A/P: 31yo  at 35w1d for IOL due to cHTN with JEREL with SF  - Continue Pitocin as per protocol.  - Offer AROM, however pt desires epidural first.  - Fetal status reassuring.  - Continue magnesium for seizure ppx.  - Will notify anesthesia to place epidural and plan AROM after pt comfortable.     Saloni Voss MD        "

## 2022-06-03 NOTE — PROGRESS NOTES
06/03/22 0830   [facility] Note - Strip   Strip Reviewed by [place] Nurse? Yes   [facility] Note - Communication   [place] Nurse Communicated with Bedside Nurse Regarding: Fetal Status   [facility] Note - Notification   Nurse Notified? Yes   Name of Nurse Astrid   Doctor Notified? No   RN at bedside

## 2022-06-03 NOTE — PROGRESS NOTES
"LABOR NOTE    S:  Complaints: No.  Epidural working:  not applicable      O: BP (!) 156/89 (BP Location: Left arm, Patient Position: Sitting)   Pulse 87   Temp 97.7 °F (36.5 °C) (Oral)   Resp 18   Ht 5' 2" (1.575 m)   Wt 85.7 kg (189 lb)   LMP 09/15/2021   SpO2 96%   Breastfeeding No   BMI 34.57 kg/m²     FHT: baseline 125, moderate BTBV, - accels, no decels Cat 1 (reassuring)  CTX: q 2-5 minutes  SVE: 4/60/-3, too high to rupture    Timeline  1530 cytotec #1  1730: ft/50/-3, motley balloon placement  1930: cytotec #2  2130: motley balloon out, 4/60/-3, plan to start pitocin  0330: 4/60/-3, too high to AROM    PLAN:  Continue Close Maternal/Fetal Monitoring  Pitocin Augmentation per protocol  Recheck 2-4 hours or PRN  Plan for AROM at next check    Brittny Morton MD  PGY-1 OB/GYN      "

## 2022-06-03 NOTE — ANESTHESIA PROCEDURE NOTES
Epidural    Patient location during procedure: OB   Reason for block: primary anesthetic   Reason for block: labor analgesia requested by patient and obstetrician  Diagnosis: IUP   Start time: 6/3/2022 7:42 AM  Timeout: 6/3/2022 7:42 AM  End time: 6/3/2022 7:52 AM  Surgery related to: Vaginal Delivery    Staffing  Performing Provider: Travis Hopson MD  Authorizing Provider: Merary Simons MD        Preanesthetic Checklist  Completed: patient identified, IV checked, site marked, risks and benefits discussed, surgical consent, monitors and equipment checked, pre-op evaluation, timeout performed, anesthesia consent given, hand hygiene performed and patient being monitored  Preparation  Patient position: sitting  Prep: ChloraPrep  Patient monitoring: Pulse Ox  Reason for block: primary anesthetic   Epidural  Skin Anesthetic: lidocaine 1%  Skin Wheal: 3 mL  Administration type: continuous  Approach: midline  Interspace: L3-4    Injection technique: AARON air  Needle and Epidural Catheter  Needle type: Tuohy   Needle gauge: 17  Needle length: 3.5 inches  Needle insertion depth: 5 cm  Catheter type: [employer]  Catheter size: 19 G  Catheter at skin depth: 9 cm  Insertion Attempts: 1  Test dose: 3 mL of lidocaine 1.5% with Epi 1-to-200,000  Additional Documentation: incremental injection, negative aspiration for heme and CSF, no paresthesia on injection, no signs/symptoms of IV or SA injection, no significant pain on injection and no significant complaints from patient  Needle localization: anatomical landmarks  Medications:  Volume per aspiration: 5 mL  Time between aspirations: 5 minutes   Assessment  Ease of block: easy  Patient's tolerance of the procedure: comfortable throughout block and no complaints No inadvertent dural puncture with Tuohy.  Dural puncture performed with spinal needle.

## 2022-06-03 NOTE — CARE UPDATE
MD to bedside for repetitive late decels  Patient repositioned and pitocin paused. SVE repeated and remains unchanged. 4/60/-3, fetal head too high for AROM at this time.     Tracing is now reassuring with no decels & accels present. Will restart pitocin & continue to monitor.     Lalita Madera M.D.  OB/GYN PGY-3

## 2022-06-03 NOTE — CARE UPDATE
Discussed with patient - FHTs now with subtle late decelerations and adequate MVUs with Pit at 16.  Overall fetal status is reassuring at the moment, but in light of adequate contractions and no further cervical change, now with Cat 2 FHTs, would recommend proceeding with primary c/s.  All questions answered.  Will recheck cervix at 4pm and if still 4cm will proceed with c/s.  If fetal status worsens before then, will proceed sooner.  Again all questions answered, patient in agreement with plan.

## 2022-06-04 LAB
BASOPHILS # BLD AUTO: 0.03 K/UL (ref 0–0.2)
BASOPHILS NFR BLD: 0.2 % (ref 0–1.9)
DIFFERENTIAL METHOD: ABNORMAL
EOSINOPHIL # BLD AUTO: 0 K/UL (ref 0–0.5)
EOSINOPHIL NFR BLD: 0.1 % (ref 0–8)
ERYTHROCYTE [DISTWIDTH] IN BLOOD BY AUTOMATED COUNT: 12.6 % (ref 11.5–14.5)
HCT VFR BLD AUTO: 28.5 % (ref 37–48.5)
HGB BLD-MCNC: 9.7 G/DL (ref 12–16)
IMM GRANULOCYTES # BLD AUTO: 0.08 K/UL (ref 0–0.04)
IMM GRANULOCYTES NFR BLD AUTO: 0.7 % (ref 0–0.5)
LYMPHOCYTES # BLD AUTO: 1.3 K/UL (ref 1–4.8)
LYMPHOCYTES NFR BLD: 10.3 % (ref 18–48)
MCH RBC QN AUTO: 32.1 PG (ref 27–31)
MCHC RBC AUTO-ENTMCNC: 34 G/DL (ref 32–36)
MCV RBC AUTO: 94 FL (ref 82–98)
MONOCYTES # BLD AUTO: 0.4 K/UL (ref 0.3–1)
MONOCYTES NFR BLD: 3.1 % (ref 4–15)
NEUTROPHILS # BLD AUTO: 10.5 K/UL (ref 1.8–7.7)
NEUTROPHILS NFR BLD: 85.6 % (ref 38–73)
NRBC BLD-RTO: 0 /100 WBC
PLATELET # BLD AUTO: 188 K/UL (ref 150–450)
PMV BLD AUTO: 10.9 FL (ref 9.2–12.9)
POCT GLUCOSE: 90 MG/DL (ref 70–110)
RBC # BLD AUTO: 3.02 M/UL (ref 4–5.4)
WBC # BLD AUTO: 12.27 K/UL (ref 3.9–12.7)

## 2022-06-04 PROCEDURE — 63600175 PHARM REV CODE 636 W HCPCS: Performed by: STUDENT IN AN ORGANIZED HEALTH CARE EDUCATION/TRAINING PROGRAM

## 2022-06-04 PROCEDURE — 25000003 PHARM REV CODE 250: Performed by: STUDENT IN AN ORGANIZED HEALTH CARE EDUCATION/TRAINING PROGRAM

## 2022-06-04 PROCEDURE — 85025 COMPLETE CBC W/AUTO DIFF WBC: CPT

## 2022-06-04 PROCEDURE — 63600175 PHARM REV CODE 636 W HCPCS

## 2022-06-04 PROCEDURE — 25000003 PHARM REV CODE 250

## 2022-06-04 PROCEDURE — 11000001 HC ACUTE MED/SURG PRIVATE ROOM

## 2022-06-04 PROCEDURE — 36415 COLL VENOUS BLD VENIPUNCTURE: CPT

## 2022-06-04 RX ORDER — PROCHLORPERAZINE EDISYLATE 5 MG/ML
5 INJECTION INTRAMUSCULAR; INTRAVENOUS EVERY 6 HOURS PRN
Status: DISCONTINUED | OUTPATIENT
Start: 2022-06-04 | End: 2022-06-08 | Stop reason: HOSPADM

## 2022-06-04 RX ORDER — PRENATAL WITH FERROUS FUM AND FOLIC ACID 3080; 920; 120; 400; 22; 1.84; 3; 20; 10; 1; 12; 200; 27; 25; 2 [IU]/1; [IU]/1; MG/1; [IU]/1; MG/1; MG/1; MG/1; MG/1; MG/1; MG/1; UG/1; MG/1; MG/1; MG/1; MG/1
1 TABLET ORAL DAILY
Status: DISCONTINUED | OUTPATIENT
Start: 2022-06-04 | End: 2022-06-08 | Stop reason: HOSPADM

## 2022-06-04 RX ORDER — SODIUM CHLORIDE 0.9 % (FLUSH) 0.9 %
10 SYRINGE (ML) INJECTION
Status: DISCONTINUED | OUTPATIENT
Start: 2022-06-04 | End: 2022-06-08 | Stop reason: HOSPADM

## 2022-06-04 RX ORDER — OXYCODONE AND ACETAMINOPHEN 10; 325 MG/1; MG/1
1 TABLET ORAL EVERY 4 HOURS PRN
Status: DISCONTINUED | OUTPATIENT
Start: 2022-06-04 | End: 2022-06-04

## 2022-06-04 RX ORDER — OXYCODONE AND ACETAMINOPHEN 5; 325 MG/1; MG/1
1 TABLET ORAL EVERY 4 HOURS PRN
Status: DISCONTINUED | OUTPATIENT
Start: 2022-06-05 | End: 2022-06-05

## 2022-06-04 RX ORDER — DIPHENOXYLATE HYDROCHLORIDE AND ATROPINE SULFATE 2.5; .025 MG/1; MG/1
1 TABLET ORAL 4 TIMES DAILY PRN
Status: DISCONTINUED | OUTPATIENT
Start: 2022-06-04 | End: 2022-06-08 | Stop reason: HOSPADM

## 2022-06-04 RX ORDER — DOCUSATE SODIUM 100 MG/1
200 CAPSULE, LIQUID FILLED ORAL 2 TIMES DAILY
Status: DISCONTINUED | OUTPATIENT
Start: 2022-06-04 | End: 2022-06-08 | Stop reason: HOSPADM

## 2022-06-04 RX ORDER — ADHESIVE BANDAGE
30 BANDAGE TOPICAL 2 TIMES DAILY PRN
Status: DISCONTINUED | OUTPATIENT
Start: 2022-06-04 | End: 2022-06-08 | Stop reason: HOSPADM

## 2022-06-04 RX ORDER — AMOXICILLIN 250 MG
1 CAPSULE ORAL NIGHTLY PRN
Status: DISCONTINUED | OUTPATIENT
Start: 2022-06-04 | End: 2022-06-08 | Stop reason: HOSPADM

## 2022-06-04 RX ORDER — SIMETHICONE 80 MG
1 TABLET,CHEWABLE ORAL EVERY 6 HOURS PRN
Status: DISCONTINUED | OUTPATIENT
Start: 2022-06-04 | End: 2022-06-08 | Stop reason: HOSPADM

## 2022-06-04 RX ORDER — OXYCODONE AND ACETAMINOPHEN 10; 325 MG/1; MG/1
1 TABLET ORAL EVERY 4 HOURS PRN
Status: DISCONTINUED | OUTPATIENT
Start: 2022-06-05 | End: 2022-06-05

## 2022-06-04 RX ORDER — ACETAMINOPHEN 325 MG/1
650 TABLET ORAL EVERY 6 HOURS
Status: DISCONTINUED | OUTPATIENT
Start: 2022-06-04 | End: 2022-06-08 | Stop reason: HOSPADM

## 2022-06-04 RX ORDER — ONDANSETRON 8 MG/1
8 TABLET, ORALLY DISINTEGRATING ORAL EVERY 8 HOURS PRN
Status: DISCONTINUED | OUTPATIENT
Start: 2022-06-04 | End: 2022-06-08 | Stop reason: HOSPADM

## 2022-06-04 RX ORDER — KETOROLAC TROMETHAMINE 30 MG/ML
30 INJECTION, SOLUTION INTRAMUSCULAR; INTRAVENOUS EVERY 6 HOURS
Status: COMPLETED | OUTPATIENT
Start: 2022-06-04 | End: 2022-06-04

## 2022-06-04 RX ORDER — OXYCODONE AND ACETAMINOPHEN 5; 325 MG/1; MG/1
1 TABLET ORAL EVERY 4 HOURS PRN
Status: DISCONTINUED | OUTPATIENT
Start: 2022-06-04 | End: 2022-06-04

## 2022-06-04 RX ORDER — KETOROLAC TROMETHAMINE 30 MG/ML
30 INJECTION, SOLUTION INTRAMUSCULAR; INTRAVENOUS EVERY 6 HOURS
Status: DISCONTINUED | OUTPATIENT
Start: 2022-06-04 | End: 2022-06-04

## 2022-06-04 RX ORDER — IBUPROFEN 400 MG/1
800 TABLET ORAL EVERY 8 HOURS
Status: DISCONTINUED | OUTPATIENT
Start: 2022-06-05 | End: 2022-06-04

## 2022-06-04 RX ORDER — IBUPROFEN 400 MG/1
800 TABLET ORAL EVERY 8 HOURS
Status: DISCONTINUED | OUTPATIENT
Start: 2022-06-05 | End: 2022-06-08 | Stop reason: HOSPADM

## 2022-06-04 RX ADMIN — OXYCODONE HYDROCHLORIDE 10 MG: 5 TABLET ORAL at 05:06

## 2022-06-04 RX ADMIN — KETOROLAC TROMETHAMINE 30 MG: 30 INJECTION, SOLUTION INTRAMUSCULAR at 11:06

## 2022-06-04 RX ADMIN — ESCITALOPRAM OXALATE 20 MG: 10 TABLET ORAL at 08:06

## 2022-06-04 RX ADMIN — PRENATAL VIT W/ FE FUMARATE-FA TAB 27-0.8 MG 1 TABLET: 27-0.8 TAB at 08:06

## 2022-06-04 RX ADMIN — MAGNESIUM SULFATE IN WATER 2 G/HR: 40 INJECTION, SOLUTION INTRAVENOUS at 08:06

## 2022-06-04 RX ADMIN — KETOROLAC TROMETHAMINE 30 MG: 30 INJECTION, SOLUTION INTRAMUSCULAR at 02:06

## 2022-06-04 RX ADMIN — DOCUSATE SODIUM 200 MG: 100 CAPSULE, LIQUID FILLED ORAL at 08:06

## 2022-06-04 RX ADMIN — SODIUM CHLORIDE, SODIUM LACTATE, POTASSIUM CHLORIDE, AND CALCIUM CHLORIDE 1000 ML: .6; .31; .03; .02 INJECTION, SOLUTION INTRAVENOUS at 01:06

## 2022-06-04 RX ADMIN — NIFEDIPINE 90 MG: 30 TABLET, FILM COATED, EXTENDED RELEASE ORAL at 08:06

## 2022-06-04 RX ADMIN — Medication 650 MG: at 02:06

## 2022-06-04 RX ADMIN — Medication 650 MG: at 06:06

## 2022-06-04 RX ADMIN — LABETALOL HYDROCHLORIDE 200 MG: 200 TABLET, FILM COATED ORAL at 08:06

## 2022-06-04 RX ADMIN — DOCUSATE SODIUM 200 MG: 100 CAPSULE, LIQUID FILLED ORAL at 09:06

## 2022-06-04 RX ADMIN — Medication 650 MG: at 11:06

## 2022-06-04 RX ADMIN — LABETALOL HYDROCHLORIDE 200 MG: 200 TABLET, FILM COATED ORAL at 09:06

## 2022-06-04 RX ADMIN — KETOROLAC TROMETHAMINE 30 MG: 30 INJECTION, SOLUTION INTRAMUSCULAR at 06:06

## 2022-06-04 NOTE — LACTATION NOTE
Lactation visited pt. Helped pt hand express (obtained 0.1ml colostrum) and use the pump (obtained gtts) while the baby was treated for a low blood sugar. Discussed with pt breastfeeding behaviors of a late  baby. Pt encourage to keep the baby STS as often as she can today and tonight. Feed the baby donor milk at least 8 or more times in 24hrs on cue until content, no longer than 3 hrs between the feeding. Pt to hand express and use the Symphony breast pump at each of the baby's feedings. Pt verbalized understanding of the breastfeeding plan.     Mother was taught hand expression of breastmilk/colostrum. She was instructed to:  Sit upright and lean forward, if possible.  When feasible, apply warm, wet compress over breasts for a few minutes.   Perform gentle breast massage.  Form a C with her hand and place it about 1 inch back from the areola with the nipple centered between her index finger and her thumb.  Press, compress, relax:  Using her finger and thumb, apply pressure in an inward direction toward the breast without stretching the tissue, compress the breast tissue between her finger and thumb, then relax her finger and thumb. Repeat process for a few minutes.  Rotate placement of finger and thumb on the breasts to facilitate emptying.  Collect expressed breastmilk/colostrum with a spoon or cup and feed immediately to the baby, if able.  If unable to feed immediately, place breastmilk/colostrum directly into a sterile storage container for later use. Place the babys breast milk label (with the date and time of collection and the names of mother's medications) on the container. Reviewed proper handling and storage of expressed breastmilk.   Patient effectively return demonstrated and verbalized understanding.     [employer] Symphony pump, tubing, collections containers and labels brought to bedside.  Discussed proper pump setting of initiation phase.  Instructed on proper usage of pump and to adjust  suction according to maximum comfort level.  Verified appropriate flange fit.  Educated on the frequency and duration of pumping in order to promote and maintain a full milk supply.  Hands on pumping technique reviewed.  Encouraged hand expression after pumping.  Instructed on cleaning of breast pump parts.  Written instructions also given.  Pt verbalized understanding and appropriate recall for proper milk handling, collection, labeling, storage and transportation.

## 2022-06-04 NOTE — L&D DELIVERY NOTE
[facility] - Labor & Delivery   Section   Operative Note    SUMMARY     Date of Procedure: 6/3/2022     Procedure: Procedure(s) (LRB):   SECTION (N/A)    Surgeon(s) and Role:     * Orquidea Amaro MD - Primary     * Yeimy Lara MD - Co-Surgeon    Assisting Surgeon: None    Pre-Operative Diagnosis: Fetal heart rate non-reassuring affecting management of mother [O36.8390]    Post-Operative Diagnosis: Post-Op Diagnosis Codes:     * Fetal heart rate non-reassuring affecting management of mother [O36.8390]    Anesthesia: Spinal/Epidural    Technical Procedures Used: 1LTCS           Complications: No    Estimated Blood Loss (EBL): * 800 mL *    Findings: Normal appearing uterus, tubes, and ovaries. Liveborn male infant with APGARs 8/9    Procedure in detail:    The patient was taken to the operating room where epidural/spinal anesthesia was found to be adequate. She was then prepped and draped in the normal sterile fashion. A motley catheter was in place. Adequate anesthesia was then confirmed using allis clamps.    After a Time Out was performed, a scalpel was used to make a Pfannensteil incision. The knife was used to carry down to the underlying layer of fascia. The fascia was then incised in the midline. The curved perrin scissors were then used to extend the fascia incision laterally. The fascia was then elevated using the kocher clamps and extended both inferiorly and superiorly using the curved perrin scissors. The rectus muscles were then  in the midline and the peritoneum was then entered bluntly and extended bluntly and sharply. The bladder blade was then inserted and the vesicouterine peritoneum was identified and incised with the Metzenbaum scissors and extended laterally to create a bladder flap. The bladder blade was then reinserted.     The inside scalpel was then used to make a LOW TRANSVERSE incision in the uterus. This was extended bluntly. The amniotic fluid was noted to be  clear. The infant's head was delivered atraumatically followed by the remainder of the infant's body. The nose and mouth were suctioned with the bulb suction. The cord was clamped and cut and the baby was handed off to awaiting NICU attendant. The placenta was then removed manually and the uterus was then exteriorized and cleared of all clots and debris. The bladder blade was then reinserted.     The uterine incision was then closed using a 0 vicryl suture in a running locked fashion. A second imbricating layer was used to close a second layer.  Atony was noted and additional IV pitocin, IV TXA given.  Additional figure of 8 sutures placed at incision for additional hemostasis.   IM hemabate was also given for continued atony. Additional suture placed at L angle as well for hemostasis.  Continued fundal massage was performed and atony resolved.  The uterus was then returned to the abdomen. The bladder blade was replaced.  Additional figure of 8 suture with vicryl placed at R angle.  The uterine incision was reexamined and noted to have excellent hemostasis.     The peritoneum and rectus muscles were then re approximated using 2-0 Vicryl in a running fashion. The rectus muscles were then irrigated and and bleeding areas were cauterized using the Bovie. When hemostasis was confirmed the fascia was then closed using 0 Vicryl in a running fashion. The subcutaneous tissue was cauterized as needed and hemostasis noted. 2-0 plain gut was then used to re approximate this layer. 4-0 Monocryl was then used to close the skin in a subcuticular fashion. The patient tolerated the procedure well. Sponge lap and needle counts were correct x 2.     Cytotec was placed rectally following the procedure.    A qualified resident was not available for this procedure.        Specimens:   Specimen (24h ago, onward)             Start     Ordered    06/03/22 2129  Specimen to Pathology, Surgery Gynecology and Obstetrics  Once        Comments:  "Pre-op Diagnosis: Fetal heart rate non-reassuring affecting management of mother [O36.8390]Procedure(s): SECTION Number of specimens: 1Name of specimens: placenta     References:    Click here for ordering Quick Tip   Question Answer Comment   Procedure Type: Gynecology and Obstetrics    Specimen Class: Complex case/Special    Which provider would you like to cc? ORQUIDEA AMARO    Release to patient Immediate        22                Condition: Good    Disposition: PACU - hemodynamically stable.    Attestation: Good         Delivery Information for Mark Wells    Birth information:  YOB: 2023   Time of birth: 8:15 PM   Sex: male   Head Delivery Date/Time: 6/3/2022  8:15 PM   Delivery type: , Low Transverse   Gestational Age: 35w1d    Delivery Providers    Delivering clinician: Orquidea Amaro MD   Provider Role    Yeimy Lara MD Co-Surgeon    Amber Mart RN Circulator    ST Eliud Scrub Person    Kira Staton RN Registered Nurse            Measurements    Weight: 2490 g  Weight (lbs): 5 lb 7.8 oz  Length: 46.4 cm  Length (in): 18.25"  Head circumference: 31.8 cm  Chest circumference: 29.2 cm         Apgars    Living status: Living  Apgars:  1 min.:  5 min.:  10 min.:  15 min.:  20 min.:    Skin color:  0  1       Heart rate:  2  2       Reflex irritability:  2  2       Muscle tone:  2  2       Respiratory effort:  2  2       Total:  8  9       Apgars assigned by: NICU         Operative Delivery    Forceps attempted?: No  Vacuum extractor attempted?: No         Shoulder Dystocia    Shoulder dystocia present?: No           Presentation    Presentation: Vertex  Position: Right Occiput Anterior           Interventions/Resuscitation    Method: NICU Attended       Cord    Vessels: 3 vessels  Complications: None  Delayed Cord Clamping?: No  Cord Clamped Date/Time: 6/3/2022  8:10 PM  Cord Blood Disposition: Sent with Baby  Gases Sent?: No  Stem Cell " Collection (by MD): No       Placenta    Placenta delivery date/time: 6/3/2022 2017  Placenta removal: Manual removal  Placenta appearance: Intact  Placenta disposition: discarded           Labor Events:       labor: Yes     Labor Onset Date/Time:         Dilation Complete Date/Time:         Start Pushing Date/Time:         Start Pushing Date/Time:       Rupture Date/Time:            Rupture type:          Fluid Amount:       Fluid Color:       Fluid Odor:       Membrane Status:                steroids: None     Antibiotics given for GBS: No     Induction: balloon dilation (Webster);misoprostol     Indications for induction:  Severe Preeclampsia     Augmentation: oxytocin     Indications for augmentation: Preeclampsia;Ineffective Contraction Pattern     Labor complications: Fetal Intolerance;Failure to Progress in First Stage     Additional complications:          Cervical ripening:                     Delivery:      Episiotomy:       Indication for Episiotomy:       Perineal Lacerations:   Repaired:      Periurethral Laceration:   Repaired:     Labial Laceration:   Repaired:     Sulcus Laceration:   Repaired:     Vaginal Laceration:   Repaired:     Cervical Laceration:   Repaired:     Repair suture:       Repair # of packets:       Last Value - EBL - Nursing (mL):       Sum - EBL - Nursing (mL): 0     Last Value - EBL - Anesthesia (mL):      Calculated QBL (mL): 775      Vaginal Sweep Performed:       Surgicount Correct:         Other providers:       Anesthesia    Method: Epidural          Details (if applicable):  Trial of Labor Yes    Categorization: Primary    Priority: Routine   Indications for : Failed induction;Fetal heart rate abnormalities;Labor dystocia/arrest of active phase of labor   Incision Type: low transverse     Additional  information:  Forceps:    Vacuum:    Breech:    Observed anomalies    Other (Comments):

## 2022-06-04 NOTE — DISCHARGE INSTRUCTIONS
Discharge Instructions    The AAP recommends exclusive breastfeeding for about the first 6 months of age and as solid foods are introduced, continued breastfeeding  for at least 1 year or longer.    Feed the baby at the earliest sign of hunger or comfort (see signs on the back cover of the Mother's Breastfeeding Guide)  Hands to mouth, sucking motions  Rooting or searching for something to suck on  Dont wait for crying - it is a sign of distress    The feedings may be 8-12 times per 24hrs and will not follow a schedule  Avoid pacifiers and bottles for the first 4 weeks  Alternate the breast you start the feeding with, or start with the breast that feels the fullest  Switch breasts when the baby takes himself off the breast or falls asleep  Keep offering breasts until the baby looks full, no longer gives hunger signs, and stays asleep when placed on his back in the crib  If the baby is sleepy and wont wake for a feeding, put the baby skin-to-skin dressed in a diaper against the mothers bare chest  Sleep with your baby in your room near you  The baby should be positioned and latched on to the breast correctly  Chest-to-chest, chin in the breast  Babys lips are flipped outward  Babys mouth is stretched open wide like a shout  Babys sucking should feel like tugging to the mother  The baby should be drinking at the breast:  You should hear swallowing or gulping throughout the feeding  You should see milk on the babys lips when he comes off the breast  Your breasts should be softer when the baby is finished feeding  The baby should look relaxed at the end of feedings  After the 4th day and your milk is in:  The babys poop should turn bright yellow and be loose, watery, and seedy  The baby should have at least 3-4 poops the size of the palm of your hand per day  The baby should have at least 5-6 wet diapers per day  The urine should be light yellow in color  You should drink when you are thirsty and eat a  healthy diet when you are    hungry.     Take naps to get the rest you need.   Take medications and/or drink alcohol only with permission of your obstetrician    or the babys pediatrician.  You can also call the Infant Risk Center,   ([phone]), Monday-Friday, 8am-5pm Central time, to get the most   up-to-date evidence-based information on the use of medications during   pregnancy and breastfeeding.      Complete the First Alert form in the Mother's Breastfeeding Guide 3-5 days after the baby's birth.  Please call the Breastfeeding Warmline ([phone]) or the baby's pediatrician if you have any concerns.    The baby should be examined by a pediatrician at 3-5 days of age and again at 2 weeks of age.  Once your milk production increases, the baby should be gaining at least ½ - 1oz each day and should be back to birthweight no later than 10-14 days of age.  If this is not the case, please call the Breastfeeding Warmline ([phone]) for assistance and support.    Community Resources    [facility] Breastfeeding Warmline: [phone]   Local [facility] clinics: provide incentives and breastpumps to eligible mothers 8-057-041-BABY  La Leche League International (LLLI):  mother-to-mother support group website        www.lll.StratusLIVE  [facility] La Leche League mother-to-mother support groups:        www.lllMuscleGenes.YouBeQB        La Leche League [facility]   Dr. Jona Mitchell website for latch videos and general information:        www.breastfeedinginc.ca  [facility] Risk [facility] is a call center that provides information about the safety of taking medications while breastfeeding.  Call [phone], M-F, 8am-5pm, [place].  International Lactation Consultant Association provides resources for assistance:        www.ilca.org  [place] Breastfeeding Coalition provides informationand resources for parents  and the community    www.LaBreastfeedingSupport.org  Winnie Salinas is a mom-to-mom support group:                              www.nolanesting.com//breastfeedng-support/  [employer] for Healthy Babies:  0-826-355-BABY(7511)  [employer]: a breastfeeding support group for women of color, [phone]

## 2022-06-04 NOTE — ANESTHESIA POSTPROCEDURE EVALUATION
Anesthesia Post Evaluation    Patient: Cherrie Wells    Procedure(s) Performed: * No procedures listed *    Final Anesthesia Type: epidural      Patient location during evaluation: med/surg floor  Patient participation: Yes- Able to Participate  Level of consciousness: awake and alert and oriented  Post-procedure vital signs: reviewed and stable  Pain management: adequate  Airway patency: patent    PONV status at discharge: No PONV  Anesthetic complications: no      Cardiovascular status: hemodynamically stable  Respiratory status: unassisted, spontaneous ventilation and room air  Hydration status: euvolemic  Follow-up not needed.          Vitals Value Taken Time   /67 06/04/22 1402   Temp 36.6 °C (97.9 °F) 06/04/22 1300   Pulse 84 06/04/22 1417   Resp 17 06/04/22 1300   SpO2 98 % 06/04/22 1417   Vitals shown include unvalidated device data.      No case tracking events are documented in the log.      Pain/Alondra Score: Pain Rating Prior to Med Admin: 2 (6/4/2022 11:51 AM)

## 2022-06-04 NOTE — PLAN OF CARE
Patient safety maintained, side rails up, bed low and locked position. Webster in place, not ambulating at this time.SCDs in place. Pain well controlled with scheduled and PRN pain medication. VSS. Intake and out managed. Pt receiving continuous IV fluids of Mag/LR as ordered. Patient has denied any visual changes, RUQ pain, or headaches. Fundus midline, firm, with moderate lochia. Parents responding to infant cues. Incision site dressed; dressing clean, dry, and intact. Significant other at bedside and assisting in patient's care.

## 2022-06-05 PROCEDURE — 11000001 HC ACUTE MED/SURG PRIVATE ROOM

## 2022-06-05 PROCEDURE — 25000003 PHARM REV CODE 250: Performed by: STUDENT IN AN ORGANIZED HEALTH CARE EDUCATION/TRAINING PROGRAM

## 2022-06-05 PROCEDURE — 25000003 PHARM REV CODE 250: Performed by: OBSTETRICS & GYNECOLOGY

## 2022-06-05 PROCEDURE — 25000003 PHARM REV CODE 250

## 2022-06-05 PROCEDURE — 99024 POSTOP FOLLOW-UP VISIT: CPT | Mod: ,,, | Performed by: OBSTETRICS & GYNECOLOGY

## 2022-06-05 PROCEDURE — 99024 PR POST-OP FOLLOW-UP VISIT: ICD-10-PCS | Mod: ,,, | Performed by: OBSTETRICS & GYNECOLOGY

## 2022-06-05 RX ORDER — OXYCODONE AND ACETAMINOPHEN 5; 325 MG/1; MG/1
1 TABLET ORAL EVERY 4 HOURS PRN
Status: DISCONTINUED | OUTPATIENT
Start: 2022-06-05 | End: 2022-06-08 | Stop reason: HOSPADM

## 2022-06-05 RX ORDER — OXYCODONE AND ACETAMINOPHEN 10; 325 MG/1; MG/1
1 TABLET ORAL EVERY 4 HOURS PRN
Status: DISCONTINUED | OUTPATIENT
Start: 2022-06-05 | End: 2022-06-08 | Stop reason: HOSPADM

## 2022-06-05 RX ADMIN — DOCUSATE SODIUM 200 MG: 100 CAPSULE, LIQUID FILLED ORAL at 08:06

## 2022-06-05 RX ADMIN — Medication 650 MG: at 11:06

## 2022-06-05 RX ADMIN — LABETALOL HYDROCHLORIDE 200 MG: 200 TABLET, FILM COATED ORAL at 08:06

## 2022-06-05 RX ADMIN — PRENATAL VIT W/ FE FUMARATE-FA TAB 27-0.8 MG 1 TABLET: 27-0.8 TAB at 08:06

## 2022-06-05 RX ADMIN — IBUPROFEN 800 MG: 400 TABLET, FILM COATED ORAL at 05:06

## 2022-06-05 RX ADMIN — OXYCODONE AND ACETAMINOPHEN 1 TABLET: 10; 325 TABLET ORAL at 09:06

## 2022-06-05 RX ADMIN — IBUPROFEN 800 MG: 400 TABLET, FILM COATED ORAL at 02:06

## 2022-06-05 RX ADMIN — OXYCODONE AND ACETAMINOPHEN 1 TABLET: 10; 325 TABLET ORAL at 03:06

## 2022-06-05 RX ADMIN — NIFEDIPINE 90 MG: 30 TABLET, FILM COATED, EXTENDED RELEASE ORAL at 08:06

## 2022-06-05 RX ADMIN — Medication 650 MG: at 05:06

## 2022-06-05 RX ADMIN — ESCITALOPRAM OXALATE 20 MG: 10 TABLET ORAL at 08:06

## 2022-06-05 RX ADMIN — IBUPROFEN 800 MG: 400 TABLET, FILM COATED ORAL at 09:06

## 2022-06-05 RX ADMIN — OXYCODONE AND ACETAMINOPHEN 1 TABLET: 10; 325 TABLET ORAL at 02:06

## 2022-06-05 NOTE — LACTATION NOTE
Lactation visited pt to discuss baby feedings were progressing. Mom states she is pumping and feeding the baby donor milk and any colostrum she can harvest. She is doing skin to skin and occasionally letting the baby latch to the breast if he is showing cues. Pt asked to call LC for the next feeding to assist with latch and evaluate the feeding.

## 2022-06-05 NOTE — LACTATION NOTE
Lactation visited pt. She has not attempted the breast since earlier because she has not felt well. She was currently feeding the baby donor milk with the syringe and plans to pump after the feeding.

## 2022-06-05 NOTE — PROGRESS NOTES
Postoperative Day #2 s/p primary LTCD for nonreassuring FHR in early labor during induction due to Chronic HTN with superimposed preeclampsia with severe features at 35 wga.      No complaints, Doing well. Minimal vaginal bleeding. Pain is well controlled. Swelling has decreased. Overall doing well. Baby is doing well, getting donor milk and maternal colostrum.  S/p Magnesium    Vitals:    22 0811   BP: (!) 154/97   Pulse: (!) 59   Resp: 18   Temp: 98.8 °F (37.1 °C)   UOP: 5000 ml +    Physical Exam-  General- Alert & oriented x 3, no distress  Abdomen- Soft Non tender; non distended  Incision- intact, healing well, no sign of infection; gel bandage on  Fundus- Firm, below the umbilicus  Lochia- Minimal  Extremities: warm, 1+ edema; nontender    Lab Results   Component Value Date    WBC 12.27 2022    HGB 9.7 (L) 2022    HCT 28.5 (L) 2022    MCV 94 2022     2022     Med:   Nifedipine 90 XL daily; Labetalol 200 mg PO BID; Lexapro 20 mg    A-  S/P  Delivery    CHTN with superimposed Preelampsia - stable   Anxiety - stable   Hx opioid use in remission  P-  Continue Routine Postpartum Care   Monitor BPs and titrate up medications as needed   Lactation support   Baby NOT cleared for circumcision due to penile webbing/short shaft        Plan for D/C POD 3-4

## 2022-06-05 NOTE — PLAN OF CARE
Ambulating independently; voiding without difficulty; light rubra, no clots; fundus firm, midline, down 1 cm; pain controlled with around the clock dosing and prn pain medication; occasional use of double electric breast pump today; education provided/review of Mother Baby Care Guide book and Breast feeding logbook; Patient bonding well with infant. Will continue to monitor and assist as needed.

## 2022-06-05 NOTE — PLAN OF CARE
Plan of care reviewed with patient at bedside, questions answered and support provided - pt verbalized understanding of POC.  Mag discontinued over night. Fundus is firm and midline, bleeding light. Incision dressing clean, dry, and intact. Pain well controlled with ordered pain medications. Ambulating independently. Voiding spontaneously. No acute changes over night. Bonding appropriately with new born. Breast feeding with minimal assistance. See flow sheets for more details.

## 2022-06-05 NOTE — NURSING
Dr. Caballero notified of patients BP @ 0430 of 157/100 and repeat of 165/94 @ 0451. Per MD recheck in 15 more minutes following 0451 reading.

## 2022-06-06 PROCEDURE — 25000003 PHARM REV CODE 250: Performed by: OBSTETRICS & GYNECOLOGY

## 2022-06-06 PROCEDURE — 25000003 PHARM REV CODE 250: Performed by: STUDENT IN AN ORGANIZED HEALTH CARE EDUCATION/TRAINING PROGRAM

## 2022-06-06 PROCEDURE — 11000001 HC ACUTE MED/SURG PRIVATE ROOM

## 2022-06-06 PROCEDURE — 25000003 PHARM REV CODE 250

## 2022-06-06 PROCEDURE — 63600175 PHARM REV CODE 636 W HCPCS: Performed by: OBSTETRICS & GYNECOLOGY

## 2022-06-06 RX ORDER — NIFEDIPINE 10 MG/1
10 CAPSULE ORAL ONCE
Status: COMPLETED | OUTPATIENT
Start: 2022-06-06 | End: 2022-06-06

## 2022-06-06 RX ORDER — LABETALOL 100 MG/1
100 TABLET, FILM COATED ORAL ONCE
Status: COMPLETED | OUTPATIENT
Start: 2022-06-06 | End: 2022-06-06

## 2022-06-06 RX ORDER — FUROSEMIDE 10 MG/ML
20 INJECTION INTRAMUSCULAR; INTRAVENOUS ONCE
Status: COMPLETED | OUTPATIENT
Start: 2022-06-06 | End: 2022-06-06

## 2022-06-06 RX ORDER — FUROSEMIDE 20 MG/1
20 TABLET ORAL DAILY
Status: DISCONTINUED | OUTPATIENT
Start: 2022-06-07 | End: 2022-06-08 | Stop reason: HOSPADM

## 2022-06-06 RX ADMIN — ESCITALOPRAM OXALATE 20 MG: 10 TABLET ORAL at 08:06

## 2022-06-06 RX ADMIN — NIFEDIPINE 10 MG: 10 CAPSULE ORAL at 11:06

## 2022-06-06 RX ADMIN — LABETALOL HYDROCHLORIDE 200 MG: 200 TABLET, FILM COATED ORAL at 08:06

## 2022-06-06 RX ADMIN — FUROSEMIDE 20 MG: 10 INJECTION, SOLUTION INTRAMUSCULAR; INTRAVENOUS at 09:06

## 2022-06-06 RX ADMIN — SIMETHICONE 80 MG: 80 TABLET, CHEWABLE ORAL at 09:06

## 2022-06-06 RX ADMIN — OXYCODONE HYDROCHLORIDE AND ACETAMINOPHEN 1 TABLET: 5; 325 TABLET ORAL at 09:06

## 2022-06-06 RX ADMIN — LABETALOL HYDROCHLORIDE 300 MG: 200 TABLET, FILM COATED ORAL at 03:06

## 2022-06-06 RX ADMIN — NIFEDIPINE 10 MG: 10 CAPSULE ORAL at 09:06

## 2022-06-06 RX ADMIN — OXYCODONE HYDROCHLORIDE AND ACETAMINOPHEN 1 TABLET: 5; 325 TABLET ORAL at 11:06

## 2022-06-06 RX ADMIN — LABETALOL HYDROCHLORIDE 300 MG: 200 TABLET, FILM COATED ORAL at 08:06

## 2022-06-06 RX ADMIN — IBUPROFEN 800 MG: 400 TABLET, FILM COATED ORAL at 05:06

## 2022-06-06 RX ADMIN — PRENATAL VIT W/ FE FUMARATE-FA TAB 27-0.8 MG 1 TABLET: 27-0.8 TAB at 08:06

## 2022-06-06 RX ADMIN — NIFEDIPINE 90 MG: 30 TABLET, FILM COATED, EXTENDED RELEASE ORAL at 08:06

## 2022-06-06 RX ADMIN — LABETALOL HYDROCHLORIDE 100 MG: 100 TABLET, FILM COATED ORAL at 11:06

## 2022-06-06 RX ADMIN — DOCUSATE SODIUM 200 MG: 100 CAPSULE, LIQUID FILLED ORAL at 08:06

## 2022-06-06 RX ADMIN — IBUPROFEN 800 MG: 400 TABLET, FILM COATED ORAL at 02:06

## 2022-06-06 RX ADMIN — IBUPROFEN 800 MG: 400 TABLET, FILM COATED ORAL at 09:06

## 2022-06-06 NOTE — PLAN OF CARE
Plan of care reviewed with patient at bedside, questions answered and support provided - pt verbalized understanding of POC. VSS over night. Fundus is firm and midline, bleeding light moderate. Incision clean, dry, and intact. Pain well controlled with ordered pain medications. Ambulating independently. Voiding spontaneously. No acute changes over night. Bonding appropriately with new born. Breast feeding with minimal assistance. See flow sheets for more details.

## 2022-06-06 NOTE — LACTATION NOTE
06/06/22 0850   Maternal Infant Feeding   Maternal Emotional State independent;relaxed   Equipment Type   Breast Pump Type double electric, hospital grade   Breast Pump Flange Type hard   Breast Pump Flange Size 24 mm   Breast Pumping   Breast Pumping Interventions frequent pumping encouraged   Lactation Referrals   Lactation Referrals outpatient lactation program;pediatric care provider   Situation: continuity of lactation care, breastfeeding difficulty, opted to pump and supplement with donors milk for now until milk comes in     Background: day 3 postpartum    Assessment:   Pt Mom- plans to breastfeed, pump and bottle  Pt Baby- finished feeding,a sleep and comfortable, weight loss -8.84%     Actions:   1.  Reviewed basics of breastfeeding and managing breastfeeding difficulties, milk supply management and transitional breastfeeding with possible use of nipple shields as needed temporarily.  2.  Latch assistance offered, to call as needed.  Promoted even NNS for spitwash benefit of breastfeeding  3.  Support provided and encouraged to call LC as needed.   4.  Discussed discharge plans, pump rental prior discharge  5.  Encourage to follow up at peds clinic for weight check. Supplemental feeds to continue- adequate amount on paced bottle feeding technique    Results: pt agrees to the plan of feeding LC number on board, pt to call.

## 2022-06-06 NOTE — PROGRESS NOTES
POSTPARTUM PROGRESS NOTE    Subjective:     PPD/POD#: 3   Procedure: Primary LTCS   EGA: 35w1d   N/V: No   F/C: No   Abd Pain: Mild, well-controlled with oral pain medication   Lochia: Mild   Voiding: Yes   Ambulating: Yes   Bowel fnc: Yes   Breastfeeding: Yes   Contraception: Per primary OB   Circumcision: Declined by OB attending     Objective:      Temp:  [96.5 °F (35.8 °C)-98.8 °F (37.1 °C)] 98.6 °F (37 °C)  Pulse:  [69-97] 89  Resp:  [16-20] 18  SpO2:  [94 %-99 %] 96 %  BP: (121-183)/() 124/82    Lung: Normal respiratory effort   Abdomen: Soft, appropriately tender   Uterus: Firm, no fundal tenderness   Incision: Bandage in place without shadowing   : Deferred   Extremities: Bilateral 2+ edema, non-tender     Lab Review    Recent Labs   Lab 06/02/22  1219      K 4.3      CO2 19*   BUN 11   CREATININE 0.6   *   PROT 6.0   BILITOT 0.3   ALKPHOS 181*   ALT 17   AST 19       Recent Labs   Lab 06/02/22  1219 06/04/22  0441   WBC 7.95 12.27   HGB 11.4* 9.7*   HCT 33.3* 28.5*   MCV 93 94    188         I/O  No intake or output data in the 24 hours ending 06/06/22 1010     Assessment and Plan:   Postpartum care:  - Patient doing well.  - Continue routine management and advances.    cHTN w/JEREL w/SF  - BP as above  - asymptomatic  - preE labs as above  - UOP: adequate  - Mag: completed  - Hypertensive agent Procardia XL 90mg po qd, increased labetalol to 300mg po bid today, Lasix 20mg IV x 1 today, will start po lasix 20mg tomorrow, given 10mg procardia today for severe range Bps at 0933.    Mood Disorder  - Mood stable  - Medications: Escitalopram  - Will need 1-2 week postpartum mood check     Anemia acute blood loss  - H/H as above  - QBL: 775 mL  - asymptomatic  - iron/colace      Kailey Caballero

## 2022-06-06 NOTE — NURSING
Dr. Caballero notified of BP at 0819 183/97 and again at 0906 169/104; patient had received both Procardia and Labetalol at 0835 as per order.  Orders received to give Procardia 10mg PO x1 now and Lasix 20mg IVP x1 now; will continue to monitor closely.

## 2022-06-07 PROCEDURE — 25000003 PHARM REV CODE 250: Performed by: STUDENT IN AN ORGANIZED HEALTH CARE EDUCATION/TRAINING PROGRAM

## 2022-06-07 PROCEDURE — 99024 PR POST-OP FOLLOW-UP VISIT: ICD-10-PCS | Mod: ,,, | Performed by: OBSTETRICS & GYNECOLOGY

## 2022-06-07 PROCEDURE — 25000003 PHARM REV CODE 250: Performed by: OBSTETRICS & GYNECOLOGY

## 2022-06-07 PROCEDURE — 11000001 HC ACUTE MED/SURG PRIVATE ROOM

## 2022-06-07 PROCEDURE — 25000003 PHARM REV CODE 250

## 2022-06-07 PROCEDURE — 99024 POSTOP FOLLOW-UP VISIT: CPT | Mod: ,,, | Performed by: OBSTETRICS & GYNECOLOGY

## 2022-06-07 RX ORDER — LANOLIN ALCOHOL/MO/W.PET/CERES
1 CREAM (GRAM) TOPICAL DAILY
Status: DISCONTINUED | OUTPATIENT
Start: 2022-06-07 | End: 2022-06-08 | Stop reason: HOSPADM

## 2022-06-07 RX ORDER — NIFEDIPINE 10 MG/1
10 CAPSULE ORAL ONCE
Status: COMPLETED | OUTPATIENT
Start: 2022-06-07 | End: 2022-06-07

## 2022-06-07 RX ORDER — NIFEDIPINE 30 MG/1
30 TABLET, EXTENDED RELEASE ORAL NIGHTLY
Status: DISCONTINUED | OUTPATIENT
Start: 2022-06-07 | End: 2022-06-08 | Stop reason: HOSPADM

## 2022-06-07 RX ADMIN — DOCUSATE SODIUM 200 MG: 100 CAPSULE, LIQUID FILLED ORAL at 08:06

## 2022-06-07 RX ADMIN — SIMETHICONE 80 MG: 80 TABLET, CHEWABLE ORAL at 10:06

## 2022-06-07 RX ADMIN — OXYCODONE HYDROCHLORIDE AND ACETAMINOPHEN 1 TABLET: 5; 325 TABLET ORAL at 10:06

## 2022-06-07 RX ADMIN — FUROSEMIDE 20 MG: 20 TABLET ORAL at 08:06

## 2022-06-07 RX ADMIN — IBUPROFEN 800 MG: 400 TABLET, FILM COATED ORAL at 05:06

## 2022-06-07 RX ADMIN — FERROUS SULFATE TAB 325 MG (65 MG ELEMENTAL FE) 1 EACH: 325 (65 FE) TAB at 03:06

## 2022-06-07 RX ADMIN — NIFEDIPINE 30 MG: 30 TABLET, FILM COATED, EXTENDED RELEASE ORAL at 09:06

## 2022-06-07 RX ADMIN — LABETALOL HYDROCHLORIDE 300 MG: 200 TABLET, FILM COATED ORAL at 04:06

## 2022-06-07 RX ADMIN — IBUPROFEN 800 MG: 400 TABLET, FILM COATED ORAL at 03:06

## 2022-06-07 RX ADMIN — DOCUSATE SODIUM 200 MG: 100 CAPSULE, LIQUID FILLED ORAL at 09:06

## 2022-06-07 RX ADMIN — OXYCODONE AND ACETAMINOPHEN 1 TABLET: 10; 325 TABLET ORAL at 06:06

## 2022-06-07 RX ADMIN — PRENATAL VIT W/ FE FUMARATE-FA TAB 27-0.8 MG 1 TABLET: 27-0.8 TAB at 08:06

## 2022-06-07 RX ADMIN — NIFEDIPINE 10 MG: 10 CAPSULE ORAL at 09:06

## 2022-06-07 RX ADMIN — LABETALOL HYDROCHLORIDE 300 MG: 200 TABLET, FILM COATED ORAL at 09:06

## 2022-06-07 RX ADMIN — ESCITALOPRAM OXALATE 20 MG: 10 TABLET ORAL at 08:06

## 2022-06-07 RX ADMIN — LABETALOL HYDROCHLORIDE 300 MG: 200 TABLET, FILM COATED ORAL at 08:06

## 2022-06-07 RX ADMIN — NIFEDIPINE 90 MG: 30 TABLET, FILM COATED, EXTENDED RELEASE ORAL at 08:06

## 2022-06-07 NOTE — PLAN OF CARE
Plan of care reviewed with patient at bedside, questions answered and support provided - pt verbalized understanding of POC. One high pressure over night. Fundus is firm and midline, bleeding light. Incision clean, dry, and intact. Pain well controlled with ordered pain medications. Ambulating independently. Voiding spontaneously. No acute changes over night. Bonding appropriately with new born. Breast feeding with minimal assistance. See flow sheets for more details.

## 2022-06-07 NOTE — PROGRESS NOTES
POSTPARTUM PROGRESS NOTE    Subjective:     PPD/POD#: 4   Procedure: Primary LTCS   EGA: 35w1d   N/V: No   F/C: No   Abd Pain: Mild, well-controlled with oral pain medication   Lochia: Mild   Voiding: Yes   Ambulating: Yes   Bowel fnc: Yes   Breastfeeding: Yes   Contraception: Per primary OB   Circumcision: Declined by OB attending     Objective:      Temp:  [97.6 °F (36.4 °C)-99 °F (37.2 °C)] 97.6 °F (36.4 °C)  Pulse:  [72-92] 89  Resp:  [16-20] 18  SpO2:  [92 %-98 %] 97 %  BP: (125-172)/() 136/89    Lung: Normal respiratory effort   Abdomen: Soft, appropriately tender   Uterus: Firm, no fundal tenderness   Incision: Bandage in place without shadowing   : Deferred   Extremities: Bilateral trace edema     Lab Review    Recent Labs   Lab 06/02/22  1219      K 4.3      CO2 19*   BUN 11   CREATININE 0.6   *   PROT 6.0   BILITOT 0.3   ALKPHOS 181*   ALT 17   AST 19       Recent Labs   Lab 06/02/22  1219 06/04/22  0441   WBC 7.95 12.27   HGB 11.4* 9.7*   HCT 33.3* 28.5*   MCV 93 94    188         I/O  No intake or output data in the 24 hours ending 06/07/22 1107     Assessment and Plan:   Postpartum care:  - Patient doing well.  - Continue routine management and advances.    cHTN w/JEREL w/SF  - BP as above  - asymptomatic  - preE labs as above  - Mag: completed  - Hypertensive agent procardia 90mg XL in AM and labetalol 300mg TID added yesterday.  Severe range this morning requiring procardia 10mg po once with then normal BP.  Procardia 30mg XL added for PM dose.  Po lasix 20mg daily started today. Will keep patient inpatient to continue BP management and monitoring. Asymptomatic currently but continued severe ranges despite escalation of BP meds.    Mood Disorder  - Mood stable  - Medications: Escitalopram  - Will need 1-2 week postpartum mood check     Anemia acute blood loss  - H/H as above  - QBL: 775 mL  - asymptomatic  - iron/colace    GDMA1  -fasting 90  -6 wk 2hr GTT    Jodie WILLIS  Gutierrez

## 2022-06-07 NOTE — PLAN OF CARE
VSS, Ambulating independently, voiding without difficulty, pain controlled with around the clock dosing and abdominal binder provided; silicone dressing clean, dry and intact;  Patient bonding well with infant.

## 2022-06-07 NOTE — OPERATIVE NOTE ADDENDUM
Certification of Assistant at Surgery       Surgery Date: 6/3/2022     Participating Surgeons:  Surgeon(s) and Role:     * Orquidea Amaro MD - Primary     * Yeimy Lara MD - Assisting surgeon    Procedures:  Procedure(s) (LRB):   SECTION (N/A)    Assistant Surgeon's Certification of Necessity:  I understand that section 1842 (b) (6) (d) of the Social Security Act generally prohibits Medicare Part B reasonable charge payment for the services of assistants at surgery in [facility] hospitals when qualified residents are available to furnish such services. I certify that the services for which payment is claimed were medically necessary, and that no qualified resident was available to perform the services. I further understand that these services are subject to post-payment review by the Medicare carrier.      Yeimy Lara MD    2022  6:44 AM

## 2022-06-07 NOTE — NURSING
Dr. Gutierrez notified of BP of 156/116 at 0844 and repeated at 0904 172/108; orders received to give Procardia 10mg PO x1 now and repeat BP in 15 minutes. Will continue to monitor closely.

## 2022-06-07 NOTE — PROGRESS NOTES
06/06/22 2301 06/06/22 2324 06/07/22 0005   Vitals   Pulse 83 83 72   Resp 17  --   --    BP (!) 162/94 (!) 172/94 (!) 142/98   MAP (mmHg) 122 126 115   BP Location Right arm Right arm  --    SpO2 (!) 92 %  --   --    2325: MD notified of severe range pressure. Per MD, administer PO Procardia 10 mg and recheck BP in 20 minutes.   0005: BP repeat WDL

## 2022-06-07 NOTE — PLAN OF CARE
Copied from infants chart:     SOCIAL WORK DISCHARGE PLANNING ASSESSMENT      Legal Name: Bennie Hatfield  :  6/3/2023  [address] [address]  Parent's Phone Numbers: Cherrie [phone]     Sw completed discharge planning assessment with pt's parents in mother's room 602.  Pt's parents were easily engaged. SW verified infants demographic information and completed family assessment. Mother and Father are in a relationship. Family has essential items for infant. Family has good community support and is appropriate with infant cares. LMSW talked to mother about social history. Mother states she has a hx of depression and is seen by a private psychiatrist: Ana Paula Quarles MD. Mother was previously on suboxone for 5-6 years managed by her psychiatrist and PCP Dr. Brent Mccartney. Patient was weaned off suboxone in 2021. Infants urine toxic screen is negative. SW informed mother if infants meconium is positive a physician notification for a drug exposed  will be completed and sent to DCFS. SW also explained that mothers MD's will be contacted. Mother expressed understanding. Consult addressed.     Father will transport infant and  mother home at discharge.       Patient Active Problem List   Diagnosis      infant of 35 completed weeks of gestation    Liveborn infant, born in hospital, delivered by     Cup ear deformity    Pyelectasis    Penoscrotal webbing          22 1405   OB Discharge Planning Assessment   Assessment Type Discharge Planning Assessment   Source of Information family   Verified Demographic and Insurance Information Yes   Insurance Commercial   [facility]   Guarantor Mother   Lives With parent(s)   Number people in home 3 inclduing patient   Relationship Status Engaged   Employed Full Time   Currently Enrolled in School No   Father's Involvement Fully Involved   Is Father signing the birth certificate Yes    Father's Address same as mothers   Father Currently Enrolled in School No   Received Prenatal Care Yes   Transportation Anticipated car, drives self;family or friend will provide   Receive [facility] Benefits N/A    Arrangements Family   Does baby have crib or safe sleep space? Yes   Do you have a car seat? Yes   Has other essential care items? Clothing;Bottles;Diapers   Pediatrician Dr. Santiago Drew   Resource/Environmental Concerns none   Equipment Currently Used at Home none   DCFS No indications (Indicators for Report)   Discharge Plan A Home with family

## 2022-06-07 NOTE — PLAN OF CARE
Visited patient in room, holding sleeping baby in arms, states baby most recently fed at 1010.  States she is not being discharged today due to elevated BP's.  States her breasts are beginning to feel heavier today.  Washed double collection kit, air drying. Reviewed POC:  mother will feed baby on cue but not longer than q 3hrs; will nurse baby as long as baby actively sucks and drinks; FOB will Paced bottle feed baby EBM/formula ad herbert while mother double pumps breasts using the Initiation/Maintain pumping pattern c the most suction that is comfortable; father will wash and air dry kit after each use; will monitor baby's 24hr diaper counts; will increase rest today; will call for assistance prn.

## 2022-06-08 ENCOUNTER — TELEPHONE (OUTPATIENT)
Dept: OBSTETRICS AND GYNECOLOGY | Facility: CLINIC | Age: 32
End: 2022-06-08
Payer: COMMERCIAL

## 2022-06-08 VITALS
HEART RATE: 86 BPM | SYSTOLIC BLOOD PRESSURE: 135 MMHG | OXYGEN SATURATION: 93 % | HEIGHT: 62 IN | DIASTOLIC BLOOD PRESSURE: 89 MMHG | BODY MASS INDEX: 35.14 KG/M2 | WEIGHT: 190.94 LBS | RESPIRATION RATE: 18 BRPM | TEMPERATURE: 98 F

## 2022-06-08 PROCEDURE — 25000003 PHARM REV CODE 250: Performed by: OBSTETRICS & GYNECOLOGY

## 2022-06-08 PROCEDURE — 25000003 PHARM REV CODE 250: Performed by: STUDENT IN AN ORGANIZED HEALTH CARE EDUCATION/TRAINING PROGRAM

## 2022-06-08 PROCEDURE — 25000003 PHARM REV CODE 250

## 2022-06-08 RX ORDER — IBUPROFEN 800 MG/1
800 TABLET ORAL 3 TIMES DAILY
Qty: 30 TABLET | Refills: 1 | Status: SHIPPED | OUTPATIENT
Start: 2022-06-08

## 2022-06-08 RX ORDER — OXYCODONE AND ACETAMINOPHEN 5; 325 MG/1; MG/1
1 TABLET ORAL EVERY 4 HOURS PRN
Qty: 30 TABLET | Refills: 0 | Status: SHIPPED | OUTPATIENT
Start: 2022-06-08

## 2022-06-08 RX ORDER — FUROSEMIDE 20 MG/1
20 TABLET ORAL DAILY
Qty: 30 TABLET | Refills: 11 | Status: SHIPPED | OUTPATIENT
Start: 2022-06-09 | End: 2023-06-09

## 2022-06-08 RX ORDER — LABETALOL 300 MG/1
300 TABLET, FILM COATED ORAL 3 TIMES DAILY
Qty: 90 TABLET | Refills: 11 | Status: SHIPPED | OUTPATIENT
Start: 2022-06-08 | End: 2023-06-08

## 2022-06-08 RX ORDER — NIFEDIPINE 30 MG/1
30 TABLET, EXTENDED RELEASE ORAL NIGHTLY
Qty: 30 TABLET | Refills: 11 | Status: SHIPPED | OUTPATIENT
Start: 2022-06-08 | End: 2023-06-08

## 2022-06-08 RX ADMIN — OXYCODONE AND ACETAMINOPHEN 1 TABLET: 10; 325 TABLET ORAL at 11:06

## 2022-06-08 RX ADMIN — OXYCODONE AND ACETAMINOPHEN 1 TABLET: 10; 325 TABLET ORAL at 07:06

## 2022-06-08 RX ADMIN — NIFEDIPINE 90 MG: 30 TABLET, FILM COATED, EXTENDED RELEASE ORAL at 09:06

## 2022-06-08 RX ADMIN — PRENATAL VIT W/ FE FUMARATE-FA TAB 27-0.8 MG 1 TABLET: 27-0.8 TAB at 09:06

## 2022-06-08 RX ADMIN — LABETALOL HYDROCHLORIDE 300 MG: 200 TABLET, FILM COATED ORAL at 09:06

## 2022-06-08 RX ADMIN — DOCUSATE SODIUM 200 MG: 100 CAPSULE, LIQUID FILLED ORAL at 09:06

## 2022-06-08 RX ADMIN — FUROSEMIDE 20 MG: 20 TABLET ORAL at 09:06

## 2022-06-08 RX ADMIN — IBUPROFEN 800 MG: 400 TABLET, FILM COATED ORAL at 02:06

## 2022-06-08 RX ADMIN — ESCITALOPRAM OXALATE 20 MG: 10 TABLET ORAL at 09:06

## 2022-06-08 RX ADMIN — IBUPROFEN 800 MG: 400 TABLET, FILM COATED ORAL at 10:06

## 2022-06-08 RX ADMIN — FERROUS SULFATE TAB 325 MG (65 MG ELEMENTAL FE) 1 EACH: 325 (65 FE) TAB at 09:06

## 2022-06-08 RX ADMIN — OXYCODONE AND ACETAMINOPHEN 1 TABLET: 10; 325 TABLET ORAL at 02:06

## 2022-06-08 NOTE — DISCHARGE SUMMARY
Delivery Discharge Summary  Obstetrics      Primary OB Clinician: Orquidea Amaro MD      Admission date: 2022  Discharge date: 2022    Disposition: To home, self care    Discharge Diagnosis List:      Patient Active Problem List   Diagnosis    Hypertension affecting pregnancy in second trimester    Pyelectasis of fetus on prenatal ultrasound    Chronic hypertension    Diet controlled gestational diabetes mellitus (GDM) in third trimester    Severe pre-eclampsia in third trimester    Chronic hypertension with superimposed pre-eclampsia    Status post primary low transverse  section       Procedure: , due to NRFHRT    Hospital Course:  Cherrie Wells is a 32 y.o. now , POD #5 who was admitted on 2022 at 35w1d for contractions and severe preeclampsia. Patient was subsequently admitted to labor and delivery unit with signed consents.  Patient was admitted and started on IV magnesium sulfate was started for seizure prophylaxis.    Labor course was complicated by NRFHRT, and decision was made to proceed with delivery via  which was performed without complications.    Please see delivery note for further details. Her postpartum course was complicated by continued severe range BPs. On discharge day, patient's pain is controlled with oral pain medications. Pt is tolerating ambulation without SOB or CP, and regular diet without N/V. Reports lochia is mild. Denies any HA, vision changes, F/C, LE swelling. Denies any breast pain/soreness.  HTN meds adjusted, see regimen below.    Pt in stable condition and ready for discharge. She has been instructed to start and/or continue medications and follow up with her obstetrics provider as listed below.    Pertinent studies:  CBC  Recent Labs   Lab 22  1219 22  0441   WBC 7.95 12.27   HGB 11.4* 9.7*   HCT 33.3* 28.5*   MCV 93 94    188          Immunization History   Administered Date(s) Administered     COVID-19, MRNA, LN-S, PF (Pfizer) (Purple Cap) 2021, 2021    Tdap 2022        Delivery:    Episiotomy:     Lacerations:     Repair suture:     Repair # of packets:     Blood loss (ml):       Birth information:  YOB: 2023   Time of birth: 8:15 PM   Sex: male   Delivery type: , Low Transverse   Gestational Age: 35w1d    Delivery Clinician:      Other providers:       Additional  information:  Forceps:    Vacuum:    Breech:    Observed anomalies      Living?:           APGARS  One minute Five minutes Ten minutes   Skin color:         Heart rate:         Grimace:         Muscle tone:         Breathing:         Totals: 8  9        Placenta: Delivered:       appearance      Patient Instructions:   Current Discharge Medication List      START taking these medications    Details   furosemide (LASIX) 20 MG tablet Take 1 tablet (20 mg total) by mouth once daily.  Qty: 30 tablet, Refills: 11      ibuprofen (ADVIL,MOTRIN) 800 MG tablet Take 1 tablet (800 mg total) by mouth 3 (three) times daily.  Qty: 30 tablet, Refills: 1      !! NIFEdipine (PROCARDIA-XL) 30 MG (OSM) 24 hr tablet Take 1 tablet (30 mg total) by mouth every evening.  Qty: 30 tablet, Refills: 11    Comments: .      oxyCODONE-acetaminophen (PERCOCET) 5-325 mg per tablet Take 1 tablet by mouth every 4 (four) hours as needed for Pain.  Qty: 30 tablet, Refills: 0       !! - Potential duplicate medications found. Please discuss with provider.      CONTINUE these medications which have CHANGED    Details   labetaloL (NORMODYNE) 300 MG tablet Take 1 tablet (300 mg total) by mouth 3 (three) times daily.  Qty: 90 tablet, Refills: 11    Comments: .         CONTINUE these medications which have NOT CHANGED    Details   aspirin 81 MG Chew Take 81 mg by mouth once daily.      blood sugar diagnostic Strp 1 each by Misc.(Non-Drug; Combo Route) route 4 (four) times daily.  Qty: 120 strip, Refills: 3      blood-glucose meter kit Use  as instructed  Qty: 1 each, Refills: 0      EScitalopram oxalate (LEXAPRO) 20 MG tablet Take 20 mg by mouth once daily.      lancets (LANCETS,ULTRA THIN) Misc 1 each by Misc.(Non-Drug; Combo Route) route every 6 (six) hours.  Qty: 120 each, Refills: 3      !! NIFEdipine (PROCARDIA-XL) 90 MG (OSM) 24 hr tablet Take 1 tablet (90 mg total) by mouth once daily.  Qty: 30 tablet, Refills: 3    Comments: .      prenatal 25/iron fum/folic/dha (PRENATAL-1 ORAL) Take by mouth.       !! - Potential duplicate medications found. Please discuss with provider.          Discharge Procedure Orders   Diet Adult Regular     Pelvic Rest     Notify your health care provider if you experience any of the following:  temperature >100.4     Notify your health care provider if you experience any of the following:  severe uncontrolled pain     Notify your health care provider if you experience any of the following:  severe persistent headache     Notify your health care provider if you experience any of the following:  persistent dizziness, light-headedness, or visual disturbances     Activity as tolerated        Follow-up Information     Orquidea Amaro MD Follow up in 1 week(s).    Specialty: Obstetrics and Gynecology  Why: 1 week BP check, 6 weeks PP check  Contact information:  [address]  [address] [address] [phone]

## 2022-06-08 NOTE — PLAN OF CARE
"Visited patient in room, mother holding baby in arms, rocking.  Mother states she is now able to pump 30-40ml c each session, states she continues to use small volumes of formula at each until she is able to pump large enough volumes to provide exclusive breast milk feeds. States baby is not nursing effectively at the breast.  Discussed normal breastfeeding behaviors for  babies.  Rented a Symphony pump for use at home. POC discussed and developed:  mother will feed baby on cue "8 or more in 24" but not longer than every 3hrs between feeds;  will place baby to the breast at least once day to practice breastfeeding; will Paced bottle feed baby EBM/formula ad herbert; will monitor baby's 24 hr diaper counts; will double pump breasts c the Symphony pump "8 or more in 24" using the Maintain pumping pattern c the most suction that is comfortable; will care for double collection kit as instructed; will refer to Breastfeeding guide as needed; will call [facility] prn.  "

## 2022-06-08 NOTE — TELEPHONE ENCOUNTER
Spoke with mother baby nurse, calling to schedule BP check next week.  Scheduled BP/mood check with Vanda on Wednesday.      She just wanted to let you know the pt stayed a couple extra days for elevated BP

## 2022-06-08 NOTE — PROGRESS NOTES
POSTPARTUM PROGRESS NOTE    Subjective:     PPD/POD#: 5   Procedure: Primary LTCS   EGA: 35w1d   N/V: No   F/C: No   Abd Pain: Mild, well-controlled with oral pain medication   Lochia: Mild   Voiding: Yes   Ambulating: Yes   Bowel fnc: Yes   Breastfeeding: Yes   Contraception: Per primary OB   Circumcision: Declined by OB attending     Objective:      Temp:  [97.9 °F (36.6 °C)-98.2 °F (36.8 °C)] 98.2 °F (36.8 °C)  Pulse:  [] 75  Resp:  [15-18] 18  SpO2:  [95 %-98 %] 98 %  BP: (112-154)/(75-98) 154/98    Lung: Normal respiratory effort   Abdomen: Soft, appropriately tender   Uterus: Firm, no fundal tenderness   Incision: Bandage in place without shadowing   : Deferred   Extremities: Bilateral 2+ LE edema, nontender     Lab Review    Recent Labs   Lab 06/02/22  1219      K 4.3      CO2 19*   BUN 11   CREATININE 0.6   *   PROT 6.0   BILITOT 0.3   ALKPHOS 181*   ALT 17   AST 19       Recent Labs   Lab 06/02/22  1219 06/04/22  0441   WBC 7.95 12.27   HGB 11.4* 9.7*   HCT 33.3* 28.5*   MCV 93 94    188         I/O  No intake or output data in the 24 hours ending 06/08/22 1108     Assessment and Plan:   Postpartum care:  - Patient doing well.  - Continue routine management and advances.  - Will discharge home today.    cHTN w/JEREL w/SF  - BP as above  - asymptomatic  - preE labs as above  - UOP: adequate  - Mag: completed  - Hypertensive agent Procardia XL 90mg po qd and 30mg po qhs added yes, increased labetalol to 300mg po bid, lasix 20mg po qd..     Mood Disorder  - Mood stable  - Medications: Escitalopram  - Will need 1-2 week postpartum mood check     Anemia acute blood loss  - H/H as above  - QBL: 775 mL  - asymptomatic  - iron/colace      Kailey Caballero

## 2022-06-08 NOTE — PHYSICIAN QUERY
PT Name: Cherrie Wells  MR #: [id number]     DOCUMENTATION CLARIFICATION     CDS/: ALFREDITO Zepeda,RNC-MNN        Contact information:[email or web address]  This form is a permanent document in the medical record.    Query Date: 2022  By submitting this query, we are merely seeking further clarification of documentation. Please utilize your independent clinical judgment when addressing the question(s) below.      Indicators Supporting Clinical Findings Location in Medical Record   X Documentation of uterine atony with bleeding, post-partum bleeding, or hemorrhage Atony was noted  L&D Delivery note 6/3    Documentation of retained placenta     X Delivery type with EBL or QBL Procedure(s) (LRB):   SECTION     Estimated Blood Loss (EBL): * 800 mL * L&D Delivery note /3   X H/H Hgb=11.4-->9.7  Hct=33.3-->28.5 LAB /-    Vital signs     X Medications, Treatment additional IV pitocin, IV TXA given.  Additional figure of 8 sutures placed at incision for additional hemostasis.   IM hemabate was also given for continued atony. Additional suture placed at L angle as well for hemostasis.  Continued fundal massage was performed and atony resolved    Cytotec was placed rectally following the procedure L&D Delivery note 6/3    Blood transfusion      Other          Provider, please specify the diagnosis or diagnoses associated with the above clinical findings:      [   ] Immediate post-partum hemorrhage   [  x ] Postpartum uterine atony without hemorrhage   [   ] Other hematological diagnosis (please specify): _________________   [  ] Clinically undetermined        Please document in your progress notes daily for the duration of treatment, until resolved, and include in your discharge summary.  (Form 14108)

## 2022-06-08 NOTE — PLAN OF CARE
VSS. Uterus firm w/o massage, bleeding light. Incision w/Optifoam dressing clean, dry, and intact. Pt ambulating independently, voiding spontaneously, passing flatus. Pain managed adequately w/medication. Pt c/o tooth pain and expresses need to see dentist once discharged. Plan of care reviewed w/pt and significant other. No new concerns expressed at this time. Will continue to monitor and intervene as necessary.      Problem: Diabetes in Pregnancy  Goal: Blood Glucose Level Within Targeted Range  Outcome: Ongoing, Progressing     Problem:  Fall Injury Risk  Goal: Absence of Fall, Infant Drop and Related Injury  Outcome: Ongoing, Progressing     Problem: Infection  Goal: Absence of Infection Signs and Symptoms  Outcome: Ongoing, Progressing     Problem: Adult Inpatient Plan of Care  Goal: Plan of Care Review  Outcome: Ongoing, Progressing  Goal: Patient-Specific Goal (Individualized)  Outcome: Ongoing, Progressing  Goal: Absence of Hospital-Acquired Illness or Injury  Outcome: Ongoing, Progressing  Goal: Optimal Comfort and Wellbeing  Outcome: Ongoing, Progressing  Goal: Readiness for Transition of Care  Outcome: Ongoing, Progressing     Problem: Breastfeeding  Goal: Effective Breastfeeding  Outcome: Ongoing, Progressing     Problem: Skin Injury Risk Increased  Goal: Skin Health and Integrity  Outcome: Ongoing, Progressing     Problem: Adjustment to Role Transition (Postpartum  Delivery)  Goal: Successful Maternal Role Transition  Outcome: Ongoing, Progressing     Problem: Bleeding (Postpartum  Delivery)  Goal: Hemostasis  Outcome: Ongoing, Progressing     Problem: Infection (Postpartum  Delivery)  Goal: Absence of Infection Signs and Symptoms  Outcome: Ongoing, Progressing     Problem: Pain (Postpartum  Delivery)  Goal: Acceptable Pain Control  Outcome: Ongoing, Progressing

## 2022-06-09 ENCOUNTER — TELEPHONE (OUTPATIENT)
Dept: LACTATION | Facility: CLINIC | Age: 32
End: 2022-06-09
Payer: COMMERCIAL

## 2022-06-09 LAB
FINAL PATHOLOGIC DIAGNOSIS: NORMAL
Lab: NORMAL

## 2022-06-09 NOTE — TELEPHONE ENCOUNTER
LC called to check on mother and baby. Baby is a 35 weeker. Mother is pumping with every feeding and getting 55-60ml. For now she is pumping and bottlefeeding. Will call LC as needed as she worls on getting her baby to the breast.

## 2022-06-10 NOTE — ADDENDUM NOTE
Addendum  created 06/10/22 1223 by Merary Simons MD    Attestation recorded in Intraprocedure, Intraprocedure Attestations filed

## 2022-06-15 ENCOUNTER — POSTPARTUM VISIT (OUTPATIENT)
Dept: OBSTETRICS AND GYNECOLOGY | Facility: CLINIC | Age: 32
End: 2022-06-15
Payer: COMMERCIAL

## 2022-06-15 VITALS
WEIGHT: 170 LBS | BODY MASS INDEX: 31.28 KG/M2 | HEIGHT: 62 IN | SYSTOLIC BLOOD PRESSURE: 118 MMHG | DIASTOLIC BLOOD PRESSURE: 72 MMHG

## 2022-06-15 DIAGNOSIS — Z01.30 BLOOD PRESSURE CHECK: ICD-10-CM

## 2022-06-15 DIAGNOSIS — Z48.89 ENCOUNTER FOR POST SURGICAL WOUND CHECK: ICD-10-CM

## 2022-06-15 PROCEDURE — 99999 PR PBB SHADOW E&M-EST. PATIENT-LVL III: ICD-10-PCS | Mod: PBBFAC,,, | Performed by: NURSE PRACTITIONER

## 2022-06-15 PROCEDURE — 99999 PR PBB SHADOW E&M-EST. PATIENT-LVL III: CPT | Mod: PBBFAC,,, | Performed by: NURSE PRACTITIONER

## 2022-06-15 PROCEDURE — 0502F PR SUBSEQUENT PRENATAL CARE: ICD-10-PCS | Mod: CPTII,S$GLB,, | Performed by: NURSE PRACTITIONER

## 2022-06-15 PROCEDURE — 0502F SUBSEQUENT PRENATAL CARE: CPT | Mod: CPTII,S$GLB,, | Performed by: NURSE PRACTITIONER

## 2022-06-15 NOTE — PROGRESS NOTES
Postpartum Visit  Cherrie Wells is a 32 y.o. female  is here for a postpartum visit. She is 2 weeks postpartum following a low cervical transverse  section, of a male infant with Anesthesia: spinal. . The delivery was at 35w 1d.     Pregnancy was complicated by: cHTN, preE, gDM. Denies preE symptoms, compliant with BP medications.     OB History    Para Term  AB Living   1 1   1   1   SAB IAB Ectopic Multiple Live Births         0 1      # Outcome Date GA Lbr Live/2nd Weight Sex Delivery Anes PTL Lv   1  22 35w1d  2.49 kg (5 lb 7.8 oz) M CS-LTranv EPI Y ROOPA      Complications: Fetal Intolerance, Failure to Progress in First Stage      Obstetric Comments   Menarche age        ROS:  GENERAL: No fever, chills, fatigability.  VULVAR: No pain, no lesions and no itching.  VAGINAL: No relaxation, no itching, no discharge, no abnormal bleeding and no lesions.  ABDOMEN: No abdominal pain. Denies nausea. Denies vomiting. No diarrhea. No constipation  BREAST: Denies pain. No lumps. No discharge.  URINARY: No incontinence, no nocturia, no frequency and no dysuria.  CARDIOVASCULAR: No chest pain. No shortness of breath. No leg cramps.  NEUROLOGICAL: No headaches. No vision changes.      General appearance - alert, well appearing, and in no distress  Mental status - normal mood, behavior, speech, dress, motor activity, and thought processes  Skin - coloration normal for race, good turgor, warm to touch, no rashes  Abdomen - soft, nontender, nondistended, no masses or organomegaly  7 cm Pfannenstiel incision: Clean, dry, intact - healing well.      Cherrie was seen today for postpartum care.    Diagnoses and all orders for this visit:    Encounter for postpartum care of lactating mother    Encounter for post surgical wound check    Blood pressure check      /72, patient is asymptomatic. Instructed to continue BP medications. LTCS site is healing without issue.    FU in 5  weeks for PP visit.      ARAM Bhandari-C

## 2022-06-23 ENCOUNTER — POSTPARTUM VISIT (OUTPATIENT)
Dept: OBSTETRICS AND GYNECOLOGY | Facility: CLINIC | Age: 32
End: 2022-06-23
Attending: STUDENT IN AN ORGANIZED HEALTH CARE EDUCATION/TRAINING PROGRAM
Payer: COMMERCIAL

## 2022-06-23 VITALS
HEIGHT: 62 IN | SYSTOLIC BLOOD PRESSURE: 130 MMHG | WEIGHT: 166.56 LBS | BODY MASS INDEX: 30.65 KG/M2 | DIASTOLIC BLOOD PRESSURE: 100 MMHG

## 2022-06-23 PROCEDURE — 99024 PR POST-OP FOLLOW-UP VISIT: ICD-10-PCS | Mod: S$GLB,,, | Performed by: STUDENT IN AN ORGANIZED HEALTH CARE EDUCATION/TRAINING PROGRAM

## 2022-06-23 PROCEDURE — 99999 PR PBB SHADOW E&M-EST. PATIENT-LVL III: ICD-10-PCS | Mod: PBBFAC,,, | Performed by: STUDENT IN AN ORGANIZED HEALTH CARE EDUCATION/TRAINING PROGRAM

## 2022-06-23 PROCEDURE — 99024 POSTOP FOLLOW-UP VISIT: CPT | Mod: S$GLB,,, | Performed by: STUDENT IN AN ORGANIZED HEALTH CARE EDUCATION/TRAINING PROGRAM

## 2022-06-23 PROCEDURE — 99999 PR PBB SHADOW E&M-EST. PATIENT-LVL III: CPT | Mod: PBBFAC,,, | Performed by: STUDENT IN AN ORGANIZED HEALTH CARE EDUCATION/TRAINING PROGRAM

## 2022-06-23 NOTE — PROGRESS NOTES
Subjective:      Cherrie Wells is a 32 y.o.  who presents for a postpartum visit.  She is status post   delivery secondary to failed IOL 3 weeks ago.  She has pre E with severe features.  On Labetalol 300 mg TID and procardia 30 XL. Is not taking her BP at home.  No HA, vision changes, CP, SOB, palpitations.  She is breastfeeding.  She desires no method for contraception.  She reports mood is stable.      Past Medical History:   Diagnosis Date    Hypertension     Substance abuse        Current Outpatient Medications:     aspirin 81 MG Chew, Take 81 mg by mouth once daily., Disp: , Rfl:     blood sugar diagnostic Strp, 1 each by Misc.(Non-Drug; Combo Route) route 4 (four) times daily., Disp: 120 strip, Rfl: 3    blood-glucose meter kit, Use as instructed, Disp: 1 each, Rfl: 0    EScitalopram oxalate (LEXAPRO) 20 MG tablet, Take 20 mg by mouth once daily., Disp: , Rfl:     furosemide (LASIX) 20 MG tablet, Take 1 tablet (20 mg total) by mouth once daily., Disp: 30 tablet, Rfl: 11    ibuprofen (ADVIL,MOTRIN) 800 MG tablet, Take 1 tablet (800 mg total) by mouth 3 (three) times daily., Disp: 30 tablet, Rfl: 1    labetaloL (NORMODYNE) 300 MG tablet, Take 1 tablet (300 mg total) by mouth 3 (three) times daily., Disp: 90 tablet, Rfl: 11    lancets (LANCETS,ULTRA THIN) Misc, 1 each by Misc.(Non-Drug; Combo Route) route every 6 (six) hours., Disp: 120 each, Rfl: 3    NIFEdipine (PROCARDIA-XL) 30 MG (OSM) 24 hr tablet, Take 1 tablet (30 mg total) by mouth every evening., Disp: 30 tablet, Rfl: 11    NIFEdipine (PROCARDIA-XL) 90 MG (OSM) 24 hr tablet, Take 1 tablet (90 mg total) by mouth once daily., Disp: 30 tablet, Rfl: 3    oxyCODONE-acetaminophen (PERCOCET) 5-325 mg per tablet, Take 1 tablet by mouth every 4 (four) hours as needed for Pain., Disp: 30 tablet, Rfl: 0    prenatal 25/iron fum/folic/dha (PRENATAL-1 ORAL), Take by mouth., Disp: , Rfl:       Objective:     Vitals:    22  0918   BP: (!) 130/100       APPEARANCE: Well nourished, well developed, in no acute distress.  PSYCH: Appropriate mood and affect.  NECK:  Supple, no thyromegaly.  ABDOMEN:  Soft, nontender.  Pfannenstiel incision C/D/I, healing well.    GENITOURINARY:  Deferred.  EXTREMITIES: No edema.      Assessment:     Postpartum care and examination    Postop check        Plan:     1. Postpartum course reviewed and discussed with patient.  Will send me blood pressure monitoring over the next few days.  Will continue current medications.  All questions answered.  Return to clinic in 4 weeks for postpartum visit.

## 2022-07-15 ENCOUNTER — PATIENT MESSAGE (OUTPATIENT)
Dept: OBSTETRICS AND GYNECOLOGY | Facility: CLINIC | Age: 32
End: 2022-07-15
Payer: COMMERCIAL

## 2022-07-15 DIAGNOSIS — M54.9 BACK PAIN, UNSPECIFIED BACK LOCATION, UNSPECIFIED BACK PAIN LATERALITY, UNSPECIFIED CHRONICITY: Primary | ICD-10-CM

## 2022-07-15 NOTE — TELEPHONE ENCOUNTER
Pt reports severe back pain that has worsened significantly the past 3-4 days.  Would like to get MRI done.  Does not have a current PCP.  Ok to put referral in for Orthopedics?

## 2022-07-18 ENCOUNTER — TELEPHONE (OUTPATIENT)
Dept: ORTHOPEDICS | Facility: CLINIC | Age: 32
End: 2022-07-18
Payer: COMMERCIAL

## 2022-07-18 ENCOUNTER — PATIENT MESSAGE (OUTPATIENT)
Dept: ORTHOPEDICS | Facility: CLINIC | Age: 32
End: 2022-07-18
Payer: COMMERCIAL

## 2022-07-20 ENCOUNTER — TELEPHONE (OUTPATIENT)
Dept: ORTHOPEDICS | Facility: CLINIC | Age: 32
End: 2022-07-20
Payer: COMMERCIAL

## 2022-07-20 ENCOUNTER — PATIENT MESSAGE (OUTPATIENT)
Dept: ORTHOPEDICS | Facility: CLINIC | Age: 32
End: 2022-07-20
Payer: COMMERCIAL

## 2022-07-21 ENCOUNTER — PATIENT MESSAGE (OUTPATIENT)
Dept: ORTHOPEDICS | Facility: CLINIC | Age: 32
End: 2022-07-21
Payer: COMMERCIAL

## 2022-07-23 ENCOUNTER — PATIENT MESSAGE (OUTPATIENT)
Dept: LACTATION | Facility: CLINIC | Age: 32
End: 2022-07-23
Payer: COMMERCIAL

## 2022-07-29 ENCOUNTER — TELEPHONE (OUTPATIENT)
Dept: ORTHOPEDICS | Facility: CLINIC | Age: 32
End: 2022-07-29
Payer: COMMERCIAL

## 2022-08-04 NOTE — ADDENDUM NOTE
Addendum  created 08/04/22 1639 by Crystal Pate MD    Attestation recorded in Intraprocedure, Intraprocedure Attestations filed

## 2022-08-08 ENCOUNTER — PATIENT MESSAGE (OUTPATIENT)
Dept: LACTATION | Facility: CLINIC | Age: 32
End: 2022-08-08
Payer: COMMERCIAL

## 2022-08-08 ENCOUNTER — PATIENT MESSAGE (OUTPATIENT)
Dept: OBSTETRICS AND GYNECOLOGY | Facility: CLINIC | Age: 32
End: 2022-08-08
Payer: COMMERCIAL

## 2022-08-19 ENCOUNTER — POSTPARTUM VISIT (OUTPATIENT)
Dept: OBSTETRICS AND GYNECOLOGY | Facility: CLINIC | Age: 32
End: 2022-08-19
Attending: STUDENT IN AN ORGANIZED HEALTH CARE EDUCATION/TRAINING PROGRAM
Payer: COMMERCIAL

## 2022-08-19 VITALS
HEIGHT: 62 IN | WEIGHT: 177.81 LBS | DIASTOLIC BLOOD PRESSURE: 80 MMHG | BODY MASS INDEX: 32.72 KG/M2 | SYSTOLIC BLOOD PRESSURE: 126 MMHG

## 2022-08-19 PROCEDURE — 99999 PR PBB SHADOW E&M-EST. PATIENT-LVL III: ICD-10-PCS | Mod: PBBFAC,,, | Performed by: STUDENT IN AN ORGANIZED HEALTH CARE EDUCATION/TRAINING PROGRAM

## 2022-08-19 PROCEDURE — 99999 PR PBB SHADOW E&M-EST. PATIENT-LVL III: CPT | Mod: PBBFAC,,, | Performed by: STUDENT IN AN ORGANIZED HEALTH CARE EDUCATION/TRAINING PROGRAM

## 2022-08-19 PROCEDURE — 0503F POSTPARTUM CARE VISIT: CPT | Mod: CPTII,S$GLB,, | Performed by: STUDENT IN AN ORGANIZED HEALTH CARE EDUCATION/TRAINING PROGRAM

## 2022-08-19 PROCEDURE — 0503F PR POSTPARTUM CARE VISIT: ICD-10-PCS | Mod: CPTII,S$GLB,, | Performed by: STUDENT IN AN ORGANIZED HEALTH CARE EDUCATION/TRAINING PROGRAM

## 2022-08-19 RX ORDER — ETONOGESTREL AND ETHINYL ESTRADIOL VAGINAL RING .015; .12 MG/D; MG/D
1 RING VAGINAL
Qty: 1 EACH | Refills: 11 | Status: SHIPPED | OUTPATIENT
Start: 2022-08-19 | End: 2023-08-19

## 2022-08-24 NOTE — PROGRESS NOTES
Subjective:      Cherrie Wells is a 32 y.o.  who presents for a postpartum visit.  She is status post   delivery secondary to arrest of dilation 6 weeks ago.  Her hospitalization was complicated by Pre E with severe features.  No longer on antihypertensives.  She is not breastfeeding.  She desires no method, NuvaRing vaginal inserts for contraception.  She reports mood is stable.        Past Medical History:   Diagnosis Date    Hypertension     Substance abuse        Current Outpatient Medications:     aspirin 81 MG Chew, Take 81 mg by mouth once daily., Disp: , Rfl:     blood sugar diagnostic Strp, 1 each by Misc.(Non-Drug; Combo Route) route 4 (four) times daily., Disp: 120 strip, Rfl: 3    blood-glucose meter kit, Use as instructed, Disp: 1 each, Rfl: 0    EScitalopram oxalate (LEXAPRO) 20 MG tablet, Take 20 mg by mouth once daily., Disp: , Rfl:     furosemide (LASIX) 20 MG tablet, Take 1 tablet (20 mg total) by mouth once daily., Disp: 30 tablet, Rfl: 11    ibuprofen (ADVIL,MOTRIN) 800 MG tablet, Take 1 tablet (800 mg total) by mouth 3 (three) times daily., Disp: 30 tablet, Rfl: 1    labetaloL (NORMODYNE) 300 MG tablet, Take 1 tablet (300 mg total) by mouth 3 (three) times daily., Disp: 90 tablet, Rfl: 11    lancets (LANCETS,ULTRA THIN) Misc, 1 each by Misc.(Non-Drug; Combo Route) route every 6 (six) hours., Disp: 120 each, Rfl: 3    NIFEdipine (PROCARDIA-XL) 90 MG (OSM) 24 hr tablet, Take 1 tablet (90 mg total) by mouth once daily., Disp: 30 tablet, Rfl: 3    oxyCODONE-acetaminophen (PERCOCET) 5-325 mg per tablet, Take 1 tablet by mouth every 4 (four) hours as needed for Pain., Disp: 30 tablet, Rfl: 0    prenatal 25/iron fum/folic/dha (PRENATAL-1 ORAL), Take by mouth., Disp: , Rfl:     etonogestreL-ethinyl estradioL (NUVARING) 0.12-0.015 mg/24 hr vaginal ring, Place 1 each vaginally every 21 days. Insert one (1) ring vaginally and leave in place for three (3) weeks, then  remove for one (1) week., Disp: 1 each, Rfl: 11    NIFEdipine (PROCARDIA-XL) 30 MG (OSM) 24 hr tablet, Take 1 tablet (30 mg total) by mouth every evening. (Patient not taking: Reported on 8/19/2022), Disp: 30 tablet, Rfl: 11      Objective:     Vitals:    08/19/22 0957   BP: 126/80       APPEARANCE: Well nourished, well developed, in no acute distress.  PSYCH: Appropriate mood and affect.  NECK:  Supple, no thyromegaly.  ABDOMEN:  Soft, nontender.  Pfannenstiel incision C/D/I.  GENITOURINARY:  External genitalia normal in appearance, normal perineal body, normal urethral meatus.  Vagina with scant discharge, no blood.  No lesions.  Cervix without lesion, C/T/H.  Uterus mobile, nontender, normal in size.  Adnexa without masses or TTP.    EXTREMITIES: No edema.      Assessment:     Postpartum care and examination    Other orders  -     etonogestreL-ethinyl estradioL (NUVARING) 0.12-0.015 mg/24 hr vaginal ring; Place 1 each vaginally every 21 days. Insert one (1) ring vaginally and leave in place for three (3) weeks, then remove for one (1) week.  Dispense: 1 each; Refill: 11        Plan:     1. Postpartum course reviewed and discussed with patient.  All questions answered.  Desires nuva ring.  R/B/A reviewed and all questions answered.  UPT prior to meds. Return to clinic in 1 year for annual exam.

## 2025-07-24 ENCOUNTER — OFFICE VISIT (OUTPATIENT)
Dept: URGENT CARE | Facility: CLINIC | Age: 35
End: 2025-07-24

## 2025-07-24 VITALS
WEIGHT: 145 LBS | HEART RATE: 90 BPM | DIASTOLIC BLOOD PRESSURE: 125 MMHG | HEIGHT: 63 IN | BODY MASS INDEX: 25.69 KG/M2 | SYSTOLIC BLOOD PRESSURE: 163 MMHG | OXYGEN SATURATION: 99 % | TEMPERATURE: 98 F | RESPIRATION RATE: 20 BRPM

## 2025-07-24 DIAGNOSIS — J01.90 ACUTE BACTERIAL SINUSITIS: ICD-10-CM

## 2025-07-24 DIAGNOSIS — H10.9 BACTERIAL CONJUNCTIVITIS OF LEFT EYE: Primary | ICD-10-CM

## 2025-07-24 DIAGNOSIS — B96.89 ACUTE BACTERIAL SINUSITIS: ICD-10-CM

## 2025-07-24 DIAGNOSIS — R09.81 NASAL CONGESTION: ICD-10-CM

## 2025-07-24 RX ORDER — CETIRIZINE HYDROCHLORIDE 10 MG/1
10 TABLET ORAL DAILY
Qty: 30 TABLET | Refills: 0 | Status: SHIPPED | OUTPATIENT
Start: 2025-07-24

## 2025-07-24 RX ORDER — DEXTROAMPHETAMINE SACCHARATE, AMPHETAMINE ASPARTATE, DEXTROAMPHETAMINE SULFATE AND AMPHETAMINE SULFATE 5; 5; 5; 5 MG/1; MG/1; MG/1; MG/1
1 TABLET ORAL 2 TIMES DAILY
COMMUNITY
Start: 2025-07-10

## 2025-07-24 RX ORDER — LOSARTAN POTASSIUM 25 MG/1
25 TABLET ORAL 2 TIMES DAILY
COMMUNITY
Start: 2025-06-21

## 2025-07-24 RX ORDER — FLUTICASONE PROPIONATE 50 MCG
1 SPRAY, SUSPENSION (ML) NASAL DAILY
Qty: 11.1 ML | Refills: 0 | Status: SHIPPED | OUTPATIENT
Start: 2025-07-24

## 2025-07-24 RX ORDER — NEOMYCIN SULFATE, POLYMYXIN B SULFATE AND DEXAMETHASONE 3.5; 10000; 1 MG/ML; [USP'U]/ML; MG/ML
1 SUSPENSION/ DROPS OPHTHALMIC 4 TIMES DAILY
Qty: 5 ML | Refills: 0 | Status: SHIPPED | OUTPATIENT
Start: 2025-07-24 | End: 2025-07-31

## 2025-07-24 RX ORDER — AMOXICILLIN AND CLAVULANATE POTASSIUM 875; 125 MG/1; MG/1
1 TABLET, FILM COATED ORAL EVERY 12 HOURS
Qty: 14 TABLET | Refills: 0 | Status: SHIPPED | OUTPATIENT
Start: 2025-07-24 | End: 2025-07-31

## 2025-07-24 NOTE — PROGRESS NOTES
"Subjective:      Patient ID: Cherrie Wells is a 35 y.o. female.    Vitals:  height is 5' 3" (1.6 m) and weight is 65.8 kg (145 lb). Her oral temperature is 98.1 °F (36.7 °C). Her blood pressure is 163/125 (abnormal) and her pulse is 90. Her respiration is 20 and oxygen saturation is 99%.     Chief Complaint: Sinus Problem    35-year-old female presenting today with a runny nose, congestion, sore throat, cough, ear congestion, x5 days.  She has been taking Mucinex with no improvement of her symptoms.  She also reports of left eye greenish discharge, redness.  She has been taking over-the-counter eye drops with no improvement.  She denies any eye trauma or injury.  No loss or changes in vision. PERRLA.  Patient is hypertensive today.  She states she has not taken her blood pressure medicine today.        Sinus Problem  This is a new problem. The current episode started in the past 7 days. The problem has been gradually worsening since onset. There has been no fever. Associated symptoms include congestion, ear pain, a hoarse voice, sinus pressure and a sore throat. Pertinent negatives include no chills, coughing, diaphoresis, headaches, neck pain, shortness of breath, sneezing or swollen glands. Treatments tried: OTC Mucinex fast max. The treatment provided mild relief.       Constitution: Negative for activity change, appetite change, chills, sweating and fatigue.   HENT:  Positive for ear pain, congestion, sinus pressure and sore throat. Negative for ear discharge, foreign body in ear, facial trauma, nosebleeds, foreign body in nose, postnasal drip, sinus pain, trouble swallowing and voice change.    Neck: Negative for neck pain, neck stiffness, painful lymph nodes and neck swelling.   Cardiovascular:  Negative for chest trauma and chest pain.   Eyes:  Negative for eye trauma, foreign body in eye and eye discharge.   Respiratory:  Negative for sleep apnea, chest tightness, cough, sputum production, bloody " sputum, COPD, shortness of breath and asthma.    Gastrointestinal:  Negative for abdominal trauma and abdominal pain.   Endocrine: hair loss.   Genitourinary:  Negative for dysuria and frequency.   Musculoskeletal:  Negative for pain and trauma.   Skin:  Negative for color change and pale.   Allergic/Immunologic: Positive for environmental allergies and seasonal allergies. Negative for food allergies, eczema, asthma, immunocompromised state and sneezing.   Neurological:  Negative for dizziness, history of vertigo, light-headedness, passing out, headaches, disorientation and altered mental status.   Hematologic/Lymphatic: Negative for swollen lymph nodes, easy bruising/bleeding and trouble clotting. Does not bruise/bleed easily.   Psychiatric/Behavioral:  Negative for altered mental status, disorientation, confusion and agitation.       Objective:     Physical Exam   Constitutional: She is oriented to person, place, and time. She appears well-developed. She is cooperative.  Non-toxic appearance. She does not appear ill. No distress.   HENT:   Head: Normocephalic and atraumatic.   Ears:   Right Ear: Hearing, external ear and ear canal normal. A middle ear effusion is present.   Left Ear: Hearing, external ear and ear canal normal. A middle ear effusion is present.   Nose: Congestion present. No mucosal edema, rhinorrhea or nasal deformity. No epistaxis. Right sinus exhibits no maxillary sinus tenderness and no frontal sinus tenderness. Left sinus exhibits no maxillary sinus tenderness and no frontal sinus tenderness.   Mouth/Throat: Uvula is midline and oropharynx is clear and moist. Mucous membranes are dry. No trismus in the jaw. Normal dentition. No uvula swelling. No oropharyngeal exudate, posterior oropharyngeal edema or posterior oropharyngeal erythema. Oropharynx is clear.   Eyes: Conjunctivae and lids are normal. Left eye exhibits discharge. No scleral icterus.   Neck: Trachea normal and phonation normal. Neck  supple. No edema present. No erythema present. No neck rigidity present.   Cardiovascular: Normal rate, regular rhythm, normal heart sounds and normal pulses.   Pulmonary/Chest: Effort normal and breath sounds normal. No stridor. No respiratory distress. She has no decreased breath sounds. She has no wheezes. She has no rhonchi. She has no rales. She exhibits no tenderness.   Abdominal: Normal appearance and bowel sounds are normal. Soft.   Musculoskeletal: Normal range of motion.         General: No deformity. Normal range of motion.   Neurological: She is alert and oriented to person, place, and time. She exhibits normal muscle tone. Coordination normal.   Skin: Skin is warm, dry, intact, not diaphoretic and not pale.   Psychiatric: Her speech is normal and behavior is normal. Judgment and thought content normal.   Nursing note and vitals reviewed.      Assessment:     1. Bacterial conjunctivitis of left eye    2. Acute bacterial sinusitis    3. Nasal congestion        Plan:       Bacterial conjunctivitis of left eye  -     neomycin-polymyxin-dexamethasone (MAXITROL) 3.5mg/mL-10,000 unit/mL-0.1 % DrpS; Place 1 drop into the left eye 4 (four) times daily. for 7 days  Dispense: 5 mL; Refill: 0    Acute bacterial sinusitis  -     amoxicillin-clavulanate 875-125mg (AUGMENTIN) 875-125 mg per tablet; Take 1 tablet by mouth every 12 (twelve) hours. for 7 days  Dispense: 14 tablet; Refill: 0  -     fluticasone propionate (FLONASE) 50 mcg/actuation nasal spray; 1 spray (50 mcg total) by Each Nostril route once daily.  Dispense: 11.1 mL; Refill: 0  -     cetirizine (ZYRTEC) 10 MG tablet; Take 1 tablet (10 mg total) by mouth once daily.  Dispense: 30 tablet; Refill: 0    Nasal congestion  -     fluticasone propionate (FLONASE) 50 mcg/actuation nasal spray; 1 spray (50 mcg total) by Each Nostril route once daily.  Dispense: 11.1 mL; Refill: 0             Additional MDM:     Heart Failure Score:   COPD = No

## 2025-07-24 NOTE — PATIENT INSTRUCTIONS
Take medications as prescribed.  Take your blood pressure medicine as prescribed.    What can be done to prevent this health problem?   Good hygiene can help prevent the spread of this infection.  Wash your hands well and often, after touching your face, and after you cough or sneeze.  Do not share eye make-up or eye drops with others.  Do not share pillows or towels with others.  Clean contact lenses daily. Do not sleep in them unless your eye doctor says it is OK.  When do I need to call the doctor?   You have trouble seeing clearly after blinking.  Your eye is still red or has drainage after 3 days.  You have eye pain that is getting worse.  - Rest.    - Drink plenty of fluids.    - Acetaminophen (tylenol) or Ibuprofen (advil,motrin) as directed as needed for fever/pain. Avoid tylenol if you have a history of liver disease. Do not take ibuprofen if you have a history of GI bleeding, kidney disease, or if you take blood thinners.     - Follow up with your PCP or specialty clinic as directed in the next 1-2 weeks if not improved or as needed.  You can call [phone] to schedule an appointment with the appropriate provider.    - Go to the ER or seek medical attention immediately if you develop new or worsening symptoms.     - You must understand that you have received an Urgent Care treatment only and that you may be released before all of your medical problems are known or treated.   - You, the patient, will arrange for follow up care as instructed.   - If your condition worsens or fails to improve we recommend that you receive another evaluation at the ER immediately or contact your PCP to discuss your concerns or return here.